# Patient Record
Sex: MALE | Race: WHITE | NOT HISPANIC OR LATINO | URBAN - METROPOLITAN AREA
[De-identification: names, ages, dates, MRNs, and addresses within clinical notes are randomized per-mention and may not be internally consistent; named-entity substitution may affect disease eponyms.]

---

## 2018-08-16 ENCOUNTER — INPATIENT (INPATIENT)
Facility: HOSPITAL | Age: 53
LOS: 3 days | Discharge: HOME | End: 2018-08-20
Attending: HOSPITALIST | Admitting: HOSPITALIST
Payer: MEDICAID

## 2018-08-16 VITALS
WEIGHT: 190.04 LBS | RESPIRATION RATE: 18 BRPM | DIASTOLIC BLOOD PRESSURE: 97 MMHG | TEMPERATURE: 98 F | SYSTOLIC BLOOD PRESSURE: 122 MMHG | OXYGEN SATURATION: 98 % | HEART RATE: 73 BPM | HEIGHT: 68 IN

## 2018-08-16 LAB
ALBUMIN SERPL ELPH-MCNC: 4.2 G/DL — SIGNIFICANT CHANGE UP (ref 3.3–5)
ALP SERPL-CCNC: 55 U/L — SIGNIFICANT CHANGE UP (ref 40–120)
ALT FLD-CCNC: 39 U/L — SIGNIFICANT CHANGE UP (ref 12–78)
ANION GAP SERPL CALC-SCNC: 11 MMOL/L — SIGNIFICANT CHANGE UP (ref 5–17)
APTT BLD: 27 SEC — LOW (ref 27.5–37.4)
AST SERPL-CCNC: 22 U/L — SIGNIFICANT CHANGE UP (ref 15–37)
BASOPHILS # BLD AUTO: 0.08 K/UL — SIGNIFICANT CHANGE UP (ref 0–0.2)
BASOPHILS NFR BLD AUTO: 0.7 % — SIGNIFICANT CHANGE UP (ref 0–2)
BILIRUB SERPL-MCNC: 0.3 MG/DL — SIGNIFICANT CHANGE UP (ref 0.2–1.2)
BUN SERPL-MCNC: 22 MG/DL — SIGNIFICANT CHANGE UP (ref 7–23)
CALCIUM SERPL-MCNC: 9.1 MG/DL — SIGNIFICANT CHANGE UP (ref 8.5–10.1)
CHLORIDE SERPL-SCNC: 103 MMOL/L — SIGNIFICANT CHANGE UP (ref 96–108)
CO2 SERPL-SCNC: 25 MMOL/L — SIGNIFICANT CHANGE UP (ref 22–31)
CREAT SERPL-MCNC: 1.16 MG/DL — SIGNIFICANT CHANGE UP (ref 0.5–1.3)
EOSINOPHIL # BLD AUTO: 0.07 K/UL — SIGNIFICANT CHANGE UP (ref 0–0.5)
EOSINOPHIL NFR BLD AUTO: 0.6 % — SIGNIFICANT CHANGE UP (ref 0–6)
GLUCOSE SERPL-MCNC: 100 MG/DL — HIGH (ref 70–99)
HCT VFR BLD CALC: 38.7 % — LOW (ref 39–50)
HCT VFR BLD CALC: 45.8 % — SIGNIFICANT CHANGE UP (ref 39–50)
HGB BLD-MCNC: 13.2 G/DL — SIGNIFICANT CHANGE UP (ref 13–17)
HGB BLD-MCNC: 15.7 G/DL — SIGNIFICANT CHANGE UP (ref 13–17)
IMM GRANULOCYTES NFR BLD AUTO: 0.6 % — SIGNIFICANT CHANGE UP (ref 0–1.5)
INR BLD: 1.13 RATIO — SIGNIFICANT CHANGE UP (ref 0.88–1.16)
LYMPHOCYTES # BLD AUTO: 1.99 K/UL — SIGNIFICANT CHANGE UP (ref 1–3.3)
LYMPHOCYTES # BLD AUTO: 18.4 % — SIGNIFICANT CHANGE UP (ref 13–44)
MCHC RBC-ENTMCNC: 31.5 PG — SIGNIFICANT CHANGE UP (ref 27–34)
MCHC RBC-ENTMCNC: 31.9 PG — SIGNIFICANT CHANGE UP (ref 27–34)
MCHC RBC-ENTMCNC: 34.1 GM/DL — SIGNIFICANT CHANGE UP (ref 32–36)
MCHC RBC-ENTMCNC: 34.3 GM/DL — SIGNIFICANT CHANGE UP (ref 32–36)
MCV RBC AUTO: 92 FL — SIGNIFICANT CHANGE UP (ref 80–100)
MCV RBC AUTO: 93.5 FL — SIGNIFICANT CHANGE UP (ref 80–100)
MONOCYTES # BLD AUTO: 0.78 K/UL — SIGNIFICANT CHANGE UP (ref 0–0.9)
MONOCYTES NFR BLD AUTO: 7.2 % — SIGNIFICANT CHANGE UP (ref 2–14)
NEUTROPHILS # BLD AUTO: 7.81 K/UL — HIGH (ref 1.8–7.4)
NEUTROPHILS NFR BLD AUTO: 72.5 % — SIGNIFICANT CHANGE UP (ref 43–77)
NRBC # BLD: 0 /100 WBCS — SIGNIFICANT CHANGE UP (ref 0–0)
NRBC # BLD: 0 /100 WBCS — SIGNIFICANT CHANGE UP (ref 0–0)
PLATELET # BLD AUTO: 253 K/UL — SIGNIFICANT CHANGE UP (ref 150–400)
PLATELET # BLD AUTO: 267 K/UL — SIGNIFICANT CHANGE UP (ref 150–400)
POTASSIUM SERPL-MCNC: 4.2 MMOL/L — SIGNIFICANT CHANGE UP (ref 3.5–5.3)
POTASSIUM SERPL-SCNC: 4.2 MMOL/L — SIGNIFICANT CHANGE UP (ref 3.5–5.3)
PROT SERPL-MCNC: 8.4 GM/DL — HIGH (ref 6–8.3)
PROTHROM AB SERPL-ACNC: 12.2 SEC — SIGNIFICANT CHANGE UP (ref 9.8–12.7)
RBC # BLD: 4.14 M/UL — LOW (ref 4.2–5.8)
RBC # BLD: 4.98 M/UL — SIGNIFICANT CHANGE UP (ref 4.2–5.8)
RBC # FLD: 13 % — SIGNIFICANT CHANGE UP (ref 10.3–14.5)
RBC # FLD: 13 % — SIGNIFICANT CHANGE UP (ref 10.3–14.5)
SODIUM SERPL-SCNC: 139 MMOL/L — SIGNIFICANT CHANGE UP (ref 135–145)
TROPONIN I SERPL-MCNC: 0.05 NG/ML — HIGH (ref 0.01–0.04)
TROPONIN I SERPL-MCNC: 0.35 NG/ML — HIGH (ref 0.01–0.04)
TROPONIN I SERPL-MCNC: 1.4 NG/ML — HIGH (ref 0.01–0.04)
WBC # BLD: 10.79 K/UL — HIGH (ref 3.8–10.5)
WBC # BLD: 9.65 K/UL — SIGNIFICANT CHANGE UP (ref 3.8–10.5)
WBC # FLD AUTO: 10.79 K/UL — HIGH (ref 3.8–10.5)
WBC # FLD AUTO: 9.65 K/UL — SIGNIFICANT CHANGE UP (ref 3.8–10.5)

## 2018-08-16 PROCEDURE — 93010 ELECTROCARDIOGRAM REPORT: CPT

## 2018-08-16 PROCEDURE — 74174 CTA ABD&PLVS W/CONTRAST: CPT | Mod: 26

## 2018-08-16 PROCEDURE — 71046 X-RAY EXAM CHEST 2 VIEWS: CPT | Mod: 26

## 2018-08-16 PROCEDURE — 93306 TTE W/DOPPLER COMPLETE: CPT | Mod: 26

## 2018-08-16 PROCEDURE — 99285 EMERGENCY DEPT VISIT HI MDM: CPT

## 2018-08-16 PROCEDURE — 71275 CT ANGIOGRAPHY CHEST: CPT | Mod: 26

## 2018-08-16 RX ORDER — METOPROLOL TARTRATE 50 MG
25 TABLET ORAL DAILY
Qty: 0 | Refills: 0 | Status: DISCONTINUED | OUTPATIENT
Start: 2018-08-16 | End: 2018-08-17

## 2018-08-16 RX ORDER — HYDROMORPHONE HYDROCHLORIDE 2 MG/ML
1 INJECTION INTRAMUSCULAR; INTRAVENOUS; SUBCUTANEOUS ONCE
Qty: 0 | Refills: 0 | Status: DISCONTINUED | OUTPATIENT
Start: 2018-08-16 | End: 2018-08-16

## 2018-08-16 RX ORDER — CLOPIDOGREL BISULFATE 75 MG/1
300 TABLET, FILM COATED ORAL ONCE
Qty: 0 | Refills: 0 | Status: COMPLETED | OUTPATIENT
Start: 2018-08-16 | End: 2018-08-16

## 2018-08-16 RX ORDER — PHENYLEPHRINE HYDROCHLORIDE 10 MG/ML
0.2 INJECTION INTRAVENOUS ONCE
Qty: 0 | Refills: 0 | Status: COMPLETED | OUTPATIENT
Start: 2018-08-16 | End: 2018-08-16

## 2018-08-16 RX ORDER — SODIUM CHLORIDE 9 MG/ML
4000 INJECTION INTRAMUSCULAR; INTRAVENOUS; SUBCUTANEOUS ONCE
Qty: 0 | Refills: 0 | Status: COMPLETED | OUTPATIENT
Start: 2018-08-16 | End: 2018-08-16

## 2018-08-16 RX ORDER — ONDANSETRON 8 MG/1
4 TABLET, FILM COATED ORAL EVERY 6 HOURS
Qty: 0 | Refills: 0 | Status: DISCONTINUED | OUTPATIENT
Start: 2018-08-16 | End: 2018-08-20

## 2018-08-16 RX ORDER — PHENYLEPHRINE HYDROCHLORIDE 10 MG/ML
0.2 INJECTION INTRAVENOUS
Qty: 40 | Refills: 0 | Status: DISCONTINUED | OUTPATIENT
Start: 2018-08-16 | End: 2018-08-18

## 2018-08-16 RX ORDER — CHOLECALCIFEROL (VITAMIN D3) 125 MCG
1000 CAPSULE ORAL DAILY
Qty: 0 | Refills: 0 | Status: DISCONTINUED | OUTPATIENT
Start: 2018-08-16 | End: 2018-08-20

## 2018-08-16 RX ORDER — ASPIRIN/CALCIUM CARB/MAGNESIUM 324 MG
162 TABLET ORAL DAILY
Qty: 0 | Refills: 0 | Status: DISCONTINUED | OUTPATIENT
Start: 2018-08-16 | End: 2018-08-16

## 2018-08-16 RX ORDER — HEPARIN SODIUM 5000 [USP'U]/ML
5000 INJECTION INTRAVENOUS; SUBCUTANEOUS ONCE
Qty: 0 | Refills: 0 | Status: COMPLETED | OUTPATIENT
Start: 2018-08-16 | End: 2018-08-16

## 2018-08-16 RX ORDER — SODIUM CHLORIDE 9 MG/ML
3 INJECTION INTRAMUSCULAR; INTRAVENOUS; SUBCUTANEOUS ONCE
Qty: 0 | Refills: 0 | Status: COMPLETED | OUTPATIENT
Start: 2018-08-16 | End: 2018-08-16

## 2018-08-16 RX ORDER — LEVOTHYROXINE SODIUM 125 MCG
88 TABLET ORAL DAILY
Qty: 0 | Refills: 0 | Status: DISCONTINUED | OUTPATIENT
Start: 2018-08-16 | End: 2018-08-20

## 2018-08-16 RX ORDER — CLOPIDOGREL BISULFATE 75 MG/1
75 TABLET, FILM COATED ORAL DAILY
Qty: 0 | Refills: 0 | Status: DISCONTINUED | OUTPATIENT
Start: 2018-08-17 | End: 2018-08-20

## 2018-08-16 RX ORDER — HEPARIN SODIUM 5000 [USP'U]/ML
5300 INJECTION INTRAVENOUS; SUBCUTANEOUS EVERY 6 HOURS
Qty: 0 | Refills: 0 | Status: DISCONTINUED | OUTPATIENT
Start: 2018-08-16 | End: 2018-08-17

## 2018-08-16 RX ORDER — ACETAMINOPHEN 500 MG
650 TABLET ORAL EVERY 6 HOURS
Qty: 0 | Refills: 0 | Status: DISCONTINUED | OUTPATIENT
Start: 2018-08-16 | End: 2018-08-20

## 2018-08-16 RX ORDER — MORPHINE SULFATE 50 MG/1
4 CAPSULE, EXTENDED RELEASE ORAL ONCE
Qty: 0 | Refills: 0 | Status: DISCONTINUED | OUTPATIENT
Start: 2018-08-16 | End: 2018-08-16

## 2018-08-16 RX ORDER — ESCITALOPRAM OXALATE 10 MG/1
10 TABLET, FILM COATED ORAL DAILY
Qty: 0 | Refills: 0 | Status: DISCONTINUED | OUTPATIENT
Start: 2018-08-16 | End: 2018-08-20

## 2018-08-16 RX ORDER — ASPIRIN/CALCIUM CARB/MAGNESIUM 324 MG
325 TABLET ORAL DAILY
Qty: 0 | Refills: 0 | Status: DISCONTINUED | OUTPATIENT
Start: 2018-08-16 | End: 2018-08-20

## 2018-08-16 RX ORDER — ATORVASTATIN CALCIUM 80 MG/1
80 TABLET, FILM COATED ORAL AT BEDTIME
Qty: 0 | Refills: 0 | Status: DISCONTINUED | OUTPATIENT
Start: 2018-08-16 | End: 2018-08-20

## 2018-08-16 RX ORDER — HEPARIN SODIUM 5000 [USP'U]/ML
INJECTION INTRAVENOUS; SUBCUTANEOUS
Qty: 25000 | Refills: 0 | Status: DISCONTINUED | OUTPATIENT
Start: 2018-08-16 | End: 2018-08-17

## 2018-08-16 RX ADMIN — SODIUM CHLORIDE 3 MILLILITER(S): 9 INJECTION INTRAMUSCULAR; INTRAVENOUS; SUBCUTANEOUS at 16:20

## 2018-08-16 RX ADMIN — HEPARIN SODIUM 1000 UNIT(S)/HR: 5000 INJECTION INTRAVENOUS; SUBCUTANEOUS at 21:17

## 2018-08-16 RX ADMIN — PHENYLEPHRINE HYDROCHLORIDE 0.2 MILLIGRAM(S): 10 INJECTION INTRAVENOUS at 22:27

## 2018-08-16 RX ADMIN — PHENYLEPHRINE HYDROCHLORIDE 6.74 MICROGRAM(S)/KG/MIN: 10 INJECTION INTRAVENOUS at 23:23

## 2018-08-16 RX ADMIN — CLOPIDOGREL BISULFATE 300 MILLIGRAM(S): 75 TABLET, FILM COATED ORAL at 21:17

## 2018-08-16 RX ADMIN — MORPHINE SULFATE 4 MILLIGRAM(S): 50 CAPSULE, EXTENDED RELEASE ORAL at 17:01

## 2018-08-16 RX ADMIN — SODIUM CHLORIDE 4000 MILLILITER(S): 9 INJECTION INTRAMUSCULAR; INTRAVENOUS; SUBCUTANEOUS at 22:00

## 2018-08-16 RX ADMIN — HEPARIN SODIUM 5000 UNIT(S): 5000 INJECTION INTRAVENOUS; SUBCUTANEOUS at 21:17

## 2018-08-16 RX ADMIN — MORPHINE SULFATE 4 MILLIGRAM(S): 50 CAPSULE, EXTENDED RELEASE ORAL at 17:18

## 2018-08-16 NOTE — ED PROVIDER NOTE - PROGRESS NOTE DETAILS
JW contacted Dr. Moseley reconsultation for Cath.  Per interventionalist no indication for cath consult at this time. JW On admission PT chest pain free NAD.  Several hours after admission PT grew hypotensive c/o CP. On admission mild trop elevation, EKG consistent with inferior NSTEMI. Pt heparinized and given ASA.  At this time given new findings contacted Dr. Moseley reconsultation for Cath.  Per interventionalist no indication for cath consult at this time.  Bedside echo reveals global hypokinesis, normal RV no signs of R heart strain, no findings consistent with thoracic aortic dissection, or AAA.  Lungs A line predominant.  No pericardial effusion.  Given presentation CTA ordered, intensivist Dr. Andersen at bedside.  Medicine will start pressers.  Repeat EKG in progress.  Medicine will reconsult interventionalist. JW My read of emergent CT no dissection, no massive pulmonary embolism.  No pericardial effusion.  This is an inferior wall MI.  Reconsultation of cath lab.  Pt receiving IVF and pressers under care of internal medicine.  Trop now 1.4.  Pt in route to cath lab. JW My read of emergent CT no dissection, no right heart strain, no massive pulmonary embolism.  No pericardial effusion.  BS US reveals diffuse global hypokinesis and decreased systolic function.  This is an inferior wall MI.  Reconsultation of cath lab.  Pt receiving IVF and pressers under care of internal medicine.  Trop now 1.4.  Pt in route to cath lab.

## 2018-08-16 NOTE — PROGRESS NOTE ADULT - ASSESSMENT
62yo M suffering from acute systolic CHF due to acute cardiac ischemia and NSTEMI  Cardiogenic shock requiring vasopressors  high risk for deterioration.    Plan at this time for urgent coronary angiogram and intervention as appropriate  IVF  pressors  ASA  Plavix  supportive care    will f/u post-procedure.

## 2018-08-16 NOTE — CHART NOTE - NSCHARTNOTEFT_GEN_A_CORE
Pt was found to have sudden hypotensive sbp ranging 50s-90s. Substernal chest pain radiation to left jaw, diaphoresis, agitation, pale.   Stat ekg of right side showed ST depression of II, III, aVF  S/p 2L NS bolus SBP remain 80-90s  Case discussed with Dr. Moseley  Will repeat stat EKG, CBC, trop   Start Alexanrd at 250mcg bolus then drip   Pt is going for cath tonight. Pt was found to have sudden hypotensive sbp ranging 50s-90s. Substernal chest pain radiation to left jaw, diaphoresis, agitation, pale.   Stat ekg of right side showed ST depression of II, III, aVF  S/p 2L NS bolus SBP remain 80-90s  Case discussed with Dr. Moseley  Will repeat stat EKG, CBC, trop   Start Alexandr at 250mcg bolus then drip   Stat CTA chest abd/pelvis to r/o dissection   Continue IV bolus   Pt is going for cath tonight.

## 2018-08-16 NOTE — ED PROVIDER NOTE - OBJECTIVE STATEMENT
52 y/o M with a PMHx of Hypothyroidism, on Synthroid presenting to the ED via EMS c/o CP. Pt reports that he has been exercising more recently and over the last 3-4 weeks, he has experienced intermittent exertional CP radiating to his jaw when he goes for a walk. Today, he felt a non-radiating, non-exertional, sub-sternal CP that presented 2 hours ago while sitting down. EMS gave 1 NTG and four 81mg ASA. Pain was 7/10 in severity before medicine, now 2/10. No fevers, chills, abd pain, N/V, weakness, or numbness. NKDA.

## 2018-08-16 NOTE — ED PROVIDER NOTE - NS_ ATTENDINGSCRIBEDETAILS _ED_A_ED_FT
The scribe's documentation has been prepared under my direction and personally reviewed by me in its entirety.  I confirm that the note above accurately reflects all my work, treatment, procedures, and decision making except where otherwise noted or amended by me.  Laurent Paniagua M.D.

## 2018-08-16 NOTE — CONSULT NOTE ADULT - ATTENDING COMMENTS
Case discussed with the team and all records reviewed. Greater than 50% of the visit was for coordination of care.; Total face to face time:  75   minutes. Time is exclusive of billed procedures for the same day of service

## 2018-08-16 NOTE — ED PROVIDER NOTE - ST/T WAVE
T-wave inversion and ST depression in interior leads. T-wave inversion and ST depression in inferior leads.

## 2018-08-16 NOTE — H&P ADULT - HISTORY OF PRESENT ILLNESS
Pt is a 52yo M w PMH of HTN, HLD, hypothyroidsm, depression present to ED with resting chest pain. Pt states he has been having resting chest pain on and off for past 3-4 weeks. It is pressure like, mid-sternal 3/10, relieved by rest occures when walking and occasional radiation to left jaw. Today, pt was siting and noticed 4/10 mid chest pain pressure like associated with diaphoresis EMS give 4 Aspirine 81mg, 1 NTG, pain was slowly resolving. Pt denies of any similar symptoms. Denies of any SOB, palpitation, dizziness, fever, chills. Pt is concerned that he gained 15 lb for past 7 months. Denies of any suicidal ideation. No recent travel or sick contact. Pt was never a smoker. Father die of MI at age 60. Mother has stent placement at age 70.     At ED  Trop 0.054  s/p morphine relieved pain   EKG showed ST depression at Lead II, III, aVF    Case discussed with Dr. Gonzalez, will start Heparin drip, loading with Plavix

## 2018-08-16 NOTE — ED ADULT NURSE REASSESSMENT NOTE - NS ED NURSE REASSESS COMMENT FT1
Dr. POSEY made aware of BP 91/66, pt asymptomatic. Per Dr. Posey pt to be transferred to tele unit for admission.

## 2018-08-16 NOTE — ED ADULT NURSE NOTE - NSIMPLEMENTINTERV_GEN_ALL_ED
Implemented All Universal Safety Interventions:  [place] to call system. Call bell, personal items and telephone within reach. Instruct patient to call for assistance. Room bathroom lighting operational. Non-slip footwear when patient is off stretcher. Physically safe environment: no spills, clutter or unnecessary equipment. Stretcher in lowest position, wheels locked, appropriate side rails in place.

## 2018-08-16 NOTE — H&P ADULT - ASSESSMENT
Pt is a 54yo M w PMH of HTN, HLD, hypothyroidsm, depression present to ED with resting chest pain. Pt states he has been having resting chest pain on and off for past 3-4 weeks. It is pressure like, mid-sternal 3/10, relieved by rest occures when walking and occasional radiation to left jaw. Today, pt was siting and noticed 4/10 mid chest pain pressure like associated with diaphoresis EMS give 4 Aspirin 81mg, 1 NTG, pain was slowly resolving. Pt denies of any similar symptoms. Denies of any SOB, palpitation, dizziness, fever, chills. Pt is concerned that he gained 15 lb for past 7 months. Denies of any suicidal ideation. No recent travel or sick contact. Pt was never a smoker. Father die of MI at age 60. Mother has stent placement at age 70.     *NSTEMI  Admit to tele  EKG showed ST depression inferior lead II, III, aVF  S/p 4 tablets of ASA 81mg via EMS  Start heparin drip   Plavix bolus  Increase lipitor to 80mg   Cardiology consult     * HTN  Controlled  Continue current meds     *HLD  Increase to high dose statin     hypothyroidism  F/u TSH  Continue current synthroid     *Depression  Continue Lexapro Pt is a 52yo M w PMH of HTN, HLD, hypothyroidsm, depression present to ED with resting chest pain. Pt states he has been having resting chest pain on and off for past 3-4 weeks. It is pressure like, mid-sternal 3/10, relieved by rest occures when walking and occasional radiation to left jaw. Today, pt was siting and noticed 4/10 mid chest pain pressure like associated with diaphoresis EMS give 4 Aspirin 81mg, 1 NTG, pain was slowly resolving. Pt denies of any similar symptoms. Denies of any SOB, palpitation, dizziness, fever, chills. Pt is concerned that he gained 15 lb for past 7 months. Denies of any suicidal ideation. No recent travel or sick contact. Pt was never a smoker. Father die of MI at age 60. Mother has stent placement at age 70.     *NSTEMI  Admit to tele  EKG showed ST depression inferior lead II, III, aVF  S/p 4 tablets of ASA 81mg via EMS  Start heparin drip   Plavix bolus  Continue ASA, plavix   Increase Lipitor to 80mg   Cardiology consult     * HTN  Controlled  Continue current meds     *HLD  Increase to high dose statin     hypothyroidism  F/u TSH  Continue current synthroid     *Depression  Continue Lexapro     *DVT ppx   IMPROVE VTE Score ___1______  pt is currently on heparin drip for NSTEMI

## 2018-08-16 NOTE — ED PROVIDER NOTE - CARE PLAN
Principal Discharge DX:	Syncope Principal Discharge DX:	NSTEMI (non-ST elevated myocardial infarction)

## 2018-08-16 NOTE — CONSULT NOTE ADULT - ASSESSMENT
NSTEMI  Hypotension. NSTEMI  Hypotension.  Mod to severe MR  Moderate TR. Moderate PAH  HLD  family history of early CAD    Suggest:    Cardiac monitor  O2 supplement  Follow up Cardiac enzymes  Treat with aspirin, Plavix  IV Heparin  Hold beta blockers till BP stabilizes.  Statins  Hold nitrates  Echocardiogram shows basal inferolateral hypokinesis. Ischemia evaluation - Based on pt's symptoms, history, risk factors, exam, lab and EKG data I have advised pt have a cardiac catheterization and possible percutaneous coronary intervention. Procedure, its risks, alternatives, benefits and potential complications were discussed in detail. Risks include but not limited to bleeding, infection, allergy, renal failure requiring dialysis, stroke, vascular injury, pericardial tamponade, arrhythmias, MI and even death. Pt is agreeable and has consented for the procedure and the procedure is being scheduled.  Admit to CCU. Further treatment orders after cardiac cath, meanwhile continue current treatment with aspirin and plavix. Keep pain free with Morphine as needed and tolerated. Keep optimal BP and HR.

## 2018-08-16 NOTE — PROGRESS NOTE ADULT - SUBJECTIVE AND OBJECTIVE BOX
PT seen in ED with Dr TOMASZ Posey (hospitalist) and Dr Tesfaye (ED) and Dr Moseley (cardio).  I became involved as pt hypotensive requiring administration of IV vasopressors.  Seen and examined.   D/w Pt in detail and and all questions answered -   Pt suggests that his brother Aaron Mg is [facility] - [phone] - I spoke with pts brother pre-procedure and discussed issues and plans at pts request.    Per records and discussion with pt, he is a 52yo M - PMH of HTN, HLD, hypothyroidism depression c/o intermittent CP at rest for 3-4 weeks  pressure like, mid-sternal 3/10, relieved by rest   also occurs with walking   occasional radiation to left jaw.   Day of admission, pt with 4/10 mid chest pressure at rest, (+) associated diaphoresis   EMS gave 4 x ASA 81mg, 1 NTG, pain was slowly resolving.     In ED pt with elevated troponin (0.054) and EKG demonstrating ST depression in II, III, aVF  Initially case d/w Dr Gonzalez by Dr TOMASZ Posey and plan was for heparin drip, and loading with Plavix    Unfortunately pt continued to deteriorate - heparin infusion d/c'd due to concern for hemorrhage.  STAT CTA chest with NO evidence of PE or dissection.  STAT ECHO - basal hypokinesis. Moderate to severe MR, moderate TR, moderate PAH.     Given above and pt requirement for pressors to maintain MAP 65mmHg, pt taken to cath lab urgently.      PAST MEDICAL & SURGICAL HISTORY:  HLD (hyperlipidemia)  HTN (hypertension)  Depression  No significant past surgical history        Allergies    No Known Allergies    Height (cm): 172.72 (08-16 @ 16:04)  Weight (kg): 89.9 (08-16 @ 18:32)  BMI (kg/m2): 30.1 (08-16 @ 18:32)    ICU Vital Signs Last 24 Hrs  T(C): 36.6 (16 Aug 2018 21:15), Max: 36.6 (16 Aug 2018 16:04)  T(F): 97.9 (16 Aug 2018 21:15), Max: 97.9 (16 Aug 2018 16:04)  HR: 67 (16 Aug 2018 22:35) (52 - 73)  BP: 108/67 (16 Aug 2018 22:35) (61/46 - 131/93)  BP(mean): --  ABP: --  ABP(mean): --  RR: 16 (16 Aug 2018 21:15) (15 - 23)  SpO2: 96% (16 Aug 2018 21:30) (92% - 100%)          I&O's Summary                            13.2   9.65  )-----------( 253      ( 16 Aug 2018 22:01 )             38.7       08-16    139  |  103  |  22  ----------------------------<  100<H>  4.2   |  25  |  1.16    Ca    9.1      16 Aug 2018 16:15    TPro  8.4<H>  /  Alb  4.2  /  TBili  0.3  /  DBili  x   /  AST  22  /  ALT  39  /  AlkPhos  55  08-16      CAPILLARY BLOOD GLUCOSE          LIVER FUNCTIONS - ( 16 Aug 2018 16:15 )  Alb: 4.2 g/dL / Pro: 8.4 gm/dL / ALK PHOS: 55 U/L / ALT: 39 U/L / AST: 22 U/L / GGT: x             CARDIAC MARKERS ( 16 Aug 2018 22:01 )  1.400 ng/mL / x     / x     / x     / x      CARDIAC MARKERS ( 16 Aug 2018 19:54 )  0.355 ng/mL / x     / x     / x     / x      CARDIAC MARKERS ( 16 Aug 2018 16:15 )  0.054 ng/mL / x     / x     / x     / x            PT/INR - ( 16 Aug 2018 16:15 )   PT: 12.2 sec;   INR: 1.13 ratio         PTT - ( 16 Aug 2018 16:15 )  PTT:27.0 sec            MEDICATIONS  (STANDING):  aspirin 325 milliGRAM(s) Oral daily  atorvastatin 80 milliGRAM(s) Oral at bedtime  cholecalciferol 1000 Unit(s) Oral daily  clopidogrel Tablet 75 milliGRAM(s) Oral daily  enalapril 10 milliGRAM(s) Oral daily  escitalopram 10 milliGRAM(s) Oral daily  levothyroxine 88 MICROGram(s) Oral daily  metoprolol tartrate 25 milliGRAM(s) Oral daily  phenylephrine    Infusion 0.2 MICROgram(s)/kG/Min (6.742 mL/Hr) IV Continuous <Continuous>    MEDICATIONS  (PRN):  acetaminophen   Tablet 650 milliGRAM(s) Oral every 6 hours PRN For Temp greater than 38 C (100.4 F)  ondansetron Injectable 4 milliGRAM(s) IV Push every 6 hours PRN Nausea          Advanced Directives: FULL  Discussed with: patient    Visit Information: Critical care time 45 min.    ** Time is exclusive of billed procedures and/or teaching and/or routine family updates.

## 2018-08-16 NOTE — CONSULT NOTE ADULT - SUBJECTIVE AND OBJECTIVE BOX
Chief complaint of Chest pain (16 Aug 2018 19:51)      HPI:  52 yo male admitted for ACS. Developed dizziness, hypotension, diaphoresis. EKG showing ST depressions inferior leads. No ST elevations noted. No Suggestion of RV infarct on right sided EKG leads. Echo shows basal hypokinesis. Moderate to severe MR, moderate TR, moderate PAH. BP better with IV neosynepherine. PMH of HTN, HLD, hypothyroidsm, depression. Family history positive for early onset CAD     PAST MEDICAL & SURGICAL HISTORY:  HLD (hyperlipidemia)  HTN (hypertension)  Depression  No significant past surgical history   Cardiac cath 6 years ago negative per pt.      ALLERGIES:    No Known Allergies       MEDICATIONS  (STANDING):  aspirin 325 milliGRAM(s) Oral daily  atorvastatin 80 milliGRAM(s) Oral at bedtime  cholecalciferol 1000 Unit(s) Oral daily  enalapril 10 milliGRAM(s) Oral daily  escitalopram 10 milliGRAM(s) Oral daily  heparin  Infusion.  Unit(s)/Hr (10 mL/Hr) IV Continuous <Continuous>  levothyroxine 88 MICROGram(s) Oral daily  metoprolol tartrate 25 milliGRAM(s) Oral daily  phenylephrine    Infusion 0.2 MICROgram(s)/kG/Min (6.742 mL/Hr) IV Continuous <Continuous>  phenylephrine   1 mG/10 mL NaCl 0.9% Injectable 0.2 milliGRAM(s) IV Push once    MEDICATIONS  (PRN):  acetaminophen   Tablet 650 milliGRAM(s) Oral every 6 hours PRN For Temp greater than 38 C (100.4 F)  heparin  Injectable 5300 Unit(s) IV Push every 6 hours PRN For aPTT less than 40  ondansetron Injectable 4 milliGRAM(s) IV Push every 6 hours PRN Nausea      FAMILY HISTORY:  Family history of heart attack (Father)        SOCIAL HISTORY:  Nonsmoker. No ETOH abuse. No illicit drugs.     ROS:     Detailed ten system ROS was performed and was negative except for history as eluded to above.    no fever  no chills  no nausea  no vomiting  no diarrhea  no constipation  no melena  no hematochezia  + chest pain  no palpitations  no sob at rest  no dyspnea on exertion  no cough  no wheezing  no anorexia  no headache  + dizziness  no syncope  no weakness  no myalgia  no dysuria  no polyuria  no hematuria     Vital Signs Last 24 Hrs  T(C): 36.6 (16 Aug 2018 21:15), Max: 36.6 (16 Aug 2018 16:04)  T(F): 97.9 (16 Aug 2018 21:15), Max: 97.9 (16 Aug 2018 16:04)  HR: 67 (16 Aug 2018 22:35) (52 - 73)  BP: 108/67 (16 Aug 2018 22:35) (61/46 - 131/93)  BP(mean): --  RR: 16 (16 Aug 2018 21:15) (15 - 23)  SpO2: 96% (16 Aug 2018 21:30) (92% - 100%)    I&O's Summary      PHYSICAL EXAM:    General:                Comfortable, AAO X 3, in no distress. Diaphoretic  HEENT:                  Atraumatic, PERRLA, EOMI, conjunctiva clear.   Neck:                     Supple, no adenopathy, no thyromegaly, no JVD, no bruit.  Back:                     Symmetric, non tender.  Chest:                    Clear, B/L symmetric air entry, no tachypnea  Heart:                     S1, S2 normal, no gallop, + murmur.  Abdomen:              Soft, non-tender, bowel sounds active. No palpable masses.  Extremities:           no cyanosis, no edema. Peripheral pulses normal.  Skin:                      Skin color, texture normal. No rashes.  Neurologic:            Grossly nonfocal.       TELEMETRY:  Normal sinus rhythm with no tachy or rory events     ECG:  Sinus, no ST elevations. ST depression and T wave inversion noted in the inferior leads    LABS:                          13.2   9.65  )-----------( 253      ( 16 Aug 2018 22:01 )             38.7     08-16    139  |  103  |  22  ----------------------------<  100<H>  4.2   |  25  |  1.16    Ca    9.1      16 Aug 2018 16:15    TPro  8.4<H>  /  Alb  4.2  /  TBili  0.3  /  DBili  x   /  AST  22  /  ALT  39  /  AlkPhos  55  08-16 08-16 @ 22:01  Trop-I  1.400    08-16 @ 19:54  Trop-I  0.355    08-16 @ 16:15  Trop-I  0.054      PT/INR - ( 16 Aug 2018 16:15 )   PT: 12.2 sec;   INR: 1.13 ratio    PTT - ( 16 Aug 2018 16:15 )  PTT:27.0 sec    RADIOLOGY & ADDITIONAL STUDIES:    CT chest: No Dissection, no PE Chief complaint of Chest pain (16 Aug 2018 19:51)      HPI:  54 yo male admitted for ACS. ER triage at 1604. Seen by hospitalist and admitted to cardiac bed. At 2130 PM developed dizziness, hypotension, diaphoresis. EKG showing ST depressions in the inferior leads. No ST elevations noted. No suggestion of RV infarct on right sided EKG leads. Echo shows basal hypokinesis. Moderate to severe MR, moderate TR, moderate PAH. BP better with IV neosynepherine. PMH of HTN, HLD, hypothyroidsm, depression. Family history positive for early onset CAD     PAST MEDICAL & SURGICAL HISTORY:  HLD (hyperlipidemia)  HTN (hypertension)  Hypothyroidism  Depression  Sleep Apnea  No significant past surgical history   Cardiac cath 6 years ago negative per pt.      ALLERGIES:    No Known Allergies       MEDICATIONS  (STANDING):  aspirin 325 milliGRAM(s) Oral daily  atorvastatin 80 milliGRAM(s) Oral at bedtime  cholecalciferol 1000 Unit(s) Oral daily  enalapril 10 milliGRAM(s) Oral daily  escitalopram 10 milliGRAM(s) Oral daily  heparin  Infusion.  Unit(s)/Hr (10 mL/Hr) IV Continuous <Continuous>  levothyroxine 88 MICROGram(s) Oral daily  metoprolol tartrate 25 milliGRAM(s) Oral daily  phenylephrine    Infusion 0.2 MICROgram(s)/kG/Min (6.742 mL/Hr) IV Continuous <Continuous>  phenylephrine   1 mG/10 mL NaCl 0.9% Injectable 0.2 milliGRAM(s) IV Push once    MEDICATIONS  (PRN):  acetaminophen   Tablet 650 milliGRAM(s) Oral every 6 hours PRN For Temp greater than 38 C (100.4 F)  heparin  Injectable 5300 Unit(s) IV Push every 6 hours PRN For aPTT less than 40  ondansetron Injectable 4 milliGRAM(s) IV Push every 6 hours PRN Nausea      FAMILY HISTORY:  Family history of heart attack (Father)        SOCIAL HISTORY:  Nonsmoker. No ETOH abuse. No illicit drugs.     ROS:     Detailed ten system ROS was performed and was negative except for history as eluded to above.    no fever  no chills  no nausea  no vomiting  no diarrhea  no constipation  no melena  no hematochezia  + chest pain  no palpitations  no sob at rest  no dyspnea on exertion  no cough  no wheezing  no anorexia  no headache  + dizziness  no syncope  no weakness  no myalgia  no dysuria  no polyuria  no hematuria     Vital Signs Last 24 Hrs  T(C): 36.6 (16 Aug 2018 21:15), Max: 36.6 (16 Aug 2018 16:04)  T(F): 97.9 (16 Aug 2018 21:15), Max: 97.9 (16 Aug 2018 16:04)  HR: 67 (16 Aug 2018 22:35) (52 - 73)  BP: 108/67 (16 Aug 2018 22:35) (61/46 - 131/93)  BP(mean): --  RR: 16 (16 Aug 2018 21:15) (15 - 23)  SpO2: 96% (16 Aug 2018 21:30) (92% - 100%)    I&O's Summary      PHYSICAL EXAM:    General:                Comfortable, AAO X 3, in no distress. Diaphoretic  HEENT:                  Atraumatic, PERRLA, EOMI, conjunctiva clear.   Neck:                     Supple, no adenopathy, no thyromegaly, no JVD, no bruit.  Back:                     Symmetric, non tender.  Chest:                    Clear, B/L symmetric air entry, no tachypnea  Heart:                     S1, S2 normal, no gallop, + murmur.  Abdomen:              Soft, non-tender, bowel sounds active. No palpable masses.  Extremities:           no cyanosis, no edema. Peripheral pulses normal.  Skin:                      Skin color, texture normal. No rashes.  Neurologic:            Grossly nonfocal.       TELEMETRY:  Normal sinus rhythm with no tachy or rory events     ECG:  Sinus, no ST elevations. ST depression and T wave inversion noted in the inferior leads    LABS:                          13.2   9.65  )-----------( 253      ( 16 Aug 2018 22:01 )             38.7     08-16    139  |  103  |  22  ----------------------------<  100<H>  4.2   |  25  |  1.16    Ca    9.1      16 Aug 2018 16:15    TPro  8.4<H>  /  Alb  4.2  /  TBili  0.3  /  DBili  x   /  AST  22  /  ALT  39  /  AlkPhos  55  08-16        08-16 @ 22:01  Trop-I  1.400    08-16 @ 19:54  Trop-I  0.355    08-16 @ 16:15  Trop-I  0.054      PT/INR - ( 16 Aug 2018 16:15 )   PT: 12.2 sec;   INR: 1.13 ratio    PTT - ( 16 Aug 2018 16:15 )  PTT:27.0 sec    RADIOLOGY & ADDITIONAL STUDIES:    CT chest: No Dissection, no PE

## 2018-08-16 NOTE — ED PROVIDER NOTE - CARDIAC, MLM
Normal rate, regular rhythm.  Heart sounds S1, S2.  No murmurs, rubs or gallops. Equal pulses bilaterally.

## 2018-08-16 NOTE — ED PROVIDER NOTE - MEDICAL DECISION MAKING DETAILS
54 y/o M with a PMHx of Hypothyroidism, on Synthroid presenting to the ED via EMS c/o CP x2 hours. Plan: EKG, cardiac work-up, blood-work, CXR. Likely admit. 54 y/o M with a PMHx of Hypothyroidism, on Synthroid presenting to the ED via EMS c/o CP x2 hours.  VSS WNL.  DDX: ACS, angina, PTX, PNA, dissection, pericarditis, myocarditis, PE.  Obtain intravenous access, initiate continuous cardiac monitoring for arrhythmia/ischemia, continuous pulse oximetry monitoring, provide supplementary O2 if required maintaining Hg saturation above 96%. CMP to screen for electrolyte abnormality, assess renal function, liver function, acid base status.  PT/PTT to obtain baseline coagulation profile in preparation for urgent anticoagulation if requied and to assess potential bleeding risk.  CBC to screen for anemia, platelet count, signs of infection, and signs of malignancy.  EKG with prior comparison to screen for UA/NSTEMI, STEMI, PE, conduction abnormality, and arrhythmia.  Serial EKGs with worsening chest pain, positive and repeat cardiac enzymes to assess for dynamic EKG changes and ischemia.  Serial Cardiac Enzymes and risk stratification according to the HEART Risk Stratification System.  Chest radiograph, carotid pulses, pulses in both arms, to screen for dissection, pneumothorax, pneumonia, effusion, Boerhave Syndrome, perforation, and atypical causes of chest pain.  Assess for PE according to [place]/PERC criteria.  Treat pain with nitroglycerine, morphine, NSAIDS as required.  PT took ASA prior to admission.  Fluid resuscitation as required.  Reassess.

## 2018-08-16 NOTE — H&P ADULT - NSHPPHYSICALEXAM_GEN_ALL_CORE
Vital Signs Last 24 Hrs  T(C): 36.6 (16 Aug 2018 16:04), Max: 36.6 (16 Aug 2018 16:04)  T(F): 97.9 (16 Aug 2018 16:04), Max: 97.9 (16 Aug 2018 16:04)  HR: 73 (16 Aug 2018 16:04) (73 - 73)  BP: 122/97 (16 Aug 2018 16:04) (122/97 - 122/97)  BP(mean): --  RR: 18 (16 Aug 2018 16:04) (18 - 18)  SpO2: 98% (16 Aug 2018 16:04) (98% - 98%)

## 2018-08-16 NOTE — ED ADULT NURSE REASSESSMENT NOTE - NS ED NURSE REASSESS COMMENT FT1
pt denies relief from morphine, Dr. Landry made aware. Pt offered dilaudid for pain, pt declines at this time.

## 2018-08-16 NOTE — ED ADULT NURSE NOTE - OBJECTIVE STATEMENT
pt was sitting reading when he got sudden onset of CP, pt received 1 nitro sublingual by EMS and symptoms relieved PTA, 20G LFA by EMS. HX HTN, HDL, anxiety

## 2018-08-17 LAB
ALBUMIN SERPL ELPH-MCNC: 3.6 G/DL — SIGNIFICANT CHANGE UP (ref 3.3–5)
ALP SERPL-CCNC: 54 U/L — SIGNIFICANT CHANGE UP (ref 40–120)
ALT FLD-CCNC: 80 U/L — HIGH (ref 12–78)
ANION GAP SERPL CALC-SCNC: 7 MMOL/L — SIGNIFICANT CHANGE UP (ref 5–17)
APTT BLD: 42.7 SEC — HIGH (ref 27.5–37.4)
AST SERPL-CCNC: 483 U/L — HIGH (ref 15–37)
BASOPHILS # BLD AUTO: 0.04 K/UL — SIGNIFICANT CHANGE UP (ref 0–0.2)
BASOPHILS NFR BLD AUTO: 0.3 % — SIGNIFICANT CHANGE UP (ref 0–2)
BILIRUB SERPL-MCNC: 0.5 MG/DL — SIGNIFICANT CHANGE UP (ref 0.2–1.2)
BUN SERPL-MCNC: 18 MG/DL — SIGNIFICANT CHANGE UP (ref 7–23)
CALCIUM SERPL-MCNC: 8.1 MG/DL — LOW (ref 8.5–10.1)
CHLORIDE SERPL-SCNC: 107 MMOL/L — SIGNIFICANT CHANGE UP (ref 96–108)
CHOLEST SERPL-MCNC: 140 MG/DL — SIGNIFICANT CHANGE UP (ref 10–199)
CK SERPL-CCNC: 4605 U/L — HIGH (ref 26–308)
CO2 SERPL-SCNC: 24 MMOL/L — SIGNIFICANT CHANGE UP (ref 22–31)
CREAT SERPL-MCNC: 1.12 MG/DL — SIGNIFICANT CHANGE UP (ref 0.5–1.3)
EOSINOPHIL # BLD AUTO: 0 K/UL — SIGNIFICANT CHANGE UP (ref 0–0.5)
EOSINOPHIL NFR BLD AUTO: 0 % — SIGNIFICANT CHANGE UP (ref 0–6)
GLUCOSE SERPL-MCNC: 124 MG/DL — HIGH (ref 70–99)
HBA1C BLD-MCNC: 5.5 % — SIGNIFICANT CHANGE UP (ref 4–5.6)
HCT VFR BLD CALC: 44.9 % — SIGNIFICANT CHANGE UP (ref 39–50)
HDLC SERPL-MCNC: 28 MG/DL — LOW
HGB BLD-MCNC: 15.8 G/DL — SIGNIFICANT CHANGE UP (ref 13–17)
IMM GRANULOCYTES NFR BLD AUTO: 0.6 % — SIGNIFICANT CHANGE UP (ref 0–1.5)
LIPID PNL WITH DIRECT LDL SERPL: 89 MG/DL — SIGNIFICANT CHANGE UP
LYMPHOCYTES # BLD AUTO: 1.36 K/UL — SIGNIFICANT CHANGE UP (ref 1–3.3)
LYMPHOCYTES # BLD AUTO: 10.6 % — LOW (ref 13–44)
MAGNESIUM SERPL-MCNC: 2.1 MG/DL — SIGNIFICANT CHANGE UP (ref 1.6–2.6)
MCHC RBC-ENTMCNC: 32 PG — SIGNIFICANT CHANGE UP (ref 27–34)
MCHC RBC-ENTMCNC: 35.2 GM/DL — SIGNIFICANT CHANGE UP (ref 32–36)
MCV RBC AUTO: 90.9 FL — SIGNIFICANT CHANGE UP (ref 80–100)
MONOCYTES # BLD AUTO: 1.04 K/UL — HIGH (ref 0–0.9)
MONOCYTES NFR BLD AUTO: 8.1 % — SIGNIFICANT CHANGE UP (ref 2–14)
NEUTROPHILS # BLD AUTO: 10.26 K/UL — HIGH (ref 1.8–7.4)
NEUTROPHILS NFR BLD AUTO: 80.4 % — HIGH (ref 43–77)
PHOSPHATE SERPL-MCNC: 3.6 MG/DL — SIGNIFICANT CHANGE UP (ref 2.5–4.5)
PLATELET # BLD AUTO: 260 K/UL — SIGNIFICANT CHANGE UP (ref 150–400)
POTASSIUM SERPL-MCNC: 4.4 MMOL/L — SIGNIFICANT CHANGE UP (ref 3.5–5.3)
POTASSIUM SERPL-SCNC: 4.4 MMOL/L — SIGNIFICANT CHANGE UP (ref 3.5–5.3)
PROT SERPL-MCNC: 7.3 GM/DL — SIGNIFICANT CHANGE UP (ref 6–8.3)
RBC # BLD: 4.94 M/UL — SIGNIFICANT CHANGE UP (ref 4.2–5.8)
RBC # FLD: 13.2 % — SIGNIFICANT CHANGE UP (ref 10.3–14.5)
SODIUM SERPL-SCNC: 138 MMOL/L — SIGNIFICANT CHANGE UP (ref 135–145)
TOTAL CHOLESTEROL/HDL RATIO MEASUREMENT: 5 RATIO — SIGNIFICANT CHANGE UP (ref 3.4–9.6)
TRIGL SERPL-MCNC: 115 MG/DL — SIGNIFICANT CHANGE UP (ref 10–149)
TROPONIN I SERPL-MCNC: 129 NG/ML — HIGH (ref 0.01–0.04)
TROPONIN I SERPL-MCNC: 97 NG/ML — HIGH (ref 0.01–0.04)
TSH SERPL-MCNC: 3.98 UU/ML — SIGNIFICANT CHANGE UP (ref 0.34–4.82)
WBC # BLD: 12.78 K/UL — HIGH (ref 3.8–10.5)
WBC # FLD AUTO: 12.78 K/UL — HIGH (ref 3.8–10.5)

## 2018-08-17 RX ORDER — ESCITALOPRAM OXALATE 10 MG/1
1 TABLET, FILM COATED ORAL
Qty: 0 | Refills: 0 | COMMUNITY

## 2018-08-17 RX ORDER — METOPROLOL TARTRATE 50 MG
1 TABLET ORAL
Qty: 0 | Refills: 0 | COMMUNITY

## 2018-08-17 RX ORDER — HEPARIN SODIUM 5000 [USP'U]/ML
5000 INJECTION INTRAVENOUS; SUBCUTANEOUS EVERY 8 HOURS
Qty: 0 | Refills: 0 | Status: DISCONTINUED | OUTPATIENT
Start: 2018-08-17 | End: 2018-08-20

## 2018-08-17 RX ORDER — METOPROLOL TARTRATE 50 MG
12.5 TABLET ORAL
Qty: 0 | Refills: 0 | Status: DISCONTINUED | OUTPATIENT
Start: 2018-08-17 | End: 2018-08-18

## 2018-08-17 RX ORDER — CHOLECALCIFEROL (VITAMIN D3) 125 MCG
1 CAPSULE ORAL
Qty: 0 | Refills: 0 | COMMUNITY

## 2018-08-17 RX ORDER — LEVOTHYROXINE SODIUM 125 MCG
1 TABLET ORAL
Qty: 0 | Refills: 0 | COMMUNITY

## 2018-08-17 RX ADMIN — Medication 88 MICROGRAM(S): at 06:07

## 2018-08-17 RX ADMIN — Medication 1000 UNIT(S): at 13:04

## 2018-08-17 RX ADMIN — Medication 325 MILLIGRAM(S): at 13:04

## 2018-08-17 RX ADMIN — ATORVASTATIN CALCIUM 80 MILLIGRAM(S): 80 TABLET, FILM COATED ORAL at 22:04

## 2018-08-17 RX ADMIN — Medication 25 MILLIGRAM(S): at 06:07

## 2018-08-17 RX ADMIN — ATORVASTATIN CALCIUM 80 MILLIGRAM(S): 80 TABLET, FILM COATED ORAL at 01:53

## 2018-08-17 RX ADMIN — HEPARIN SODIUM 5000 UNIT(S): 5000 INJECTION INTRAVENOUS; SUBCUTANEOUS at 22:04

## 2018-08-17 RX ADMIN — ESCITALOPRAM OXALATE 10 MILLIGRAM(S): 10 TABLET, FILM COATED ORAL at 18:07

## 2018-08-17 RX ADMIN — HEPARIN SODIUM 5000 UNIT(S): 5000 INJECTION INTRAVENOUS; SUBCUTANEOUS at 06:06

## 2018-08-17 RX ADMIN — HEPARIN SODIUM 5000 UNIT(S): 5000 INJECTION INTRAVENOUS; SUBCUTANEOUS at 13:03

## 2018-08-17 RX ADMIN — CLOPIDOGREL BISULFATE 75 MILLIGRAM(S): 75 TABLET, FILM COATED ORAL at 13:04

## 2018-08-17 NOTE — PROGRESS NOTE ADULT - SUBJECTIVE AND OBJECTIVE BOX
HPI:    53 year old male with hx. HTN, HLD, hypothyroidism, presented with chest pain, with st depressions, positive troponis, echo showed basal akinesis, MR,     had cath and received LIONEL to RCA, and circumflex, had diffuse LAD disease not requiring intubation. Post cath patient had IABP placed.  was on 100% nrb, on low dose phenylephrine titrated off this am       PMH:        AST MEDICAL & SURGICAL HISTORY:  HLD (hyperlipidemia)  HTN (hypertension)  Depression      MEDICATIONS  (STANDING):  aspirin 325 milliGRAM(s) Oral daily  atorvastatin 80 milliGRAM(s) Oral at bedtime  cholecalciferol 1000 Unit(s) Oral daily  clopidogrel Tablet 75 milliGRAM(s) Oral daily  enalapril 10 milliGRAM(s) Oral daily  escitalopram 10 milliGRAM(s) Oral daily  heparin  Injectable 5000 Unit(s) SubCutaneous every 8 hours  levothyroxine 88 MICROGram(s) Oral daily  metoprolol tartrate 25 milliGRAM(s) Oral daily  phenylephrine    Infusion 0.2 MICROgram(s)/kG/Min (6.742 mL/Hr) IV Continuous <Continuous>    MEDICATIONS  (PRN):  acetaminophen   Tablet 650 milliGRAM(s) Oral every 6 hours PRN For Temp greater than 38 C (100.4 F)  ondansetron Injectable 4 milliGRAM(s) IV Push every 6 hours PRN Nausea        Height (cm): 172.72 (08-16 @ 16:04)  Weight (kg): 89.9 (08-16 @ 18:32)  BMI (kg/m2): 30.1 (08-16 @ 18:32)    ICU Vital Signs Last 24 Hrs  T(C): 36 (17 Aug 2018 06:09), Max: 36.6 (16 Aug 2018 16:04)  T(F): 96.8 (17 Aug 2018 06:09), Max: 97.9 (16 Aug 2018 16:04)  HR: 63 (17 Aug 2018 09:00) (52 - 73)  BP: 110/47 (17 Aug 2018 09:00) (61/46 - 131/93)  BP(mean): 62 (17 Aug 2018 09:00) (62 - 89)  ABP: --  ABP(mean): --  RR: 20 (17 Aug 2018 09:00) (15 - 27)  SpO2: 92% (17 Aug 2018 09:00) (92% - 100%)    I&O's Summary    16 Aug 2018 07:01  -  17 Aug 2018 07:00  --------------------------------------------------------  IN: 0 mL / OUT: 1200 mL / NET: -1200 mL                          15.8   12.78 )-----------( 260      ( 17 Aug 2018 03:45 )             44.9       08-17    138  |  107  |  18  ----------------------------<  124<H>  4.4   |  24  |  1.12    Ca    8.1<L>      17 Aug 2018 03:45  Phos  3.6     08-17  Mg     2.1     08-17    TPro  7.3  /  Alb  3.6  /  TBili  0.5  /  DBili  x   /  AST  483<H>  /  ALT  80<H>  /  AlkPhos  54  08-17      CARDIAC MARKERS ( 16 Aug 2018 22:01 )  1.400 ng/mL / x     / x     / x     / x      CARDIAC MARKERS ( 16 Aug 2018 19:54 )  0.355 ng/mL / x     / x     / x     / x      CARDIAC MARKERS ( 16 Aug 2018 16:15 )  0.054 ng/mL / x     / x     / x     / x          DVT Prophylaxis:    Heparin sub q                                                             Advanced Directives: Full Code

## 2018-08-17 NOTE — CONSULT NOTE ADULT - ASSESSMENT
53 year old man with CAD s/p PCI- Significnat NSTEMI  complicated by hypotension ( was on pressor support) and with IABP      CAD on asa , plavix and high dose statin  likely removal of IABP tomorrow   Continue with metoprolol and enalapril ( will switch to BID)     repeat limited echo tomorrow for re evaluation of LV function and mitral valve_ if MR still significant will consider  CHARLIE on monday    continue to trend CE   continue monitoring in CCU

## 2018-08-17 NOTE — PROGRESS NOTE ADULT - ASSESSMENT
NSTEMI  CAD. s/p LIONEL of the prox RCA, prox LCx. Medical management of prox LAD 60-70% and D2 90%.  Pulmonary edema  Mod to severe MR, possibly sec to ischemia/ infarct.  HLD  Pulmonary edema.    Suggest:    DAPT  IABP in today. Will D/C tomorrow if hemodynamically and clinically stable.   Diuresis as tolerated by BP.  Wean off phenylepherine   beta blockers if BP permits  Statins  F/u Echo for EF and MR.

## 2018-08-17 NOTE — PROGRESS NOTE ADULT - SUBJECTIVE AND OBJECTIVE BOX
Successful LIONEL of the proximal RCA and the proximal LCx.   Elevated LVEDP. Intra Aortic Balloon Pump placed via the left groin  Diuresis  DAPT  Beta blockers  Statins    D/W family over the phone.

## 2018-08-17 NOTE — PROGRESS NOTE ADULT - ASSESSMENT
1. Patient s/p AMI, s/p stent to circ and RCA, with MR on echo     hypotension improving, titrating off pressors     continue balloon pump today, aspirin, plavix     diuresis as bp allows     on statin     bblocker when bp allows

## 2018-08-17 NOTE — PROGRESS NOTE ADULT - SUBJECTIVE AND OBJECTIVE BOX
pt rec'd by CCU from cath lab  s/p stent to circumflex and RCA  IABP in place  remains on phenylephrine infusion  requiring 100% NRB    feeling better.    d/w Dr Moseley  pt still requires additional intervention.

## 2018-08-17 NOTE — CONSULT NOTE ADULT - SUBJECTIVE AND OBJECTIVE BOX
CHIEF COMPLAINT: Patient is a 53y old  Male who presents with a chief complaint of Chest pain (16 Aug 2018 19:51)      HPI:  Pt is a 52yo M w PMH of HTN, HLD, hypothyroidsm, depression present to ED with resting chest pain. Pt states he has been having resting chest pain on and off for past 3-4 weeks. It is pressure like, mid-sternal 3/10, relieved by rest occures when walking and occasional radiation to left jaw. Today, pt was siting and noticed 4/10 mid chest pain pressure like associated with diaphoresis EMS give 4 Aspirin 81mg, 1 NTG, pain was slowly resolving. Pt denies of any similar symptoms. Denies of any SOB, palpitation, dizziness, fever, chills. Pt is concerned that he gained 15 lb for past 7 months. Denies of any suicidal ideation. No recent travel or sick contact. Pt was never a smoker. Father die of MI at age 60. Mother has stent placement at age 70. \    was seen by Dr Moseley- and underwent  PCI of RCA and LCX ( medical mgmt of LAD and diag)         PMHx: PAST MEDICAL & SURGICAL HISTORY:  HLD (hyperlipidemia)  HTN (hypertension)  Depression  No significant past surgical history    Allergies: Allergies    No Known Allergies    Intolerances          REVIEW OF SYSTEMS:    CONSTITUTIONAL: No weakness, fevers or chills  EYES/ENT: No visual changes;  No vertigo or throat pain   NECK: No pain or stiffness  RESPIRATORY: No cough, wheezing, hemoptysis; No shortness of breath  CARDIOVASCULAR: No chest pain or palpitations  GASTROINTESTINAL: No abdominal or epigastric pain. No nausea, vomiting, or hematemesis; No diarrhea or constipation. No melena or hematochezia.  GENITOURINARY: No dysuria, frequency or hematuria  NEUROLOGICAL: No numbness or weakness  SKIN: No itching, burning, rashes, or lesions   All other review of systems is negative unless indicated above    Vital Signs Last 24 Hrs  T(C): 36.6 (17 Aug 2018 16:00), Max: 36.6 (16 Aug 2018 21:15)  T(F): 97.9 (17 Aug 2018 16:00), Max: 97.9 (16 Aug 2018 21:15)  HR: 67 (17 Aug 2018 17:00) (52 - 69)  BP: 95/64 (17 Aug 2018 17:00) (61/46 - 127/97)  BP(mean): 71 (17 Aug 2018 17:00) (53 - 89)  RR: 25 (17 Aug 2018 17:00) (15 - 28)  SpO2: 96% (17 Aug 2018 17:00) (91% - 97%)    I&O's Summary    16 Aug 2018 07:01  -  17 Aug 2018 07:00  --------------------------------------------------------  IN: 0 mL / OUT: 1200 mL / NET: -1200 mL    17 Aug 2018 07:01  -  17 Aug 2018 17:38  --------------------------------------------------------  IN: 100 mL / OUT: 300 mL / NET: -200 mL            PHYSICAL EXAM:   Constitutional: NAD, awake and alert, well-developed  HEENT: PERR, EOMI, Normal Hearing, MMM  Neck: Soft and supple, No LAD, No JVD  Respiratory: Breath sounds are clear bilaterally, No wheezing, rales or rhonchi  Cardiovascular: S1 and S2, regular rate and rhythm, no Murmurs, gallops or rubs  Gastrointestinal: Bowel Sounds present, soft, nontender, nondistended, no guarding, no rebound  Extremities: No peripheral edema  Vascular: 2+ peripheral pulses  Neurological: A/O x 3, no focal deficits  Musculoskeletal: 5/5 strength b/l upper and lower extremities  Skin: No rashes    MEDICATIONS:  MEDICATIONS  (STANDING):  aspirin 325 milliGRAM(s) Oral daily  atorvastatin 80 milliGRAM(s) Oral at bedtime  cholecalciferol 1000 Unit(s) Oral daily  clopidogrel Tablet 75 milliGRAM(s) Oral daily  enalapril 10 milliGRAM(s) Oral daily  escitalopram 10 milliGRAM(s) Oral daily  heparin  Injectable 5000 Unit(s) SubCutaneous every 8 hours  levothyroxine 88 MICROGram(s) Oral daily  metoprolol tartrate 25 milliGRAM(s) Oral daily  phenylephrine    Infusion 0.2 MICROgram(s)/kG/Min (6.742 mL/Hr) IV Continuous <Continuous>      LABS: All Labs Reviewed:                        15.8   12.78 )-----------( 260      ( 17 Aug 2018 03:45 )             44.9     08-17    138  |  107  |  18  ----------------------------<  124<H>  4.4   |  24  |  1.12    Ca    8.1<L>      17 Aug 2018 03:45  Phos  3.6     08-17  Mg     2.1     08-17    TPro  7.3  /  Alb  3.6  /  TBili  0.5  /  DBili  x   /  AST  483<H>  /  ALT  80<H>  /  AlkPhos  54  08-17    PT/INR - ( 16 Aug 2018 16:15 )   PT: 12.2 sec;   INR: 1.13 ratio         PTT - ( 17 Aug 2018 03:45 )  PTT:42.7 sec  CARDIAC MARKERS ( 17 Aug 2018 09:48 )  129.000 ng/mL / x     / 4605 U/L / x     / x      CARDIAC MARKERS ( 16 Aug 2018 22:01 )  1.400 ng/mL / x     / x     / x     / x      CARDIAC MARKERS ( 16 Aug 2018 19:54 )  0.355 ng/mL / x     / x     / x     / x      CARDIAC MARKERS ( 16 Aug 2018 16:15 )  0.054 ng/mL / x     / x     / x     / x            Blood Culture:   BNP   lipid profile                          08-17 @ 03:45  cholesterol           140 mg/dL  Direct LDL            89 mg/dL  HDL cholesterol       28 mg/dL  HDL/Total cholesterol --  Triglycerides, serum  115 mg/dL      RADIOLOGY:    EKG: SB with anterior ST depression and lateral T valve changes    Telemetry: SR    ECHO:  < from: Transthoracic Echocardiogram (08.16.18 @ 23:22) >  ummary     The left ventricle is normal in size, wall thickness. Basal   inferoalateral   hypokinesis. Estimated left ventricular ejection fraction is 60-65 %.   The left atrium is mildly dilated.   Normal RV function.   Moderate to severe (3+) mitral regurgitation is present. Mild mitral   annular calcification is present.   Moderate (2+) tricuspid valve regurgitation is present.   Moderate pulmonary hypertension.     Signature     ----------------------------------------------------------------   Electronically signed by Zachary Moseley MD(Interpreting   physician) on 08/17/2018 01:17 AM   ----------------------------------------------------------------    < end of copied text >      Cath:   < from: Cardiac Cath Lab - Adult (08.16.18 @ 23:41) >     Impression     Diagnostic Conclusions   Three Vessel coronary artery disease.   Normal LV systolic function. Estimated LV ejection fraction is 60 %.   No aortic valve stenosis.     Elevated left ventricular end-diastolic pressure.     Interventional Conclusions     Successful Coronary Intervention LIONEL of proximal RCA.     Successful Coronary Intervention LIONEL of proximal LCx.     Recommendations     Percutaneous coronary intervention of RCA, LCx today - infarct related   artery.     Aggressive medical management of coronary artery disease and its   underlying risk factors.     Aspirin 325 mg PO daily .   Add clopidogrel (Plavix) 75 mg PO daily.     PCI of LAD, D2 if he has ischemia or angina.    Estimated Blood Loss:5ml.    Complications:  No complication.     Signatures     ----------------------------------------------------------------   Electronically signed by Zachary Moseley MD(Performing   Physician) on 08/17/2018 01:11   ----------------------------------------------------------------    < end of copied text >

## 2018-08-17 NOTE — PROGRESS NOTE ADULT - SUBJECTIVE AND OBJECTIVE BOX
54 yo male admitted for ACS. ER triage at 1604. Seen by hospitalist and admitted to cardiac bed. At 2130 PM developed dizziness, hypotension, diaphoresis. EKG showing ST depressions in the inferior leads. No ST elevations noted. No suggestion of RV infarct on right sided EKG leads. Echo shows basal hypokinesis. Moderate to severe MR, moderate TR, moderate PAH. BP better with IV neosynepherine. PMH of HTN, HLD, hypothyroidsm, depression. Family history positive for early onset CAD. S/P LIONEL of the proximal RCA and the proximal LCx.    Today,      PAST MEDICAL & SURGICAL HISTORY:  HLD (hyperlipidemia)  HTN (hypertension)  Hypothyroidism  Depression  Sleep Apnea  No significant past surgical history   Cardiac cath 6 years ago negative per pt.    CURRENT CARDIAC WORKUP:       Echo:  Nml EF, basal hypokinesis. 3+ MR, 2+ TR, Mod PAH    Cardiac Cath:  3V CAD. s/p LIONEL PCI of the RCA, prox LCx. Medical management of the LAD and D2 for now.    Allergies:   No Known Allergies      MEDICATIONS  (STANDING):  aspirin 325 milliGRAM(s) Oral daily  atorvastatin 80 milliGRAM(s) Oral at bedtime  cholecalciferol 1000 Unit(s) Oral daily  clopidogrel Tablet 75 milliGRAM(s) Oral daily  enalapril 10 milliGRAM(s) Oral daily  escitalopram 10 milliGRAM(s) Oral daily  heparin  Injectable 5000 Unit(s) SubCutaneous every 8 hours  levothyroxine 88 MICROGram(s) Oral daily  metoprolol tartrate 25 milliGRAM(s) Oral daily  phenylephrine    Infusion 0.2 MICROgram(s)/kG/Min (6.742 mL/Hr) IV Continuous    MEDICATIONS  (PRN):  acetaminophen   Tablet 650 milliGRAM(s) Oral every 6 hours PRN For Temp greater than 38 C (100.4 F)  ondansetron Injectable 4 milliGRAM(s) IV Push every 6 hours PRN Nausea      ROS:     Detailed ten system ROS was performed and was negative except for history as eluded to above.    no fever  no chills  no nausea  no vomiting  no diarrhea  no constipation  no melena  no hematochezia  no chest pain  no palpitations  no sob at rest  no dyspnea on exertion  no cough  no wheezing  no anorexia  no headache  no dizziness  no syncope  no weakness  no myalgia  no dysuria  no polyuria  no hematuria       Vital Signs Last 24 Hrs  T(C): 36 (17 Aug 2018 06:09), Max: 36.6 (16 Aug 2018 16:04)  T(F): 96.8 (17 Aug 2018 06:09), Max: 97.9 (16 Aug 2018 16:04)  HR: 63 (17 Aug 2018 08:00) (52 - 73)  BP: 109/69 (17 Aug 2018 08:00) (61/46 - 131/93)  BP(mean): 77 (17 Aug 2018 08:00) (72 - 89)  RR: 23 (17 Aug 2018 08:00) (15 - 27)  SpO2: 97% (17 Aug 2018 08:00) (92% - 100%)    I&O's Summary    16 Aug 2018 07:01  -  17 Aug 2018 07:00  --------------------------------------------------------  IN: 0 mL / OUT: 1200 mL / NET: -1200 mL        PHYSICAL EXAM:    General:                Comfortable, AAO X 3, in no distress.   HEENT:                  Atraumatic, PERRLA, EOMI, conjunctiva clear.   Neck:                     Supple, no adenopathy, no thyromegaly, no JVD, no bruit.  Back:                     Symmetric, non tender.  Chest:                    Clear, B/L symmetric air entry, no tachypnea  Heart:                     S1, S2 normal, no gallop, + murmur.  Abdomen:              Soft, non-tender, bowel sounds active. No palpable masses.  Extremities:           no cyanosis, no edema. Peripheral pulses normal. Right groin no hematoma. Left groin with IABP. No hematoma  Skin:                      Skin color, texture normal. No rashes.  Neurologic:            Grossly nonfocal.       TELEMETRY:  Normal sinus rhythm with no tachy or rory events     ECG:       LABS:                        15.8   12.78 )-----------( 260      ( 17 Aug 2018 03:45 )             44.9     08-17    138  |  107  |  18  ----------------------------<  124<H>  4.4   |  24  |  1.12    Ca    8.1<L>      17 Aug 2018 03:45  Phos  3.6     08-17  Mg     2.1     08-17    TPro  7.3  /  Alb  3.6  /  TBili  0.5  /  DBili  x   /  AST  483<H>  /  ALT  80<H>  /  AlkPhos  54  08-17 08-16 @ 22:01  Trop-I 1.400    08-16 @ 19:54  Trop-I 0.355    08-16 @ 16:15  Trop-I 0.054      PT/INR - ( 16 Aug 2018 16:15 )   PT: 12.2 sec;   INR: 1.13 ratio    PTT - ( 17 Aug 2018 03:45 )  PTT:42.7 sec      RADIOLOGY & ADDITIONAL STUDIES:    CT Angio Abdomen and Pelvis w/ IV Cont (08.16.18 @ 23:11) >    IMPRESSION:      No aortic aneurysm or dissection.    Colonic diverticulosis without evidence of diverticulitis. No bowel obstruction or gross bowel wall thickening.    Patchy groundglass opacities in the posterior bilateral upper lobes and dependent portion of lower lungs with suggestion of interlobular septal thickening may represent dependent lung changes versus pulmonary edema versus less likely infectious or inflammatory etiology. No segmental consolidations 52 yo male admitted for ACS. ER triage at 1604. Seen by hospitalist and admitted to cardiac bed. At 2130 PM developed dizziness, hypotension, diaphoresis. EKG showing ST depressions in the inferior leads. No ST elevations noted. No suggestion of RV infarct on right sided EKG leads. Echo shows basal hypokinesis. Moderate to severe MR, moderate TR, moderate PAH. BP better with IV neosynepherine. PMH of HTN, HLD, hypothyroidsm, depression. Family history positive for early onset CAD. S/P LIONEL of the proximal RCA and the proximal LCx.    Today,  no chest pain. Feels better. IABP in. No hematoma either groin.     PAST MEDICAL & SURGICAL HISTORY:  HLD (hyperlipidemia)  HTN (hypertension)  Hypothyroidism  Depression  Sleep Apnea  No significant past surgical history   Cardiac cath 6 years ago negative per pt.    CURRENT CARDIAC WORKUP:       Echo:  Nml EF, basal hypokinesis. 3+ MR, 2+ TR, Mod PAH    Cardiac Cath:  3V CAD. s/p LIONEL PCI of the RCA, prox LCx. Medical management of the LAD and D2 for now.    Allergies:   No Known Allergies      MEDICATIONS  (STANDING):  aspirin 325 milliGRAM(s) Oral daily  atorvastatin 80 milliGRAM(s) Oral at bedtime  cholecalciferol 1000 Unit(s) Oral daily  clopidogrel Tablet 75 milliGRAM(s) Oral daily  enalapril 10 milliGRAM(s) Oral daily  escitalopram 10 milliGRAM(s) Oral daily  heparin  Injectable 5000 Unit(s) SubCutaneous every 8 hours  levothyroxine 88 MICROGram(s) Oral daily  metoprolol tartrate 25 milliGRAM(s) Oral daily  phenylephrine    Infusion 0.2 MICROgram(s)/kG/Min (6.742 mL/Hr) IV Continuous    MEDICATIONS  (PRN):  acetaminophen   Tablet 650 milliGRAM(s) Oral every 6 hours PRN For Temp greater than 38 C (100.4 F)  ondansetron Injectable 4 milliGRAM(s) IV Push every 6 hours PRN Nausea      ROS:     Detailed ten system ROS was performed and was negative except for history as eluded to above.    no fever  no chills  no nausea  no vomiting  no diarrhea  no constipation  no melena  no hematochezia  no chest pain  no palpitations  no sob at rest  no dyspnea on exertion  no cough  no wheezing  no anorexia  no headache  no dizziness  no syncope  no weakness  no myalgia  no dysuria  no polyuria  no hematuria       Vital Signs Last 24 Hrs  T(C): 36 (17 Aug 2018 06:09), Max: 36.6 (16 Aug 2018 16:04)  T(F): 96.8 (17 Aug 2018 06:09), Max: 97.9 (16 Aug 2018 16:04)  HR: 63 (17 Aug 2018 08:00) (52 - 73)  BP: 109/69 (17 Aug 2018 08:00) (61/46 - 131/93)  BP(mean): 77 (17 Aug 2018 08:00) (72 - 89)  RR: 23 (17 Aug 2018 08:00) (15 - 27)  SpO2: 97% (17 Aug 2018 08:00) (92% - 100%)    I&O's Summary    16 Aug 2018 07:01  -  17 Aug 2018 07:00  --------------------------------------------------------  IN: 0 mL / OUT: 1200 mL / NET: -1200 mL        PHYSICAL EXAM:    General:                Comfortable, AAO X 3, in no distress.   HEENT:                  Atraumatic, PERRLA, EOMI, conjunctiva clear.   Neck:                     Supple, no adenopathy, no thyromegaly, no JVD, no bruit.  Back:                     Symmetric, non tender.  Chest:                    Clear, B/L symmetric air entry, no tachypnea  Heart:                     S1, S2 normal, no gallop, + murmur.  Abdomen:              Soft, non-tender, bowel sounds active. No palpable masses.  Extremities:           no cyanosis, no edema. Peripheral pulses normal. Right groin no hematoma. Left groin with IABP. No hematoma  Skin:                      Skin color, texture normal. No rashes.  Neurologic:            Grossly nonfocal.       TELEMETRY:  Normal sinus rhythm with no tachy or rory events     ECG:       LABS:                        15.8   12.78 )-----------( 260      ( 17 Aug 2018 03:45 )             44.9     08-17    138  |  107  |  18  ----------------------------<  124<H>  4.4   |  24  |  1.12    Ca    8.1<L>      17 Aug 2018 03:45  Phos  3.6     08-17  Mg     2.1     08-17    TPro  7.3  /  Alb  3.6  /  TBili  0.5  /  DBili  x   /  AST  483<H>  /  ALT  80<H>  /  AlkPhos  54  08-17      08-16 @ 22:01  Trop-I 1.400    08-16 @ 19:54  Trop-I 0.355    08-16 @ 16:15  Trop-I 0.054      PT/INR - ( 16 Aug 2018 16:15 )   PT: 12.2 sec;   INR: 1.13 ratio    PTT - ( 17 Aug 2018 03:45 )  PTT:42.7 sec      RADIOLOGY & ADDITIONAL STUDIES:    CT Angio Abdomen and Pelvis w/ IV Cont (08.16.18 @ 23:11) >    IMPRESSION:      No aortic aneurysm or dissection.    Colonic diverticulosis without evidence of diverticulitis. No bowel obstruction or gross bowel wall thickening.    Patchy groundglass opacities in the posterior bilateral upper lobes and dependent portion of lower lungs with suggestion of interlobular septal thickening may represent dependent lung changes versus pulmonary edema versus less likely infectious or inflammatory etiology. No segmental consolidations

## 2018-08-18 LAB
ANION GAP SERPL CALC-SCNC: 11 MMOL/L — SIGNIFICANT CHANGE UP (ref 5–17)
BUN SERPL-MCNC: 20 MG/DL — SIGNIFICANT CHANGE UP (ref 7–23)
CALCIUM SERPL-MCNC: 8.7 MG/DL — SIGNIFICANT CHANGE UP (ref 8.5–10.1)
CHLORIDE SERPL-SCNC: 104 MMOL/L — SIGNIFICANT CHANGE UP (ref 96–108)
CO2 SERPL-SCNC: 24 MMOL/L — SIGNIFICANT CHANGE UP (ref 22–31)
CREAT SERPL-MCNC: 1.18 MG/DL — SIGNIFICANT CHANGE UP (ref 0.5–1.3)
GLUCOSE SERPL-MCNC: 95 MG/DL — SIGNIFICANT CHANGE UP (ref 70–99)
HCT VFR BLD CALC: 41.4 % — SIGNIFICANT CHANGE UP (ref 39–50)
HGB BLD-MCNC: 14.3 G/DL — SIGNIFICANT CHANGE UP (ref 13–17)
MCHC RBC-ENTMCNC: 31.8 PG — SIGNIFICANT CHANGE UP (ref 27–34)
MCHC RBC-ENTMCNC: 34.5 GM/DL — SIGNIFICANT CHANGE UP (ref 32–36)
MCV RBC AUTO: 92 FL — SIGNIFICANT CHANGE UP (ref 80–100)
NRBC # BLD: 0 /100 WBCS — SIGNIFICANT CHANGE UP (ref 0–0)
PLATELET # BLD AUTO: 195 K/UL — SIGNIFICANT CHANGE UP (ref 150–400)
POTASSIUM SERPL-MCNC: 3.7 MMOL/L — SIGNIFICANT CHANGE UP (ref 3.5–5.3)
POTASSIUM SERPL-SCNC: 3.7 MMOL/L — SIGNIFICANT CHANGE UP (ref 3.5–5.3)
RBC # BLD: 4.5 M/UL — SIGNIFICANT CHANGE UP (ref 4.2–5.8)
RBC # FLD: 13.3 % — SIGNIFICANT CHANGE UP (ref 10.3–14.5)
SODIUM SERPL-SCNC: 139 MMOL/L — SIGNIFICANT CHANGE UP (ref 135–145)
WBC # BLD: 10.78 K/UL — HIGH (ref 3.8–10.5)
WBC # FLD AUTO: 10.78 K/UL — HIGH (ref 3.8–10.5)

## 2018-08-18 PROCEDURE — 71045 X-RAY EXAM CHEST 1 VIEW: CPT | Mod: 26

## 2018-08-18 PROCEDURE — 93308 TTE F-UP OR LMTD: CPT | Mod: 26

## 2018-08-18 PROCEDURE — 93010 ELECTROCARDIOGRAM REPORT: CPT

## 2018-08-18 RX ORDER — FUROSEMIDE 40 MG
20 TABLET ORAL ONCE
Qty: 0 | Refills: 0 | Status: COMPLETED | OUTPATIENT
Start: 2018-08-18 | End: 2018-08-18

## 2018-08-18 RX ADMIN — Medication 5 MILLIGRAM(S): at 17:17

## 2018-08-18 RX ADMIN — HEPARIN SODIUM 5000 UNIT(S): 5000 INJECTION INTRAVENOUS; SUBCUTANEOUS at 21:29

## 2018-08-18 RX ADMIN — HEPARIN SODIUM 5000 UNIT(S): 5000 INJECTION INTRAVENOUS; SUBCUTANEOUS at 15:32

## 2018-08-18 RX ADMIN — HEPARIN SODIUM 5000 UNIT(S): 5000 INJECTION INTRAVENOUS; SUBCUTANEOUS at 06:35

## 2018-08-18 RX ADMIN — Medication 20 MILLIGRAM(S): at 10:18

## 2018-08-18 RX ADMIN — Medication 1000 UNIT(S): at 11:41

## 2018-08-18 RX ADMIN — ATORVASTATIN CALCIUM 80 MILLIGRAM(S): 80 TABLET, FILM COATED ORAL at 21:28

## 2018-08-18 RX ADMIN — Medication 12.5 MILLIGRAM(S): at 06:35

## 2018-08-18 RX ADMIN — ESCITALOPRAM OXALATE 10 MILLIGRAM(S): 10 TABLET, FILM COATED ORAL at 11:41

## 2018-08-18 RX ADMIN — CLOPIDOGREL BISULFATE 75 MILLIGRAM(S): 75 TABLET, FILM COATED ORAL at 11:41

## 2018-08-18 RX ADMIN — Medication 88 MICROGRAM(S): at 06:35

## 2018-08-18 RX ADMIN — Medication 325 MILLIGRAM(S): at 11:41

## 2018-08-18 RX ADMIN — Medication 5 MILLIGRAM(S): at 06:35

## 2018-08-18 NOTE — PROGRESS NOTE ADULT - SUBJECTIVE AND OBJECTIVE BOX
CHIEF COMPLAINT: Patient is a 53y old  Male who presents with a chief complaint of Chest pain (16 Aug 2018 19:51)      HPI:  Pt is a 52yo M w PMH of HTN, HLD, hypothyroidsm, depression present to ED with resting chest pain. Pt states he has been having resting chest pain on and off for past 3-4 weeks. It is pressure like, mid-sternal 3/10, relieved by rest occures when walking and occasional radiation to left jaw. Today, pt was siting and noticed 4/10 mid chest pain pressure like associated with diaphoresis EMS give 4 Aspirine 81mg, 1 NTG, pain was slowly resolving. Pt denies of any similar symptoms. Denies of any SOB, palpitation, dizziness, fever, chills. Pt is concerned that he gained 15 lb for past 7 months. Denies of any suicidal ideation. No recent travel or sick contact. Pt was never a smoker. Father die of MI at age 60. Mother has stent placement at age 70.     At ED  Trop 0.054  s/p morphine relieved pain   EKG showed ST depression at Lead II, III, aVF    Case discussed with Dr. Gonzalez, will start Heparin drip, loading with Plavix (16 Aug 2018 19:51)    8/18-patient seen by Dr. Gonzalez and Dr Moseley was called in for AMI-stented Circ and RCA.  IABP placed due to hypotension and  severe MR noted.  Thought to be ischemic.  IABP removed today by Dr. Moseley and repeat ECHO with at least moderate MR.  Daig lesion still needs to be stented at future date.  Currently feeling better.      PMHx: PAST MEDICAL & SURGICAL HISTORY:  HLD (hyperlipidemia)  HTN (hypertension)  Depression  No significant past surgical history      Allergies: Allergies    No Known Allergies    Intolerances          REVIEW OF SYSTEMS:    CONSTITUTIONAL: No weakness, fevers or chills  EYES/ENT: No visual changes;  No vertigo or throat pain   NECK: No pain or stiffness  RESPIRATORY:  shortness of breath  CARDIOVASCULAR: No chest pain or palpitations  GASTROINTESTINAL: No abdominal or epigastric pain. No nausea, vomiting, or hematemesis; No diarrhea or constipation. No melena or hematochezia.  GENITOURINARY: No dysuria, frequency or hematuria  NEUROLOGICAL: No numbness or weakness  SKIN: No itching, burning, rashes, or lesions   All other review of systems is negative unless indicated above    Vital Signs Last 24 Hrs  T(C): 36.8 (18 Aug 2018 08:04), Max: 37.8 (18 Aug 2018 06:00)  T(F): 98.3 (18 Aug 2018 08:04), Max: 100.1 (18 Aug 2018 06:00)  HR: 70 (18 Aug 2018 09:00) (61 - 81)  BP: 107/68 (18 Aug 2018 09:00) (90/65 - 112/51)  BP(mean): 77 (18 Aug 2018 09:00) (53 - 85)  RR: 24 (18 Aug 2018 09:00) (17 - 30)  SpO2: 97% (18 Aug 2018 09:00) (90% - 99%)    I&O's Summary    17 Aug 2018 07:01  -  18 Aug 2018 07:00  --------------------------------------------------------  IN: 400 mL / OUT: 1450 mL / NET: -1050 mL            PHYSICAL EXAM:   Constitutional: NAD, awake and alert, well-developed  HEENT: PERR, EOMI, Normal Hearing, MMM  Neck:JVD  Respiratory: Breath sounds  rales   Cardiovascular: S1 and S2, regular rate and rhythm, Murmurs rubs  Gastrointestinal: Bowel Sounds present, soft, nontender, nondistended, no guarding, no rebound  Extremities: No peripheral edema  Vascular: 2+ peripheral pulses  Neurological: A/O x 3, no focal deficits  Musculoskeletal: 5/5 strength b/l upper and lower extremities  Skin: No rashes    MEDICATIONS:  MEDICATIONS  (STANDING):  aspirin 325 milliGRAM(s) Oral daily  atorvastatin 80 milliGRAM(s) Oral at bedtime  cholecalciferol 1000 Unit(s) Oral daily  clopidogrel Tablet 75 milliGRAM(s) Oral daily  enalapril 5 milliGRAM(s) Oral every 12 hours  escitalopram 10 milliGRAM(s) Oral daily  furosemide   Injectable 20 milliGRAM(s) IV Push once  heparin  Injectable 5000 Unit(s) SubCutaneous every 8 hours  levothyroxine 88 MICROGram(s) Oral daily  metoprolol tartrate 12.5 milliGRAM(s) Oral two times a day      LABS: All Labs Reviewed:                        14.3   10.78 )-----------( 195      ( 18 Aug 2018 06:35 )             41.4     08-18    139  |  104  |  20  ----------------------------<  95  3.7   |  24  |  1.18    Ca    8.7      18 Aug 2018 06:35  Phos  3.6     08-17  Mg     2.1     08-17    TPro  7.3  /  Alb  3.6  /  TBili  0.5  /  DBili  x   /  AST  483<H>  /  ALT  80<H>  /  AlkPhos  54  08-17    PT/INR - ( 16 Aug 2018 16:15 )   PT: 12.2 sec;   INR: 1.13 ratio         PTT - ( 17 Aug 2018 03:45 )  PTT:42.7 sec  CARDIAC MARKERS ( 17 Aug 2018 17:47 )  97.000 ng/mL / x     / x     / x     / x      CARDIAC MARKERS ( 17 Aug 2018 09:48 )  129.000 ng/mL / x     / 4605 U/L / x     / x      CARDIAC MARKERS ( 16 Aug 2018 22:01 )  1.400 ng/mL / x     / x     / x     / x      CARDIAC MARKERS ( 16 Aug 2018 19:54 )  0.355 ng/mL / x     / x     / x     / x      CARDIAC MARKERS ( 16 Aug 2018 16:15 )  0.054 ng/mL / x     / x     / x     / x          Blood Culture:     lipid profile          08-17 @ 03:45  cholesterol 140 mg/dL  direct LDL 89 mg/dL  HDL 28 mg/dL  HDL/total cholesterol --  Triglyceride, serum 115 mg/dL    BNP     RADIOLOGY:    EKG:      Telemetry:    ECHO:  < from: Transthoracic Echocardiogram (08.16.18 @ 23:22) >   The left ventricle is normal in size, wall thickness. Basal   inferoalateral   hypokinesis. Estimated left ventricular ejection fraction is 60-65 %.   The left atrium is mildly dilated.   Normal RV function.   Moderate to severe (3+) mitral regurgitation is present. Mild mitral   annular calcification is present.   Moderate (2+) tricuspid valve regurgitation is present.   Moderate pulmonary hypertension.    < end of copied text >    Repeat ECHO pending

## 2018-08-18 NOTE — PROGRESS NOTE ADULT - SUBJECTIVE AND OBJECTIVE BOX
Events Overnight: Blood pressure is ok, balloon pump in, No complaints of Chest Pain or shortness of breathe.    HPI:     53 year old male with hx. HTN, HLD, hypothyroidism, presented with chest pain, with st depressions, positive troponins, echo showed basal akinesis, mitral regurgitations,     had cath and received LIONEL to RCA, and circumflex, had diffuse LAD disease not requiring intubation. Post cath patient had IABP placed.    breathng improved, no chest pain, bp ok      PMH:      HLD (hyperlipidemia)  HTN (hypertension)  Depression    ROS -  Patient with minimal dyspnea, no chest pain, no abdominal pain             no dysuria, no abdominal pain             no extremety pain    MEDICATIONS  (STANDING):  aspirin 325 milliGRAM(s) Oral daily  atorvastatin 80 milliGRAM(s) Oral at bedtime  cholecalciferol 1000 Unit(s) Oral daily  clopidogrel Tablet 75 milliGRAM(s) Oral daily  enalapril 5 milliGRAM(s) Oral every 12 hours  escitalopram 10 milliGRAM(s) Oral daily  heparin  Injectable 5000 Unit(s) SubCutaneous every 8 hours  levothyroxine 88 MICROGram(s) Oral daily  metoprolol tartrate 12.5 milliGRAM(s) Oral two times a day  phenylephrine    Infusion 0.2 MICROgram(s)/kG/Min (6.742 mL/Hr) IV Continuous <Continuous>    MEDICATIONS  (PRN):  acetaminophen   Tablet 650 milliGRAM(s) Oral every 6 hours PRN For Temp greater than 38 C (100.4 F)  ondansetron Injectable 4 milliGRAM(s) IV Push every 6 hours PRN Nausea      ICU Vital Signs Last 24 Hrs  T(C): 36.8 (18 Aug 2018 08:04), Max: 37.8 (18 Aug 2018 06:00)  T(F): 98.3 (18 Aug 2018 08:04), Max: 100.1 (18 Aug 2018 06:00)  HR: 75 (18 Aug 2018 08:00) (61 - 81)  BP: 99/58 (18 Aug 2018 08:00) (90/65 - 112/51)  BP(mean): 68 (18 Aug 2018 08:00) (53 - 85)  ABP: --  ABP(mean): --  RR: 29 (18 Aug 2018 08:00) (17 - 30)  SpO2: 93% (18 Aug 2018 08:00) (90% - 99%)        I&O's Summary    17 Aug 2018 07:01  -  18 Aug 2018 07:00  --------------------------------------------------------  IN: 400 mL / OUT: 1450 mL / NET: -1050 mL                          14.3   10.78 )-----------( 195      ( 18 Aug 2018 06:35 )             41.4     08-18    139  |  104  |  20  ----------------------------<  95  3.7   |  24  |  1.18    Ca    8.7      18 Aug 2018 06:35  Phos  3.6     08-17  Mg     2.1     08-17    TPro  7.3  /  Alb  3.6  /  TBili  0.5  /  DBili  x   /  AST  483<H>  /  ALT  80<H>  /  AlkPhos  54  08-17      CARDIAC MARKERS ( 17 Aug 2018 17:47 )  97.000 ng/mL / x     / x     / x     / x      CARDIAC MARKERS ( 17 Aug 2018 09:48 )  129.000 ng/mL / x     / 4605 U/L / x     / x      CARDIAC MARKERS ( 16 Aug 2018 22:01 )  1.400 ng/mL / x     / x     / x     / x      CARDIAC MARKERS ( 16 Aug 2018 19:54 )  0.355 ng/mL / x     / x     / x     / x      CARDIAC MARKERS ( 16 Aug 2018 16:15 )  0.054 ng/mL / x     / x     / x     / x        DVT Prophylaxis:  Heparin subq                                                                Advanced Directives: Full Code

## 2018-08-18 NOTE — PROGRESS NOTE ADULT - ASSESSMENT
NSTEMI  CAD. s/p LIONEL of the prox RCA, prox LCx. Medical management of prox LAD 60-70% and D2 90%.  Pulmonary edema  Mod to severe MR, possibly sec to ischemia/ infarct.  HLD  Pulmonary edema.  Fever , possibly post MI    Suggest:    DAPT  IABP to be discontinued today.  Diuresis as tolerated by BP.  ACEi and beta blockers as BP permits  Statins  F/u Echo for EF and MR.   Tylenol for fever. Most likely post MI. Will follow. Blood cultures can be sent.

## 2018-08-18 NOTE — PROGRESS NOTE ADULT - ASSESSMENT
1. Patient s/p Acute Stemi,      s/p stent to rca, lcx,      on aspirin, plavix      iabp      mitral regurgitation      balloon pump to be d/c 'd later today  repeat echo to evaluate mitral regurgitation  bblocker, ace inhibitor  diuresis as able

## 2018-08-18 NOTE — PROGRESS NOTE ADULT - SUBJECTIVE AND OBJECTIVE BOX
52 yo male admitted for ACS. ER triage at 1604. Seen by hospitalist and admitted to cardiac bed. At 2130 PM developed dizziness, hypotension, diaphoresis. EKG showing ST depressions in the inferior leads. No ST elevations noted. No suggestion of RV infarct on right sided EKG leads. Echo shows basal hypokinesis. Moderate to severe MR, moderate TR, moderate PAH. BP better with IV neosynepherine. PMH of HTN, HLD, hypothyroidsm, depression. Family history positive for early onset CAD. S/P LIONEL of the proximal RCA and the proximal LCx.    Today,      PAST MEDICAL & SURGICAL HISTORY:  HLD (hyperlipidemia)  HTN (hypertension)  Hypothyroidism  Depression  Sleep Apnea  No significant past surgical history   Cardiac cath 6 years ago negative per pt.    CURRENT CARDIAC WORKUP:       Echo:  Nml EF, basal hypokinesis. 3+ MR, 2+ TR, Mod PAH    Cardiac Cath:  3V CAD. s/p LIONEL PCI of the RCA, prox LCx. Medical management of the LAD and D2 for now.      Allergies:   No Known Allergies      MEDICATIONS  (STANDING):  aspirin 325 milliGRAM(s) Oral daily  atorvastatin 80 milliGRAM(s) Oral at bedtime  cholecalciferol 1000 Unit(s) Oral daily  clopidogrel Tablet 75 milliGRAM(s) Oral daily  enalapril 5 milliGRAM(s) Oral every 12 hours  escitalopram 10 milliGRAM(s) Oral daily  heparin  Injectable 5000 Unit(s) SubCutaneous every 8 hours  levothyroxine 88 MICROGram(s) Oral daily  metoprolol tartrate 12.5 milliGRAM(s) Oral two times a day  phenylephrine    Infusion 0.2 MICROgram(s)/kG/Min (6.742 mL/Hr) IV Continuous <Continuous>    MEDICATIONS  (PRN):  acetaminophen   Tablet 650 milliGRAM(s) Oral every 6 hours PRN For Temp greater than 38 C (100.4 F)  ondansetron Injectable 4 milliGRAM(s) IV Push every 6 hours PRN Nausea      ROS:     Detailed ten system ROS was performed and was negative except for history as eluded to above.    no fever  no chills  no nausea  no vomiting  no diarrhea  no constipation  no melena  no hematochezia  no chest pain  no palpitations  no sob at rest  no dyspnea on exertion  no cough  no wheezing  no anorexia  no headache  no dizziness  no syncope  no weakness  no myalgia  no dysuria  no polyuria  no hematuria       Vital Signs Last 24 Hrs  T(C): 37.8 (18 Aug 2018 06:00), Max: 37.8 (18 Aug 2018 06:00)  T(F): 100.1 (18 Aug 2018 06:00), Max: 100.1 (18 Aug 2018 06:00)  HR: 78 (18 Aug 2018 07:00) (61 - 81)  BP: 100/53 (18 Aug 2018 07:00) (90/65 - 112/51)  BP(mean): 65 (18 Aug 2018 07:00) (53 - 85)  RR: 29 (18 Aug 2018 07:00) (17 - 30)  SpO2: 99% (18 Aug 2018 07:00) (90% - 99%)    I&O's Summary    17 Aug 2018 07:01  -  18 Aug 2018 07:00  --------------------------------------------------------  IN: 400 mL / OUT: 1450 mL / NET: -1050 mL        PHYSICAL EXAM:    General:                Comfortable, AAO X 3, in no distress.   HEENT:                  Atraumatic, PERRLA, EOMI, conjunctiva clear.   Neck:                     Supple, no adenopathy, no thyromegaly, no JVD, no bruit.  Back:                     Symmetric, non tender.  Chest:                    Clear, B/L symmetric air entry, no tachypnea  Heart:                     S1, S2 normal, no gallop, + murmur.  Abdomen:              Soft, non-tender, bowel sounds active. No palpable masses.  Extremities:           no cyanosis, no edema. Peripheral pulses normal. Right groin no hematoma. Left groin with IABP. No hematoma  Skin:                      Skin color, texture normal. No rashes.  Neurologic:            Grossly nonfocal.       TELEMETRY:  Normal sinus rhythm with no tachy or rory events     ECG:      LABS:                        14.3   10.78 )-----------( 195      ( 18 Aug 2018 06:35 )             41.4     08-18    139  |  104  |  20  ----------------------------<  95  3.7   |  24  |  1.18    Ca    8.7      18 Aug 2018 06:35  Phos  3.6     08-17  Mg     2.1     08-17    TPro  7.3  /  Alb  3.6  /  TBili  0.5  /  DBili  x   /  AST  483<H>  /  ALT  80<H>  /  AlkPhos  54  08-17      Lipid Panel  Chl 140  HDL 28  LDL 89  Trg 115        08-17 @ 17:47  Trop-I 97.000    08-17 @ 09:48  Trop-I 129.000  CK     4605    08-16 @ 22:01  Trop-I 1.400    08-16 @ 19:54  Trop-I 0.355    08-16 @ 16:15  Trop-I 0.054      PT/INR - ( 16 Aug 2018 16:15 )   PT: 12.2 sec;   INR: 1.13 ratio    PTT - ( 17 Aug 2018 03:45 )  PTT:42.7 sec 54 yo male admitted for ACS. ER triage at 1604. Seen by hospitalist and admitted to cardiac bed. At 2130 PM developed dizziness, hypotension, diaphoresis. EKG showing ST depressions in the inferior leads. No ST elevations noted. No suggestion of RV infarct on right sided EKG leads. Echo shows basal hypokinesis. Moderate to severe MR, moderate TR, moderate PAH. BP better with IV neosynepherine. PMH of HTN, HLD, hypothyroidsm, depression. Family history positive for early onset CAD. S/P LIONEL of the proximal RCA and the proximal LCx.    Today,  no new complaints. Feels fine. BP stable on IABP  standby.    PAST MEDICAL & SURGICAL HISTORY:  HLD (hyperlipidemia)  HTN (hypertension)  Hypothyroidism  Depression  Sleep Apnea  No significant past surgical history   Cardiac cath 6 years ago negative per pt.    CURRENT CARDIAC WORKUP:       Echo:  Nml EF, basal hypokinesis. 3+ MR, 2+ TR, Mod PAH    Cardiac Cath:  3V CAD. s/p LIONEL PCI of the RCA, prox LCx. Medical management of the LAD and D2 for now.      Allergies:   No Known Allergies      MEDICATIONS  (STANDING):  aspirin 325 milliGRAM(s) Oral daily  atorvastatin 80 milliGRAM(s) Oral at bedtime  cholecalciferol 1000 Unit(s) Oral daily  clopidogrel Tablet 75 milliGRAM(s) Oral daily  enalapril 5 milliGRAM(s) Oral every 12 hours  escitalopram 10 milliGRAM(s) Oral daily  heparin  Injectable 5000 Unit(s) SubCutaneous every 8 hours  levothyroxine 88 MICROGram(s) Oral daily  metoprolol tartrate 12.5 milliGRAM(s) Oral two times a day  phenylephrine    Infusion 0.2 MICROgram(s)/kG/Min (6.742 mL/Hr) IV Continuous <Continuous>    MEDICATIONS  (PRN):  acetaminophen   Tablet 650 milliGRAM(s) Oral every 6 hours PRN For Temp greater than 38 C (100.4 F)  ondansetron Injectable 4 milliGRAM(s) IV Push every 6 hours PRN Nausea      ROS:     Detailed ten system ROS was performed and was negative except for history as eluded to above.    no fever  no chills  no nausea  no vomiting  no diarrhea  no constipation  no melena  no hematochezia  no chest pain  no palpitations  no sob at rest  no dyspnea on exertion  no cough  no wheezing  no anorexia  no headache  no dizziness  no syncope  no weakness  no myalgia  no dysuria  no polyuria  no hematuria       Vital Signs Last 24 Hrs  T(C): 37.8 (18 Aug 2018 06:00), Max: 37.8 (18 Aug 2018 06:00)  T(F): 100.1 (18 Aug 2018 06:00), Max: 100.1 (18 Aug 2018 06:00)  HR: 78 (18 Aug 2018 07:00) (61 - 81)  BP: 100/53 (18 Aug 2018 07:00) (90/65 - 112/51)  BP(mean): 65 (18 Aug 2018 07:00) (53 - 85)  RR: 29 (18 Aug 2018 07:00) (17 - 30)  SpO2: 99% (18 Aug 2018 07:00) (90% - 99%)    I&O's Summary    17 Aug 2018 07:01  -  18 Aug 2018 07:00  --------------------------------------------------------  IN: 400 mL / OUT: 1450 mL / NET: -1050 mL        PHYSICAL EXAM:    General:                Comfortable, AAO X 3, in no distress.   HEENT:                  Atraumatic, PERRLA, EOMI, conjunctiva clear.   Neck:                     Supple, no adenopathy, no thyromegaly, no JVD, no bruit.  Back:                     Symmetric, non tender.  Chest:                    Clear, B/L symmetric air entry, no tachypnea  Heart:                     S1, S2 normal, no gallop, + murmur.  Abdomen:              Soft, non-tender, bowel sounds active. No palpable masses.  Extremities:           no cyanosis, no edema. Peripheral pulses normal. Right groin no hematoma. Left groin with IABP. No hematoma  Skin:                      Skin color, texture normal. No rashes.  Neurologic:            Grossly nonfocal.       TELEMETRY:  Normal sinus rhythm with no tachy or rory events     ECG:      LABS:                        14.3   10.78 )-----------( 195      ( 18 Aug 2018 06:35 )             41.4     08-18    139  |  104  |  20  ----------------------------<  95  3.7   |  24  |  1.18    Ca    8.7      18 Aug 2018 06:35  Phos  3.6     08-17  Mg     2.1     08-17    TPro  7.3  /  Alb  3.6  /  TBili  0.5  /  DBili  x   /  AST  483<H>  /  ALT  80<H>  /  AlkPhos  54  08-17      Lipid Panel  Chl 140  HDL 28  LDL 89  Trg 115        08-17 @ 17:47  Trop-I 97.000    08-17 @ 09:48  Trop-I 129.000  CK     4605    08-16 @ 22:01  Trop-I 1.400    08-16 @ 19:54  Trop-I 0.355    08-16 @ 16:15  Trop-I 0.054      PT/INR - ( 16 Aug 2018 16:15 )   PT: 12.2 sec;   INR: 1.13 ratio    PTT - ( 17 Aug 2018 03:45 )  PTT:42.7 sec

## 2018-08-18 NOTE — PROGRESS NOTE ADULT - ASSESSMENT
patient with AMI-large with troponin's in the 120's  1-Valve disease/CHF-repeat ECHO today.  Will look at LV function as well as MR.  May need CHARLIE.  If EF is 40%, would need entresto over ACE-I, continue lasix and enalapril for now  2-CAD-continue ischemic treatement  3-continue tele for arrhythmia monitorining  just had IABP removed-continue observation in CCU  Would use Lasix 40 IV daily but care with hypotension

## 2018-08-19 LAB
ANION GAP SERPL CALC-SCNC: 8 MMOL/L — SIGNIFICANT CHANGE UP (ref 5–17)
BUN SERPL-MCNC: 18 MG/DL — SIGNIFICANT CHANGE UP (ref 7–23)
CALCIUM SERPL-MCNC: 8.8 MG/DL — SIGNIFICANT CHANGE UP (ref 8.5–10.1)
CHLORIDE SERPL-SCNC: 103 MMOL/L — SIGNIFICANT CHANGE UP (ref 96–108)
CO2 SERPL-SCNC: 27 MMOL/L — SIGNIFICANT CHANGE UP (ref 22–31)
CREAT SERPL-MCNC: 0.99 MG/DL — SIGNIFICANT CHANGE UP (ref 0.5–1.3)
GLUCOSE SERPL-MCNC: 97 MG/DL — SIGNIFICANT CHANGE UP (ref 70–99)
HCT VFR BLD CALC: 40.4 % — SIGNIFICANT CHANGE UP (ref 39–50)
HGB BLD-MCNC: 13.8 G/DL — SIGNIFICANT CHANGE UP (ref 13–17)
MCHC RBC-ENTMCNC: 31.1 PG — SIGNIFICANT CHANGE UP (ref 27–34)
MCHC RBC-ENTMCNC: 34.2 GM/DL — SIGNIFICANT CHANGE UP (ref 32–36)
MCV RBC AUTO: 91 FL — SIGNIFICANT CHANGE UP (ref 80–100)
NRBC # BLD: 0 /100 WBCS — SIGNIFICANT CHANGE UP (ref 0–0)
PLATELET # BLD AUTO: 173 K/UL — SIGNIFICANT CHANGE UP (ref 150–400)
POTASSIUM SERPL-MCNC: 3.6 MMOL/L — SIGNIFICANT CHANGE UP (ref 3.5–5.3)
POTASSIUM SERPL-SCNC: 3.6 MMOL/L — SIGNIFICANT CHANGE UP (ref 3.5–5.3)
RBC # BLD: 4.44 M/UL — SIGNIFICANT CHANGE UP (ref 4.2–5.8)
RBC # FLD: 13.2 % — SIGNIFICANT CHANGE UP (ref 10.3–14.5)
SODIUM SERPL-SCNC: 138 MMOL/L — SIGNIFICANT CHANGE UP (ref 135–145)
TROPONIN I SERPL-MCNC: 23.9 NG/ML — HIGH (ref 0.01–0.04)
WBC # BLD: 9.91 K/UL — SIGNIFICANT CHANGE UP (ref 3.8–10.5)
WBC # FLD AUTO: 9.91 K/UL — SIGNIFICANT CHANGE UP (ref 3.8–10.5)

## 2018-08-19 PROCEDURE — 93010 ELECTROCARDIOGRAM REPORT: CPT

## 2018-08-19 RX ORDER — METOPROLOL TARTRATE 50 MG
12.5 TABLET ORAL DAILY
Qty: 0 | Refills: 0 | Status: DISCONTINUED | OUTPATIENT
Start: 2018-08-19 | End: 2018-08-20

## 2018-08-19 RX ORDER — LISINOPRIL 2.5 MG/1
2.5 TABLET ORAL DAILY
Qty: 0 | Refills: 0 | Status: DISCONTINUED | OUTPATIENT
Start: 2018-08-20 | End: 2018-08-20

## 2018-08-19 RX ADMIN — Medication 12.5 MILLIGRAM(S): at 12:17

## 2018-08-19 RX ADMIN — Medication 88 MICROGRAM(S): at 05:21

## 2018-08-19 RX ADMIN — ATORVASTATIN CALCIUM 80 MILLIGRAM(S): 80 TABLET, FILM COATED ORAL at 21:17

## 2018-08-19 RX ADMIN — CLOPIDOGREL BISULFATE 75 MILLIGRAM(S): 75 TABLET, FILM COATED ORAL at 12:17

## 2018-08-19 RX ADMIN — Medication 325 MILLIGRAM(S): at 12:17

## 2018-08-19 RX ADMIN — HEPARIN SODIUM 5000 UNIT(S): 5000 INJECTION INTRAVENOUS; SUBCUTANEOUS at 05:20

## 2018-08-19 RX ADMIN — Medication 5 MILLIGRAM(S): at 05:20

## 2018-08-19 RX ADMIN — Medication 1000 UNIT(S): at 12:17

## 2018-08-19 NOTE — PROGRESS NOTE ADULT - SUBJECTIVE AND OBJECTIVE BOX
CHIEF COMPLAINT: Patient is a 53y old  Male who presents with a chief complaint of Chest pain (16 Aug 2018 19:51)      HPI:  Pt is a 52yo M w PMH of HTN, HLD, hypothyroidsm, depression present to ED with resting chest pain. Pt states he has been having resting chest pain on and off for past 3-4 weeks. It is pressure like, mid-sternal 3/10, relieved by rest occures when walking and occasional radiation to left jaw. Today, pt was siting and noticed 4/10 mid chest pain pressure like associated with diaphoresis EMS give 4 Aspirine 81mg, 1 NTG, pain was slowly resolving. Pt denies of any similar symptoms. Denies of any SOB, palpitation, dizziness, fever, chills. Pt is concerned that he gained 15 lb for past 7 months. Denies of any suicidal ideation. No recent travel or sick contact. Pt was never a smoker. Father die of MI at age 60. Mother has stent placement at age 70.     At ED  Trop 0.054  s/p morphine relieved pain   EKG showed ST depression at Lead II, III, aVF    Case discussed with Dr. Gonzalez, will start Heparin drip, loading with Plavix (16 Aug 2018 19:51)    8/18-patient seen by Dr. Gonzalez and Dr Moseley was called in for AMI-stented Circ and RCA.  IABP placed due to hypotension and  severe MR noted.  Thought to be ischemic.  IABP removed today by Dr. Moseley and repeat ECHO with at least moderate MR.  Daig lesion still needs to be stented at future date.  Currently feeling better.      8/19-patient feeling well.  BP low post MI.  ECHO-shows at least moderate MR with inferior posterior akinesis.  Troponin peak 120 and CPK 4000's.  EF of 60% is a way overestimation of LV function.  As a result CHF treatment prioritized over beta blocker therapy. Now ambulating slowly with no symptoms    PMHx: PAST MEDICAL & SURGICAL HISTORY:  HLD (hyperlipidemia)  HTN (hypertension)  Depression  No significant past surgical history      Allergies: Allergies    No Known Allergies    Intolerances          REVIEW OF SYSTEMS:    CONSTITUTIONAL: No weakness, fevers or chills  EYES/ENT: No visual changes;  No vertigo or throat pain   NECK: No pain or stiffness  RESPIRATORY: No cough, wheezing, hemoptysis; No shortness of breath  CARDIOVASCULAR: No chest pain or palpitations  GASTROINTESTINAL: No abdominal or epigastric pain. No nausea, vomiting, or hematemesis; No diarrhea or constipation. No melena or hematochezia.  GENITOURINARY: No dysuria, frequency or hematuria  NEUROLOGICAL: No numbness or weakness  SKIN: No itching, burning, rashes, or lesions   All other review of systems is negative unless indicated above    Vital Signs Last 24 Hrs  T(C): 37.2 (19 Aug 2018 06:45), Max: 37.7 (19 Aug 2018 00:45)  T(F): 98.9 (19 Aug 2018 06:45), Max: 99.9 (19 Aug 2018 00:45)  HR: 73 (19 Aug 2018 06:00) (70 - 95)  BP: 98/67 (19 Aug 2018 06:00) (87/62 - 126/74)  BP(mean): 74 (19 Aug 2018 06:00) (65 - 84)  RR: 25 (19 Aug 2018 06:00) (20 - 34)  SpO2: 97% (19 Aug 2018 06:00) (88% - 99%)    I&O's Summary    18 Aug 2018 07:01  -  19 Aug 2018 07:00  --------------------------------------------------------  IN: 480 mL / OUT: 2125 mL / NET: -1645 mL            PHYSICAL EXAM:   Constitutional: NAD, awake and alert, well-developed  HEENT: PERR, EOMI, Normal Hearing, MMM  Neck: Soft and supple, No LAD, No JVD  Respiratory: Breath sounds are clear bilaterally, No wheezing, rales or rhonchi  Cardiovascular: S1 and S2, regular rate and rhythm, Murmurs  Gastrointestinal: Bowel Sounds present, soft, nontender, nondistended, no guarding, no rebound  Extremities: No peripheral edema  Vascular: 2+ peripheral pulses  Neurological: A/O x 3, no focal deficits  Musculoskeletal: 5/5 strength b/l upper and lower extremities  Skin: No rashes    MEDICATIONS:  MEDICATIONS  (STANDING):  aspirin 325 milliGRAM(s) Oral daily  atorvastatin 80 milliGRAM(s) Oral at bedtime  cholecalciferol 1000 Unit(s) Oral daily  clopidogrel Tablet 75 milliGRAM(s) Oral daily  enalapril 5 milliGRAM(s) Oral every 12 hours  escitalopram 10 milliGRAM(s) Oral daily  heparin  Injectable 5000 Unit(s) SubCutaneous every 8 hours  levothyroxine 88 MICROGram(s) Oral daily      LABS: All Labs Reviewed:                        13.8   9.91  )-----------( 173      ( 19 Aug 2018 05:29 )             40.4     08-19    138  |  103  |  18  ----------------------------<  97  3.6   |  27  |  0.99    Ca    8.8      19 Aug 2018 05:29        CARDIAC MARKERS ( 19 Aug 2018 05:29 )  23.900 ng/mL / x     / x     / x     / x      CARDIAC MARKERS ( 17 Aug 2018 17:47 )  97.000 ng/mL / x     / x     / x     / x      CARDIAC MARKERS ( 17 Aug 2018 09:48 )  129.000 ng/mL / x     / 4605 U/L / x     / x          Blood Culture:     lipid profile          08-17 @ 03:45  cholesterol 140 mg/dL  direct LDL 89 mg/dL  HDL 28 mg/dL  HDL/total cholesterol --  Triglyceride, serum 115 mg/dL    BNP     RADIOLOGY:    EKG:      Telemetry:    ECHO:

## 2018-08-19 NOTE — PROGRESS NOTE ADULT - ASSESSMENT
patient with AMI and CHF with severe MR.  Patient underwent cath/stent with IABP.  Repeat ECHO with improved MR but significant inferior/posterior akinesis with EF about 45%  1-CAD-ASA/plavix and lipitor.  Further intervention will be in future as outpatient  2-CHF-EF 45% on ACE-I and lasix.  BP running in the 90's  IABP removed yesterday.  Would benefit from spironolactone once BP is stabilized  3-MR-mildly better-no flail seen and suspected ischemic MR.  Would hold off on CHARLIE at this time.  IF patient has any complications or poor progressive improvement, would reconsider CHARLIE

## 2018-08-19 NOTE — PROGRESS NOTE ADULT - SUBJECTIVE AND OBJECTIVE BOX
52 yo male admitted for ACS. ER triage at 1604. Seen by hospitalist and admitted to cardiac bed. At 2130 PM developed dizziness, hypotension, diaphoresis. EKG showing ST depressions in the inferior leads. No ST elevations noted. No suggestion of RV infarct on right sided EKG leads. Echo shows basal hypokinesis. Moderate to severe MR, moderate TR, moderate PAH. BP better with IV neosynepherine. PMH of HTN, HLD, hypothyroidsm, depression. Family history positive for early onset CAD. S/P LIONEL of the proximal RCA and the proximal LCx. IABP removed, pt remains hemodynamically stable.     Today,  no new complaints. Feels fine. BP stable.    PAST MEDICAL & SURGICAL HISTORY:  HLD (hyperlipidemia)  HTN (hypertension)  Hypothyroidism  Depression  Sleep Apnea  No significant past surgical history   Cardiac cath 6 years ago negative per pt.    CURRENT CARDIAC WORKUP:       Echo:  Repear. Basal inferolateral hypokinesis. 2+ MR.    Cardiac Cath:  3V CAD. s/p LIONEL PCI of the RCA, prox LCx. Medical management of the LAD and D2 for now.    Allergies:   No Known Allergies      MEDICATIONS  (STANDING):  aspirin 325 milliGRAM(s) Oral daily  atorvastatin 80 milliGRAM(s) Oral at bedtime  cholecalciferol 1000 Unit(s) Oral daily  clopidogrel Tablet 75 milliGRAM(s) Oral daily  enalapril 5 milliGRAM(s) Oral every 12 hours  escitalopram 10 milliGRAM(s) Oral daily  heparin  Injectable 5000 Unit(s) SubCutaneous every 8 hours  levothyroxine 88 MICROGram(s) Oral daily    MEDICATIONS  (PRN):  acetaminophen   Tablet 650 milliGRAM(s) Oral every 6 hours PRN For Temp greater than 38 C (100.4 F)  ondansetron Injectable 4 milliGRAM(s) IV Push every 6 hours PRN Nausea      ROS:     Detailed ten system ROS was performed and was negative except for history as eluded to above.    no fever  no chills  no nausea  no vomiting  no diarrhea  no constipation  no melena  no hematochezia  no chest pain  no palpitations  no sob at rest  no dyspnea on exertion  no cough  no wheezing  no anorexia  no headache  no dizziness  no syncope  no weakness  no myalgia  no dysuria  no polyuria  no hematuria       Vital Signs Last 24 Hrs  T(C): 37.2 (19 Aug 2018 06:45), Max: 37.7 (19 Aug 2018 00:45)  T(F): 98.9 (19 Aug 2018 06:45), Max: 99.9 (19 Aug 2018 00:45)  HR: 73 (19 Aug 2018 06:00) (70 - 95)  BP: 98/67 (19 Aug 2018 06:00) (87/62 - 126/74)  BP(mean): 74 (19 Aug 2018 06:00) (65 - 84)  RR: 25 (19 Aug 2018 06:00) (20 - 34)  SpO2: 97% (19 Aug 2018 06:00) (88% - 99%)    I&O's Summary    18 Aug 2018 07:01  -  19 Aug 2018 07:00  --------------------------------------------------------  IN: 480 mL / OUT: 2125 mL / NET: -1645 mL        PHYSICAL EXAM:    General:                Comfortable, AAO X 3, in no distress.   HEENT:                  Atraumatic, PERRLA, EOMI, conjunctiva clear.   Neck:                     Supple, no adenopathy, no thyromegaly, no JVD, no bruit.  Back:                     Symmetric, non tender.  Chest:                    Clear, B/L symmetric air entry, no tachypnea  Heart:                     S1, S2 normal, no gallop, + murmur.  Abdomen:              Soft, non-tender, bowel sounds active. No palpable masses.  Extremities:           no cyanosis, no edema. Peripheral pulses normal. No hematoma  Skin:                      Skin color, texture normal. No rashes.  Neurologic:            Grossly nonfocal.       TELEMETRY:  Normal sinus rhythm with no tachy or rory events     ECG:  NSR, PAC, Non specific ST T changes    LABS:                        13.8   9.91  )-----------( 173      ( 19 Aug 2018 05:29 )             40.4     08-19    138  |  103  |  18  ----------------------------<  97  3.6   |  27  |  0.99    Ca    8.8      19 Aug 2018 05:29        Lipid Panel  Chl 140  HDL 28  LDL 89  Trg 115        08-19 @ 05:29  Trop-I 23.900    08-17 @ 17:47  Trop-I 97.000    08-17 @ 09:48  Trop-I 129.000  CK     4605    08-16 @ 22:01  Trop-I 1.400    08-16 @ 19:54  Trop-I 0.355    08-16 @ 16:15  Trop-I 0.054        RADIOLOGY & ADDITIONAL STUDIES:    Xray Chest 1 View- PORTABLE-Routine (08.18.18 @ 10:23) >  FINDINGS:  AP radiograph of the chest demonstrates no evidence of infiltrate, pleural effusion or vascular congestion. No atelectasis is seen. The cardiac silhouette is normal in size. Osseous structures are intact. 54 yo male admitted for ACS. ER triage at 1604. Seen by hospitalist and admitted to cardiac bed. At 2130 PM developed dizziness, hypotension, diaphoresis. EKG showing ST depressions in the inferior leads. No ST elevations noted. No suggestion of RV infarct on right sided EKG leads. Echo shows basal hypokinesis. Moderate to severe MR, moderate TR, moderate PAH. BP better with IV neosynepherine. PMH of HTN, HLD, hypothyroidsm, depression. Family history positive for early onset CAD. S/P LIONEL of the proximal RCA and the proximal LCx. IABP removed, pt remains hemodynamically stable.     Today,  no new complaints. Feels fine. BP stable. Is out of bed. BP remains low.    PAST MEDICAL & SURGICAL HISTORY:  HLD (hyperlipidemia)  HTN (hypertension)  Hypothyroidism  Depression  Sleep Apnea  No significant past surgical history   Cardiac cath 6 years ago negative per pt.    CURRENT CARDIAC WORKUP:       Echo:  Repear. Basal inferolateral hypokinesis. 2+ MR.    Cardiac Cath:  3V CAD. s/p LIONEL PCI of the RCA, prox LCx. Medical management of the LAD and D2 for now.    Allergies:   No Known Allergies      MEDICATIONS  (STANDING):  aspirin 325 milliGRAM(s) Oral daily  atorvastatin 80 milliGRAM(s) Oral at bedtime  cholecalciferol 1000 Unit(s) Oral daily  clopidogrel Tablet 75 milliGRAM(s) Oral daily  enalapril 5 milliGRAM(s) Oral every 12 hours  escitalopram 10 milliGRAM(s) Oral daily  heparin  Injectable 5000 Unit(s) SubCutaneous every 8 hours  levothyroxine 88 MICROGram(s) Oral daily    MEDICATIONS  (PRN):  acetaminophen   Tablet 650 milliGRAM(s) Oral every 6 hours PRN For Temp greater than 38 C (100.4 F)  ondansetron Injectable 4 milliGRAM(s) IV Push every 6 hours PRN Nausea      ROS:     Detailed ten system ROS was performed and was negative except for history as eluded to above.    no fever  no chills  no nausea  no vomiting  no diarrhea  no constipation  no melena  no hematochezia  no chest pain  no palpitations  no sob at rest  no dyspnea on exertion  no cough  no wheezing  no anorexia  no headache  no dizziness  no syncope  no weakness  no myalgia  no dysuria  no polyuria  no hematuria       Vital Signs Last 24 Hrs  T(C): 37.2 (19 Aug 2018 06:45), Max: 37.7 (19 Aug 2018 00:45)  T(F): 98.9 (19 Aug 2018 06:45), Max: 99.9 (19 Aug 2018 00:45)  HR: 73 (19 Aug 2018 06:00) (70 - 95)  BP: 98/67 (19 Aug 2018 06:00) (87/62 - 126/74)  BP(mean): 74 (19 Aug 2018 06:00) (65 - 84)  RR: 25 (19 Aug 2018 06:00) (20 - 34)  SpO2: 97% (19 Aug 2018 06:00) (88% - 99%)    I&O's Summary    18 Aug 2018 07:01  -  19 Aug 2018 07:00  --------------------------------------------------------  IN: 480 mL / OUT: 2125 mL / NET: -1645 mL        PHYSICAL EXAM:    General:                Comfortable, AAO X 3, in no distress.   HEENT:                  Atraumatic, PERRLA, EOMI, conjunctiva clear.   Neck:                     Supple, no adenopathy, no thyromegaly, no JVD, no bruit.  Back:                     Symmetric, non tender.  Chest:                    Clear, B/L symmetric air entry, no tachypnea  Heart:                     S1, S2 normal, no gallop, + murmur.  Abdomen:              Soft, non-tender, bowel sounds active. No palpable masses.  Extremities:           no cyanosis, no edema. Peripheral pulses normal. No hematoma  Skin:                      Skin color, texture normal. No rashes.  Neurologic:            Grossly nonfocal.       TELEMETRY:  Normal sinus rhythm with no tachy or rory events     ECG:  NSR, PAC, Non specific ST T changes    LABS:                        13.8   9.91  )-----------( 173      ( 19 Aug 2018 05:29 )             40.4     08-19    138  |  103  |  18  ----------------------------<  97  3.6   |  27  |  0.99    Ca    8.8      19 Aug 2018 05:29        Lipid Panel  Chl 140  HDL 28  LDL 89  Trg 115        08-19 @ 05:29  Trop-I 23.900    08-17 @ 17:47  Trop-I 97.000    08-17 @ 09:48  Trop-I 129.000  CK     4605    08-16 @ 22:01  Trop-I 1.400    08-16 @ 19:54  Trop-I 0.355    08-16 @ 16:15  Trop-I 0.054      RADIOLOGY & ADDITIONAL STUDIES:    Xray Chest 1 View- PORTABLE-Routine (08.18.18 @ 10:23) >  FINDINGS:  AP radiograph of the chest demonstrates no evidence of infiltrate, pleural effusion or vascular congestion. No atelectasis is seen. The cardiac silhouette is normal in size. Osseous structures are intact.

## 2018-08-19 NOTE — PROGRESS NOTE ADULT - ASSESSMENT
A/P    #NSTEMI s/p cath and stent placement   -need another cath and intervention as an outpt   -meanwhile ct same rx     #Developed hypotension and requires IABP  and phenyephrine s/p removal   -now stable vital and tolerating bp and cardiac meds     #monitor for today and if stable d/c tomorrow     #Above discussed with pt and all questions have been answered

## 2018-08-19 NOTE — PROGRESS NOTE ADULT - SUBJECTIVE AND OBJECTIVE BOX
HPI:  Pt is a 54yo M w PMH of HTN, HLD, hypothyroidsm, depression present to ED with resting chest pain. Pt states he has been having resting chest pain on and off for past 3-4 weeks. It is pressure like, mid-sternal 3/10, relieved by rest occures when walking and occasional radiation to left jaw. On the day of admission  pt was siting and noticed 4/10 mid chest pain pressure like associated with diaphoresis EMS give 4 Aspirine 81mg, 1 NTG, pain was slowly resolving. Pt denies of any similar symptoms. Denies of any SOB, palpitation, dizziness, fever, chills. At         #Review of system- rest of review of systems are negative except as mentioned in HPI    PHYSICAL EXAM:    Vital Signs Last 24 Hrs  T(C): 35.9 (19 Aug 2018 11:00), Max: 37.7 (19 Aug 2018 00:45)  T(F): 96.7 (19 Aug 2018 11:00), Max: 99.9 (19 Aug 2018 00:45)  HR: 91 (19 Aug 2018 12:00) (73 - 95)  BP: 102/67 (19 Aug 2018 12:00) (93/64 - 126/74)  BP(mean): 79 (19 Aug 2018 11:00) (65 - 84)  RR: 20 (19 Aug 2018 12:00) (18 - 34)  SpO2: 96% (19 Aug 2018 12:00) (88% - 99%)    GENERAL: comfortable   HEAD:  Atraumatic, Normocephalic  EYES: EOMI, PERRLA, conjunctiva and sclera clear  HEENT: Moist mucous membranes  NECK: Supple, No JVD  NERVOUS SYSTEM:  Alert & Oriented X3, Motor Strength 5/5 B/L upper and lower extremities; DTRs 2+ intact and symmetric  CHEST/LUNG: Clear to auscultation bilaterally; No rales, rhonchi, wheezing, or rubs  HEART:S1S2 normal, no murmer  ABDOMEN: Soft, Nontender, Nondistended; Bowel sounds present  GENITOURINARY- Voiding, no palpable bladder  EXTREMITIES:  2+ Peripheral Pulses, No clubbing, cyanosis, or edema  MUSCULOSKELTAL- No muscle tenderness, Muscle tone normal, No joint tenderness, no Joint swelling, Joint range of motion-normal  SKIN-no rash, no lesion  PSYCH- Mood stable  LYMPH Node- No palpable lymph node    LABS:                        13.8   9.91  )-----------( 173      ( 19 Aug 2018 05:29 )             40.4     08-19    138  |  103  |  18  ----------------------------<  97  3.6   |  27  |  0.99    Ca    8.8      19 Aug 2018 05:29            CAPILLARY BLOOD GLUCOSE          CARDIAC MARKERS ( 19 Aug 2018 05:29 )  23.900 ng/mL / x     / x     / x     / x      CARDIAC MARKERS ( 17 Aug 2018 17:47 )  97.000 ng/mL / x     / x     / x     / x            Standing medicine  acetaminophen   Tablet 650 milliGRAM(s) Oral every 6 hours PRN  aspirin 325 milliGRAM(s) Oral daily  atorvastatin 80 milliGRAM(s) Oral at bedtime  cholecalciferol 1000 Unit(s) Oral daily  clopidogrel Tablet 75 milliGRAM(s) Oral daily  escitalopram 10 milliGRAM(s) Oral daily  heparin  Injectable 5000 Unit(s) SubCutaneous every 8 hours  levothyroxine 88 MICROGram(s) Oral daily  metoprolol succinate ER 12.5 milliGRAM(s) Oral daily  ondansetron Injectable 4 milliGRAM(s) IV Push every 6 hours PRN

## 2018-08-19 NOTE — PROGRESS NOTE ADULT - ASSESSMENT
NSTEMI  CAD. s/p LIONEL of the prox RCA, prox LCx. Medical management of prox LAD 60-70% and D2 90%.  Pulmonary edema, improved  Moderate MR, possibly sec to ischemia/ infarct. improved  HLD  Fever , possibly post MI    Suggest:    DAPT  Diuresis as tolerated by BP.  ACEi and beta blockers as BP permits  Statins  Ambulate  Tylenol for fever. Most likely post MI. Will follow. Blood cultures can be sent.   LAD, D2 PCI as out pt unless ischemic. NSTEMI  CAD. s/p LIONEL of the prox RCA, prox LCx. Medical management of prox LAD 60-70% and D2 90%.  Pulmonary edema, improved  Moderate MR, possibly sec to ischemia/ infarct. improved  HLD  Fever , possibly post MI    Suggest:    DAPT  lower ACEi dose.   Resume low dose beta blockers and observe BP  Statins  Ambulate  Tylenol for fever. Most likely post MI. Will follow. Blood cultures can be sent.   LAD, D2 PCI as out pt unless ischemic.  D/W friend.

## 2018-08-20 ENCOUNTER — TRANSCRIPTION ENCOUNTER (OUTPATIENT)
Age: 53
End: 2018-08-20

## 2018-08-20 VITALS — OXYGEN SATURATION: 95 % | HEART RATE: 75 BPM | SYSTOLIC BLOOD PRESSURE: 100 MMHG | DIASTOLIC BLOOD PRESSURE: 68 MMHG

## 2018-08-20 LAB
HCT VFR BLD CALC: 39.4 % — SIGNIFICANT CHANGE UP (ref 39–50)
HGB BLD-MCNC: 13.8 G/DL — SIGNIFICANT CHANGE UP (ref 13–17)
MCHC RBC-ENTMCNC: 31.7 PG — SIGNIFICANT CHANGE UP (ref 27–34)
MCHC RBC-ENTMCNC: 35 GM/DL — SIGNIFICANT CHANGE UP (ref 32–36)
MCV RBC AUTO: 90.4 FL — SIGNIFICANT CHANGE UP (ref 80–100)
NRBC # BLD: 0 /100 WBCS — SIGNIFICANT CHANGE UP (ref 0–0)
PLATELET # BLD AUTO: 195 K/UL — SIGNIFICANT CHANGE UP (ref 150–400)
RBC # BLD: 4.36 M/UL — SIGNIFICANT CHANGE UP (ref 4.2–5.8)
RBC # FLD: 12.8 % — SIGNIFICANT CHANGE UP (ref 10.3–14.5)
WBC # BLD: 7.93 K/UL — SIGNIFICANT CHANGE UP (ref 3.8–10.5)
WBC # FLD AUTO: 7.93 K/UL — SIGNIFICANT CHANGE UP (ref 3.8–10.5)

## 2018-08-20 RX ORDER — CLOPIDOGREL BISULFATE 75 MG/1
1 TABLET, FILM COATED ORAL
Qty: 30 | Refills: 0 | OUTPATIENT
Start: 2018-08-20 | End: 2018-09-18

## 2018-08-20 RX ORDER — ROSUVASTATIN CALCIUM 5 MG/1
1 TABLET ORAL
Qty: 0 | Refills: 0 | COMMUNITY

## 2018-08-20 RX ORDER — ATORVASTATIN CALCIUM 80 MG/1
1 TABLET, FILM COATED ORAL
Qty: 30 | Refills: 0 | OUTPATIENT
Start: 2018-08-20 | End: 2018-09-18

## 2018-08-20 RX ORDER — ASPIRIN/CALCIUM CARB/MAGNESIUM 324 MG
1 TABLET ORAL
Qty: 30 | Refills: 0 | OUTPATIENT
Start: 2018-08-20 | End: 2018-09-18

## 2018-08-20 RX ORDER — LISINOPRIL 2.5 MG/1
1 TABLET ORAL
Qty: 30 | Refills: 0 | OUTPATIENT
Start: 2018-08-20 | End: 2018-09-18

## 2018-08-20 RX ORDER — ASPIRIN/CALCIUM CARB/MAGNESIUM 324 MG
1 TABLET ORAL
Qty: 0 | Refills: 0 | COMMUNITY

## 2018-08-20 RX ADMIN — CLOPIDOGREL BISULFATE 75 MILLIGRAM(S): 75 TABLET, FILM COATED ORAL at 09:08

## 2018-08-20 RX ADMIN — LISINOPRIL 2.5 MILLIGRAM(S): 2.5 TABLET ORAL at 06:58

## 2018-08-20 RX ADMIN — Medication 325 MILLIGRAM(S): at 09:08

## 2018-08-20 RX ADMIN — Medication 88 MICROGRAM(S): at 06:58

## 2018-08-20 RX ADMIN — Medication 12.5 MILLIGRAM(S): at 06:58

## 2018-08-20 RX ADMIN — Medication 1000 UNIT(S): at 09:08

## 2018-08-20 NOTE — PROGRESS NOTE ADULT - ASSESSMENT
patient with AMI and CHF with severe MR.  Patient underwent cath/stent with IABP.  Repeat ECHO with improved MR but significant inferior/posterior akinesis with EF about 45%  1-CAD-ASA/plavix and lipitor.  Further intervention will be in future as outpatient- will likely n eed stress as out patient   2-CHF-EF 45% on ACE-I and lasix.  BP running in the 90's  IABP removed yesterday.  Would benefit from spironolactone once BP is stabilized  3-MR-mildly better-no flail seen and suspected ischemic MR.    no cardaic contraindication to DC= follow up later this week

## 2018-08-20 NOTE — DISCHARGE NOTE ADULT - MEDICATION SUMMARY - MEDICATIONS TO TAKE
I will START or STAY ON the medications listed below when I get home from the hospital:    aspirin 325 mg oral tablet  -- 1 tab(s) by mouth once a day  -- Indication: For MI    lisinopril 2.5 mg oral tablet  -- 1 tab(s) by mouth once a day  -- Indication: For MI    Lexapro 10 mg oral tablet  -- 1 tab(s) by mouth once a day  -- Indication: For Home meds    atorvastatin 80 mg oral tablet  -- 1 tab(s) by mouth once a day (at bedtime)  -- Indication: For MI    clopidogrel 75 mg oral tablet  -- 1 tab(s) by mouth once a day  -- Indication: For MI    metoprolol tartrate 25 mg oral tablet  -- 1 tab(s) by mouth once a day  -- Indication: For MI/CAD    Synthroid 88 mcg (0.088 mg) oral tablet  -- 1 tab(s) by mouth once a day  -- Indication: For Home meds    cholecalciferol 1000 intl units oral tablet  -- 1 tab(s) by mouth once a day  -- Indication: For Home meds

## 2018-08-20 NOTE — DISCHARGE NOTE ADULT - CARE PROVIDER_API CALL
Zachary Moseley), Cardiology; Interventional Cardiology  [address]  [place] [address]  Phone: [phone]  Fax: [phone]

## 2018-08-20 NOTE — DISCHARGE NOTE ADULT - PATIENT PORTAL LINK FT
You can access the [facility] Patient Portal, offered by [facility], by registering with the following website: [email or web address]

## 2018-08-20 NOTE — PROGRESS NOTE ADULT - PROVIDER SPECIALTY LIST ADULT
Cardiology
Critical Care
Hospitalist
Intervent Cardiology
Critical Care

## 2018-08-20 NOTE — DISCHARGE NOTE ADULT - MEDICATION SUMMARY - MEDICATIONS TO STOP TAKING
I will STOP taking the medications listed below when I get home from the hospital:    Crestor 40 mg oral tablet  -- 1 tab(s) by mouth once a day    enalapril 10 mg oral tablet  -- 1 tab(s) by mouth once a day

## 2018-08-20 NOTE — PROGRESS NOTE ADULT - SUBJECTIVE AND OBJECTIVE BOX
CHIEF COMPLAINT: Patient is a 53y old  Male who presents with a chief complaint of Chest pain (16 Aug 2018 19:51)      HPI:  Pt is a 54yo M w PMH of HTN, HLD, hypothyroidsm, depression present to ED with resting chest pain. Pt states he has been having resting chest pain on and off for past 3-4 weeks. It is pressure like, mid-sternal 3/10, relieved by rest occures when walking and occasional radiation to left jaw. Today, pt was siting and noticed 4/10 mid chest pain pressure like associated with diaphoresis EMS give 4 Aspirine 81mg, 1 NTG, pain was slowly resolving. Pt denies of any similar symptoms. Denies of any SOB, palpitation, dizziness, fever, chills. Pt is concerned that he gained 15 lb for past 7 months. Denies of any suicidal ideation. No recent travel or sick contact. Pt was never a smoker. Father die of MI at age 60. Mother has stent placement at age 70.     8/19-patient feeling well.  BP low post MI.  ECHO-shows at least moderate MR with inferior posterior akinesis.  Troponin peak 120 and CPK 4000's.  EF of 60% is a way overestimation of LV function.  As a result CHF treatment prioritized over beta blocker therapy. Now ambulating slowly with no symptoms    PMHx: PAST MEDICAL & SURGICAL HISTORY:  HLD (hyperlipidemia)  HTN (hypertension)  Depression  No significant past surgical history      Allergies: Allergies    No Known Allergies    Intolerances          REVIEW OF SYSTEMS:    CONSTITUTIONAL: No weakness, fevers or chills  EYES/ENT: No visual changes;  No vertigo or throat pain   NECK: No pain or stiffness  RESPIRATORY: No cough, wheezing, hemoptysis; No shortness of breath  CARDIOVASCULAR: No chest pain or palpitations  GASTROINTESTINAL: No abdominal or epigastric pain. No nausea, vomiting, or hematemesis; No diarrhea or constipation. No melena or hematochezia.  GENITOURINARY: No dysuria, frequency or hematuria  NEUROLOGICAL: No numbness or weakness  SKIN: No itching, burning, rashes, or lesions   All other review of systems is negative unless indicated above    Vital Signs Last 24 Hrs  T(C): 37.2 (19 Aug 2018 06:45), Max: 37.7 (19 Aug 2018 00:45)  T(F): 98.9 (19 Aug 2018 06:45), Max: 99.9 (19 Aug 2018 00:45)  HR: 73 (19 Aug 2018 06:00) (70 - 95)  BP: 98/67 (19 Aug 2018 06:00) (87/62 - 126/74)  BP(mean): 74 (19 Aug 2018 06:00) (65 - 84)  RR: 25 (19 Aug 2018 06:00) (20 - 34)  SpO2: 97% (19 Aug 2018 06:00) (88% - 99%)    I&O's Summary    18 Aug 2018 07:01  -  19 Aug 2018 07:00  --------------------------------------------------------  IN: 480 mL / OUT: 2125 mL / NET: -1645 mL            PHYSICAL EXAM:   Constitutional: NAD, awake and alert, well-developed  HEENT: PERR, EOMI, Normal Hearing, MMM  Neck: Soft and supple, No LAD, No JVD  Respiratory: Breath sounds are clear bilaterally, No wheezing, rales or rhonchi  Cardiovascular: S1 and S2, regular rate and rhythm, Murmurs  Gastrointestinal: Bowel Sounds present, soft, nontender, nondistended, no guarding, no rebound  Extremities: No peripheral edema  Vascular: 2+ peripheral pulses  Neurological: A/O x 3, no focal deficits  Musculoskeletal: 5/5 strength b/l upper and lower extremities  Skin: No rashes    MEDICATIONS  (STANDING):  aspirin 325 milliGRAM(s) Oral daily  atorvastatin 80 milliGRAM(s) Oral at bedtime  cholecalciferol 1000 Unit(s) Oral daily  clopidogrel Tablet 75 milliGRAM(s) Oral daily  escitalopram 10 milliGRAM(s) Oral daily  heparin  Injectable 5000 Unit(s) SubCutaneous every 8 hours  levothyroxine 88 MICROGram(s) Oral daily  lisinopril 2.5 milliGRAM(s) Oral daily  metoprolol succinate ER 12.5 milliGRAM(s) Oral daily      LABS: All Labs Reviewed:                        13.8   7.93  )-----------( 195      ( 20 Aug 2018 04:46 )             39.4   08-19    138  |  103  |  18  ----------------------------<  97  3.6   |  27  |  0.99    Ca    8.8      19 Aug 2018 05:29            CARDIAC MARKERS ( 19 Aug 2018 05:29 )  23.900 ng/mL / x     / x     / x     / x      CARDIAC MARKERS ( 17 Aug 2018 17:47 )  97.000 ng/mL / x     / x     / x     / x      CARDIAC MARKERS ( 17 Aug 2018 09:48 )  129.000 ng/mL / x     / 4605 U/L / x     / x          Blood Culture:     lipid profile          08-17 @ 03:45  cholesterol 140 mg/dL  direct LDL 89 mg/dL  HDL 28 mg/dL  HDL/total cholesterol --  Triglyceride, serum 115 mg/dL        Telemetry: SR. occasional PVC    ECHO:  < from: Transthoracic Echocardiogram Follow Up (08.18.18 @ 07:50) >   Summary     Basal inferolateral hypokinesis. Estimated left ventricular ejection   fraction is 55 %.   Moderate (2+) mitral regurgitation is present. No flail leaflet or   ruptured papillary muscle.   Trace pericardial effusion is present.     Signature     ----------------------------------------------------------------   Electronically signed by Zachary Moseley MD(Interpreting   physician) on 08/19/2018 10:02 AM   ----------------------------------------------------------------    < end of copied text >

## 2018-08-20 NOTE — CHART NOTE - NSCHARTNOTEFT_GEN_A_CORE
pt has been advised to take metoprolol 12.5 mg a day till he sees DR. Moseley as an outpt for further recommendation. I discussed this with pt

## 2018-08-20 NOTE — DISCHARGE NOTE ADULT - CARE PLAN
Principal Discharge DX:	NSTEMI (non-ST elevated myocardial infarction)  Goal:	take your medicine, follow up in cardiology office for ongoing management  Assessment and plan of treatment:	as above

## 2018-08-20 NOTE — PROGRESS NOTE ADULT - SUBJECTIVE AND OBJECTIVE BOX
54 yo male admitted for ACS. ER triage at 1604. Seen by hospitalist and admitted to cardiac bed. At 2130 PM developed dizziness, hypotension, diaphoresis. EKG showing ST depressions in the inferior leads. No ST elevations noted. No suggestion of RV infarct on right sided EKG leads. Echo shows basal hypokinesis. Moderate to severe MR, moderate TR, moderate PAH. BP better with IV neosynepherine. PMH of HTN, HLD, hypothyroidsm, depression. Family history positive for early onset CAD. S/P LIONEL of the proximal RCA and the proximal LCx. IABP removed, pt remains hemodynamically stable.     Today,  no new complaints. Feels fine. BP stable. Is out of bed. BP remains low. Brother and mother at bedside.    PAST MEDICAL & SURGICAL HISTORY:  HLD (hyperlipidemia)  HTN (hypertension)  Hypothyroidism  Depression  Sleep Apnea  No significant past surgical history   Cardiac cath 6 years ago negative per pt.    CURRENT CARDIAC WORKUP:       Echo:  Repear. Basal inferolateral hypokinesis. 2+ MR.    Cardiac Cath:  3V CAD. s/p LIONEL PCI of the RCA, prox LCx. Medical management of the LAD and D2 for now.    Allergies:   No Known Allergies      MEDICATIONS  (STANDING):  aspirin 325 milliGRAM(s) Oral daily  atorvastatin 80 milliGRAM(s) Oral at bedtime  cholecalciferol 1000 Unit(s) Oral daily  clopidogrel Tablet 75 milliGRAM(s) Oral daily  escitalopram 10 milliGRAM(s) Oral daily  heparin  Injectable 5000 Unit(s) SubCutaneous every 8 hours  levothyroxine 88 MICROGram(s) Oral daily  lisinopril 2.5 milliGRAM(s) Oral daily  metoprolol succinate ER 12.5 milliGRAM(s) Oral daily    MEDICATIONS  (PRN):  acetaminophen   Tablet 650 milliGRAM(s) Oral every 6 hours PRN For Temp greater than 38 C (100.4 F)  ondansetron Injectable 4 milliGRAM(s) IV Push every 6 hours PRN Nausea      ROS:     Detailed ten system ROS was performed and was negative except for history as eluded to above.    no fever  no chills  no nausea  no vomiting  no diarrhea  no constipation  no melena  no hematochezia  no chest pain  no palpitations  no sob at rest  no dyspnea on exertion  no cough  no wheezing  no anorexia  no headache  no dizziness  no syncope  no weakness  no myalgia  no dysuria  no polyuria  no hematuria      Vital Signs Last 24 Hrs  T(C): 37 (20 Aug 2018 04:28), Max: 37.3 (20 Aug 2018 00:03)  T(F): 98.6 (20 Aug 2018 04:28), Max: 99.1 (20 Aug 2018 00:03)  HR: 66 (20 Aug 2018 06:00) (66 - 91)  BP: 108/65 (20 Aug 2018 06:00) (90/61 - 111/80)  BP(mean): 74 (20 Aug 2018 06:00) (48 - 87)  RR: 20 (19 Aug 2018 12:00) (18 - 20)  SpO2: 96% (19 Aug 2018 14:00) (95% - 96%)    I&O's Summary    19 Aug 2018 07:01  -  20 Aug 2018 07:00  --------------------------------------------------------  IN: 944 mL / OUT: 1000 mL / NET: -56 mL        PHYSICAL EXAM:    General:                Comfortable, AAO X 3, in no distress.   HEENT:                  Atraumatic, PERRLA, EOMI, conjunctiva clear.   Neck:                     Supple, no adenopathy, no thyromegaly, no JVD, no bruit.  Back:                     Symmetric, non tender.  Chest:                    Clear, B/L symmetric air entry, no tachypnea  Heart:                     S1, S2 normal, no gallop, + murmur.  Abdomen:              Soft, non-tender, bowel sounds active. No palpable masses.  Extremities:           no cyanosis, no edema. Peripheral pulses normal. No hematoma  Skin:                      Skin color, texture normal. No rashes.  Neurologic:            Grossly nonfocal.       TELEMETRY:  Normal sinus rhythm with no tachy or rory events     ECG:  NSR, PAC, Non specific ST T changes        LABS:                        13.8   7.93  )-----------( 195      ( 20 Aug 2018 04:46 )             39.4     08-19    138  |  103  |  18  ----------------------------<  97  3.6   |  27  |  0.99    Ca    8.8      19 Aug 2018 05:29        Lipid Panel  Chl 140  HDL 28  LDL 89  Trg 115      08-19 @ 05:29  Trop-I 23.900    08-17 @ 17:47  Trop-I 97.000    08-17 @ 09:48  Trop-I 129.000  CK     4605    08-16 @ 22:01  Trop-I 1.400    08-16 @ 19:54  Trop-I 0.355    08-16 @ 16:15  Trop-I 0.054        RADIOLOGY & ADDITIONAL STUDIES:    Xray Chest 1 View- PORTABLE-Routine (08.18.18 @ 10:23) >  FINDINGS:  AP radiograph of the chest demonstrates no evidence of infiltrate, pleural effusion or vascular congestion. No atelectasis is seen. The cardiac silhouette is normal in size. Osseous structures are intact.

## 2018-08-20 NOTE — DISCHARGE NOTE ADULT - HOSPITAL COURSE
I:  Pt is a 52yo M w PMH of HTN, HLD, hypothyroidsm, depression present to ED with resting chest pain. Pt states he has been having resting chest pain on and off for past 3-4 weeks. It is pressure like, mid-sternal 3/10, relieved by rest occures when walking and occasional radiation to left jaw. On the day of admission  pt was siting and noticed 4/10 mid chest pain pressure like associated with diaphoresis EMS give 4 Aspirine 81mg, 1 NTG, pain was slowly resolving. Pt denies of any similar symptoms. Denies of any SOB, palpitation, dizziness, fever, chills. At         #Review of system- rest of review of systems are negative except as mentioned in HPI    PHYSICAL EXAM:  Vital Signs Last 24 Hrs  T(C): 36.2 (20 Aug 2018 09:00), Max: 37.3 (20 Aug 2018 00:03)  T(F): 97.1 (20 Aug 2018 09:00), Max: 99.1 (20 Aug 2018 00:03)  HR: 83 (20 Aug 2018 10:00) (66 - 91)  BP: 104/70 (20 Aug 2018 10:00) (90/61 - 119/82)  BP(mean): 78 (20 Aug 2018 09:00) (48 - 92)  RR: 16 (20 Aug 2018 09:00) (16 - 20)  SpO2: 98% (20 Aug 2018 09:00) (95% - 98%)    GENERAL: comfortable   HEAD:  Atraumatic, Normocephalic  EYES: EOMI, PERRLA, conjunctiva and sclera clear  HEENT: Moist mucous membranes  NECK: Supple, No JVD  NERVOUS SYSTEM:  Alert & Oriented X3, Motor Strength 5/5 B/L upper and lower extremities; DTRs 2+ intact and symmetric  CHEST/LUNG: Clear to auscultation bilaterally; No rales, rhonchi, wheezing, or rubs  HEART:S1S2 normal, no murmer  ABDOMEN: Soft, Nontender, Nondistended; Bowel sounds present  GENITOURINARY- Voiding, no palpable bladder  EXTREMITIES:  2+ Peripheral Pulses, No clubbing, cyanosis, or edema  MUSCULOSKELTAL- No muscle tenderness, Muscle tone normal, No joint tenderness, no Joint swelling, Joint range of motion-normal  SKIN-no rash, no lesion  PSYCH- Mood stable  LYMPH Node- No palpable lymph node                          13.8   7.93  )-----------( 195      ( 20 Aug 2018 04:46 )             39.4   08-19    138  |  103  |  18  ----------------------------<  97  3.6   |  27  |  0.99    Ca    8.8      19 Aug 2018 05:29      A/P    #NSTEMI s/p cath and stent placement   -need another cath and intervention as an outpt   -stable on current regimen and is being discharged with further management as an outpt     #Developed hypotension and requires IABP  and phenylephrine s/p removal     #d/c today, time spent 65 minutes

## 2018-08-20 NOTE — PROGRESS NOTE ADULT - ASSESSMENT
NSTEMI  CAD. s/p LIONEL of the prox RCA, prox LCx. Medical management of prox LAD 60-70% and D2 90%.  Pulmonary edema, improved  Moderate MR, possibly sec to ischemia/ infarct. improved  HLD  Fever , possibly post MI    Suggest:    DAPT  Continue low dose  ACEi.   Increase beta blocker dose and observe BP  Statins  LAD, D2 PCI as out pt unless ischemic.  D/W mother and brother at bedside.  Out pt follow up with Dr Olson.

## 2018-08-27 DIAGNOSIS — R07.9 CHEST PAIN, UNSPECIFIED: ICD-10-CM

## 2018-08-27 DIAGNOSIS — I50.21 ACUTE SYSTOLIC (CONGESTIVE) HEART FAILURE: ICD-10-CM

## 2018-08-27 DIAGNOSIS — I25.10 ATHEROSCLEROTIC HEART DISEASE OF NATIVE CORONARY ARTERY WITHOUT ANGINA PECTORIS: ICD-10-CM

## 2018-08-27 DIAGNOSIS — E03.9 HYPOTHYROIDISM, UNSPECIFIED: ICD-10-CM

## 2018-08-27 DIAGNOSIS — I08.1 RHEUMATIC DISORDERS OF BOTH MITRAL AND TRICUSPID VALVES: ICD-10-CM

## 2018-08-27 DIAGNOSIS — I21.4 NON-ST ELEVATION (NSTEMI) MYOCARDIAL INFARCTION: ICD-10-CM

## 2018-08-27 DIAGNOSIS — I95.9 HYPOTENSION, UNSPECIFIED: ICD-10-CM

## 2018-08-27 DIAGNOSIS — E78.5 HYPERLIPIDEMIA, UNSPECIFIED: ICD-10-CM

## 2018-08-27 DIAGNOSIS — I10 ESSENTIAL (PRIMARY) HYPERTENSION: ICD-10-CM

## 2018-08-27 DIAGNOSIS — I27.20 PULMONARY HYPERTENSION, UNSPECIFIED: ICD-10-CM

## 2018-08-27 DIAGNOSIS — G47.30 SLEEP APNEA, UNSPECIFIED: ICD-10-CM

## 2018-08-27 DIAGNOSIS — I34.0 NONRHEUMATIC MITRAL (VALVE) INSUFFICIENCY: ICD-10-CM

## 2018-08-27 DIAGNOSIS — E78.00 PURE HYPERCHOLESTEROLEMIA, UNSPECIFIED: ICD-10-CM

## 2018-08-27 DIAGNOSIS — R57.0 CARDIOGENIC SHOCK: ICD-10-CM

## 2018-08-27 DIAGNOSIS — I11.0 HYPERTENSIVE HEART DISEASE WITH HEART FAILURE: ICD-10-CM

## 2018-08-27 DIAGNOSIS — F32.9 MAJOR DEPRESSIVE DISORDER, SINGLE EPISODE, UNSPECIFIED: ICD-10-CM

## 2020-12-12 ENCOUNTER — NURSE TRIAGE (OUTPATIENT)
Dept: OTHER | Facility: OTHER | Age: 55
End: 2020-12-12

## 2021-01-06 ENCOUNTER — IMMUNIZATIONS (OUTPATIENT)
Dept: FAMILY MEDICINE CLINIC | Facility: HOSPITAL | Age: 56
End: 2021-01-06

## 2021-01-06 DIAGNOSIS — Z23 ENCOUNTER FOR IMMUNIZATION: ICD-10-CM

## 2021-01-06 PROCEDURE — 0011A SARS-COV-2 / COVID-19 MRNA VACCINE (MODERNA) 100 MCG: CPT

## 2021-01-06 PROCEDURE — 91301 SARS-COV-2 / COVID-19 MRNA VACCINE (MODERNA) 100 MCG: CPT

## 2021-02-04 ENCOUNTER — IMMUNIZATIONS (OUTPATIENT)
Dept: FAMILY MEDICINE CLINIC | Facility: HOSPITAL | Age: 56
End: 2021-02-04

## 2021-02-04 DIAGNOSIS — Z23 ENCOUNTER FOR IMMUNIZATION: Primary | ICD-10-CM

## 2021-02-04 PROCEDURE — 0012A SARS-COV-2 / COVID-19 MRNA VACCINE (MODERNA) 100 MCG: CPT

## 2021-02-04 PROCEDURE — 91301 SARS-COV-2 / COVID-19 MRNA VACCINE (MODERNA) 100 MCG: CPT

## 2021-10-15 ENCOUNTER — OFFICE VISIT (OUTPATIENT)
Dept: GASTROENTEROLOGY | Facility: CLINIC | Age: 56
End: 2021-10-15
Payer: COMMERCIAL

## 2021-10-15 VITALS
DIASTOLIC BLOOD PRESSURE: 90 MMHG | HEIGHT: 67 IN | SYSTOLIC BLOOD PRESSURE: 132 MMHG | HEART RATE: 74 BPM | WEIGHT: 184 LBS | BODY MASS INDEX: 28.88 KG/M2

## 2021-10-15 DIAGNOSIS — Z12.11 COLON CANCER SCREENING: Primary | ICD-10-CM

## 2021-10-15 PROCEDURE — 99203 OFFICE O/P NEW LOW 30 MIN: CPT | Performed by: INTERNAL MEDICINE

## 2021-10-15 RX ORDER — LISINOPRIL 10 MG/1
TABLET ORAL
COMMUNITY
Start: 2021-09-29 | End: 2022-06-02 | Stop reason: SDUPTHER

## 2021-10-15 RX ORDER — LEVOTHYROXINE SODIUM 0.1 MG/1
TABLET ORAL
COMMUNITY
Start: 2021-09-06 | End: 2022-06-02 | Stop reason: SDUPTHER

## 2021-10-15 RX ORDER — ATORVASTATIN CALCIUM 80 MG/1
TABLET, FILM COATED ORAL
COMMUNITY
Start: 2021-08-22 | End: 2022-06-02 | Stop reason: SDUPTHER

## 2021-10-15 RX ORDER — METOPROLOL SUCCINATE 25 MG/1
TABLET, EXTENDED RELEASE ORAL
COMMUNITY
Start: 2021-09-29 | End: 2022-06-02 | Stop reason: SDUPTHER

## 2021-10-15 RX ORDER — ICOSAPENT ETHYL 1000 MG/1
CAPSULE ORAL
COMMUNITY
Start: 2021-07-26

## 2021-10-15 RX ORDER — ROSUVASTATIN CALCIUM 40 MG/1
40 TABLET, COATED ORAL DAILY
COMMUNITY
End: 2022-03-01 | Stop reason: ALTCHOICE

## 2021-10-22 ENCOUNTER — TELEPHONE (OUTPATIENT)
Dept: GASTROENTEROLOGY | Facility: CLINIC | Age: 56
End: 2021-10-22

## 2021-10-26 ENCOUNTER — TRANSCRIBE ORDERS (OUTPATIENT)
Dept: GASTROENTEROLOGY | Facility: CLINIC | Age: 56
End: 2021-10-26

## 2021-10-26 DIAGNOSIS — Z12.11 COLON CANCER SCREENING: Primary | ICD-10-CM

## 2021-10-26 DIAGNOSIS — Z11.59 SPECIAL SCREENING EXAMINATION FOR VIRAL DISEASE: ICD-10-CM

## 2021-12-13 ENCOUNTER — TELEPHONE (OUTPATIENT)
Dept: GASTROENTEROLOGY | Facility: HOSPITAL | Age: 56
End: 2021-12-13

## 2021-12-14 ENCOUNTER — HOSPITAL ENCOUNTER (OUTPATIENT)
Dept: GASTROENTEROLOGY | Facility: HOSPITAL | Age: 56
Setting detail: OUTPATIENT SURGERY
Discharge: HOME | End: 2021-12-14
Attending: INTERNAL MEDICINE | Admitting: INTERNAL MEDICINE
Payer: COMMERCIAL

## 2021-12-14 ENCOUNTER — ANESTHESIA (OUTPATIENT)
Dept: GASTROENTEROLOGY | Facility: HOSPITAL | Age: 56
End: 2021-12-14

## 2021-12-14 ENCOUNTER — ANESTHESIA EVENT (OUTPATIENT)
Dept: GASTROENTEROLOGY | Facility: HOSPITAL | Age: 56
End: 2021-12-14

## 2021-12-14 VITALS
HEART RATE: 73 BPM | RESPIRATION RATE: 20 BRPM | HEIGHT: 68 IN | DIASTOLIC BLOOD PRESSURE: 95 MMHG | SYSTOLIC BLOOD PRESSURE: 145 MMHG | OXYGEN SATURATION: 95 % | WEIGHT: 190.48 LBS | TEMPERATURE: 98.5 F | BODY MASS INDEX: 28.87 KG/M2

## 2021-12-14 DIAGNOSIS — Z12.11 COLON CANCER SCREENING: ICD-10-CM

## 2021-12-14 PROCEDURE — G0105 COLORECTAL SCRN; HI RISK IND: HCPCS | Performed by: INTERNAL MEDICINE

## 2021-12-14 RX ORDER — PROPOFOL 10 MG/ML
INJECTION, EMULSION INTRAVENOUS AS NEEDED
Status: DISCONTINUED | OUTPATIENT
Start: 2021-12-14 | End: 2021-12-14

## 2021-12-14 RX ORDER — SODIUM CHLORIDE, SODIUM LACTATE, POTASSIUM CHLORIDE, CALCIUM CHLORIDE 600; 310; 30; 20 MG/100ML; MG/100ML; MG/100ML; MG/100ML
100 INJECTION, SOLUTION INTRAVENOUS CONTINUOUS
Status: DISCONTINUED | OUTPATIENT
Start: 2021-12-14 | End: 2021-12-18 | Stop reason: HOSPADM

## 2021-12-14 RX ORDER — ASPIRIN 81 MG/1
81 TABLET, CHEWABLE ORAL DAILY
COMMUNITY

## 2021-12-14 RX ORDER — SODIUM CHLORIDE, SODIUM LACTATE, POTASSIUM CHLORIDE, CALCIUM CHLORIDE 600; 310; 30; 20 MG/100ML; MG/100ML; MG/100ML; MG/100ML
INJECTION, SOLUTION INTRAVENOUS CONTINUOUS PRN
Status: DISCONTINUED | OUTPATIENT
Start: 2021-12-14 | End: 2021-12-14

## 2021-12-14 RX ORDER — LIDOCAINE HYDROCHLORIDE 10 MG/ML
INJECTION, SOLUTION EPIDURAL; INFILTRATION; INTRACAUDAL; PERINEURAL AS NEEDED
Status: DISCONTINUED | OUTPATIENT
Start: 2021-12-14 | End: 2021-12-14

## 2021-12-14 RX ADMIN — PROPOFOL 20 MG: 10 INJECTION, EMULSION INTRAVENOUS at 08:30

## 2021-12-14 RX ADMIN — SODIUM CHLORIDE, SODIUM LACTATE, POTASSIUM CHLORIDE, AND CALCIUM CHLORIDE 100 ML/HR: .6; .31; .03; .02 INJECTION, SOLUTION INTRAVENOUS at 07:36

## 2021-12-14 RX ADMIN — PROPOFOL 20 MG: 10 INJECTION, EMULSION INTRAVENOUS at 08:33

## 2021-12-14 RX ADMIN — SODIUM CHLORIDE, SODIUM LACTATE, POTASSIUM CHLORIDE, AND CALCIUM CHLORIDE: .6; .31; .03; .02 INJECTION, SOLUTION INTRAVENOUS at 08:25

## 2021-12-14 RX ADMIN — LIDOCAINE HYDROCHLORIDE 50 MG: 10 INJECTION, SOLUTION EPIDURAL; INFILTRATION; INTRACAUDAL; PERINEURAL at 08:26

## 2021-12-14 RX ADMIN — PROPOFOL 150 MG: 10 INJECTION, EMULSION INTRAVENOUS at 08:26

## 2021-12-22 ENCOUNTER — TELEPHONE (OUTPATIENT)
Dept: SLEEP CENTER | Facility: CLINIC | Age: 56
End: 2021-12-22

## 2021-12-22 ENCOUNTER — TELEPHONE (OUTPATIENT)
Dept: OTHER | Facility: OTHER | Age: 56
End: 2021-12-22

## 2022-03-01 ENCOUNTER — OFFICE VISIT (OUTPATIENT)
Dept: INTERNAL MEDICINE CLINIC | Facility: CLINIC | Age: 57
End: 2022-03-01
Payer: COMMERCIAL

## 2022-03-01 VITALS
OXYGEN SATURATION: 96 % | HEART RATE: 64 BPM | BODY MASS INDEX: 28.79 KG/M2 | DIASTOLIC BLOOD PRESSURE: 70 MMHG | HEIGHT: 68 IN | RESPIRATION RATE: 20 BRPM | SYSTOLIC BLOOD PRESSURE: 118 MMHG | WEIGHT: 190 LBS | TEMPERATURE: 97.1 F

## 2022-03-01 DIAGNOSIS — Z11.59 NEED FOR HEPATITIS C SCREENING TEST: ICD-10-CM

## 2022-03-01 DIAGNOSIS — Z12.5 SCREENING FOR PROSTATE CANCER: ICD-10-CM

## 2022-03-01 DIAGNOSIS — R12 HEARTBURN: ICD-10-CM

## 2022-03-01 DIAGNOSIS — E78.49 OTHER HYPERLIPIDEMIA: Chronic | ICD-10-CM

## 2022-03-01 DIAGNOSIS — E03.9 ACQUIRED HYPOTHYROIDISM: Chronic | ICD-10-CM

## 2022-03-01 DIAGNOSIS — Z99.89 OSA ON CPAP: Chronic | ICD-10-CM

## 2022-03-01 DIAGNOSIS — I25.10 CORONARY ARTERY DISEASE INVOLVING NATIVE CORONARY ARTERY OF NATIVE HEART WITHOUT ANGINA PECTORIS: Primary | Chronic | ICD-10-CM

## 2022-03-01 DIAGNOSIS — G47.33 OSA ON CPAP: Chronic | ICD-10-CM

## 2022-03-01 PROBLEM — Z86.010 PERSONAL HISTORY OF COLONIC POLYPS: Status: ACTIVE | Noted: 2022-03-01

## 2022-03-01 PROBLEM — E78.5 HYPERLIPIDEMIA: Chronic | Status: ACTIVE | Noted: 2022-03-01

## 2022-03-01 PROBLEM — E78.5 HYPERLIPIDEMIA: Status: ACTIVE | Noted: 2022-03-01

## 2022-03-01 PROBLEM — Z86.0100 PERSONAL HISTORY OF COLONIC POLYPS: Status: ACTIVE | Noted: 2022-03-01

## 2022-03-01 PROBLEM — G47.30 SLEEP APNEA: Status: ACTIVE | Noted: 2022-03-01

## 2022-03-01 PROCEDURE — 1036F TOBACCO NON-USER: CPT | Performed by: INTERNAL MEDICINE

## 2022-03-01 PROCEDURE — 99204 OFFICE O/P NEW MOD 45 MIN: CPT | Performed by: INTERNAL MEDICINE

## 2022-03-01 PROCEDURE — 3008F BODY MASS INDEX DOCD: CPT | Performed by: INTERNAL MEDICINE

## 2022-03-01 PROCEDURE — 3725F SCREEN DEPRESSION PERFORMED: CPT | Performed by: INTERNAL MEDICINE

## 2022-03-01 RX ORDER — OMEGA-3-ACID ETHYL ESTERS 1 G/1
2 CAPSULE, LIQUID FILLED ORAL 2 TIMES DAILY
COMMUNITY
End: 2022-06-02 | Stop reason: SDUPTHER

## 2022-03-01 RX ORDER — FAMOTIDINE 40 MG/1
40 TABLET, FILM COATED ORAL DAILY
Qty: 90 TABLET | Refills: 1 | Status: SHIPPED | OUTPATIENT
Start: 2022-03-01 | End: 2022-06-02 | Stop reason: SDUPTHER

## 2022-03-01 NOTE — PROGRESS NOTES
INTERNAL MEDICINE INITIAL OFFICE VISIT  [facility] [address] [place] [address]    NAME: Handy Mederos  AGE: 61 y o  SEX: male  : 1965     DATE: 3/1/2022     Assessment and Plan:     1  Coronary artery disease involving native coronary artery of native heart without angina pectoris    Stable w/o angina  On appropriate cardiovascular regimen  Needs to increase cardiovascular exercise  Establish with cardiologist in [place]     - Ambulatory Referral to Cardiology; Future  - CBC; Future    2  Other hyperlipidemia    Check lipids  Continue high intensity statin with CAD history  - Lipid Panel with Direct LDL reflex; Future  - Comprehensive metabolic panel; Future  - CBC; Future    3  Acquired hypothyroidism    Check updated thyroid function  Continue levothyroxine     - TSH, 3rd generation with Free T4 reflex; Future    4  DAVID on CPAP    He is compliant with CPAP use and then his machine got recalled  He recently had an updated sleep study in [address] [place] [address] Avenue  Recommend continued CPAP use  5  Heartburn    Watch diet  Start pepcid  - famotidine (PEPCID) 40 MG tablet; Take 1 tablet (40 mg total) by mouth daily  Dispense: 90 tablet; Refill: 1    6  Need for hepatitis C screening test  - Hepatitis C antibody; Future    7  Screening for prostate cancer  - PSA, Total Screen; Future    BMI Counseling: Body mass index is 28 89 kg/m²  The BMI is above normal  Exercise recommendations include exercising 3-5 times per week and strength training exercises  Rationale for BMI follow-up plan is due to patient being overweight or obese  Depression Screening and Follow-up Plan: Patient was screened for depression during today's encounter  They screened negative with a PHQ-2 score of 0         Return in about 2 months (around 2022) for Annual Physical      Chief Complaint:     Chief Complaint   Patient presents with    Establish Care      History of Present Illness:     Juan Lomax presents to establish care  Was following with PCP in [place]  Has lived in [place] for about 3 years  Has history of CAD, HTN, HLD, ADVID on CPAP, hypothyroidism  Had been seeing cardiologist in [place]  Prior history of PERLITA to proximal RCA and proximal LCX  Also history of CABG  Taking aspirin, high intensity statin, toprol-XL, and lisinopril  No recent cardiac symptoms  DAVID on CPAP  Diagnosed many years ago  Recently had updated sleep study in [address] [place] [address] Avenue  Was going to do consult in [place] but took too long to get an appointment  On levothyroxine for hypothyroidism  Last TSH was normal     Does have some heartburn episodes at times  His gf is a physician and has noted episodes of reflux  Prior history of colon polyps  Had updated colonoscopy in the fall without any significant findings  The following portions of the patient's history were reviewed and updated as appropriate: allergies, current medications, past family history, past medical history, past social history, past surgical history and problem list      Review of Systems:     Review of Systems   Constitutional: Negative for appetite change, chills, fatigue and fever  HENT: Negative for congestion, hearing loss, postnasal drip, rhinorrhea, sore throat, tinnitus and trouble swallowing  Eyes: Negative for pain, discharge, redness and visual disturbance  Respiratory: Negative for cough, chest tightness, shortness of breath and wheezing  Cardiovascular: Negative for chest pain, palpitations and leg swelling  Gastrointestinal: Negative for abdominal distention, abdominal pain, blood in stool, constipation, diarrhea, nausea and vomiting  Heartburn   Endocrine: Negative for cold intolerance, heat intolerance, polydipsia, polyphagia and polyuria  Genitourinary: Negative for difficulty urinating, dysuria, frequency, hematuria and urgency  Musculoskeletal: Negative for arthralgias, back pain, gait problem, joint swelling, myalgias, neck pain and neck stiffness     Skin: Negative for color change and rash  Neurological: Negative for dizziness, tremors, seizures, syncope, speech difficulty, weakness, light-headedness, numbness and headaches  Hematological: Negative for adenopathy  Does not bruise/bleed easily  Psychiatric/Behavioral: Negative for agitation, behavioral problems, confusion, hallucinations, sleep disturbance and suicidal ideas  The patient is not nervous/anxious         Past Medical History:     Past Medical History:   Diagnosis Date    Coronary artery disease     CPAP (continuous positive airway pressure) dependence     History of heart attack     Hyperlipidemia     Hypertension     Sleep apnea     Thyroid disease       Past Surgical History:     Past Surgical History:   Procedure Laterality Date    CORONARY ARTERY BYPASS GRAFT      CORONARY STENT PLACEMENT        Social History:     Social History     Socioeconomic History    Marital status:      Spouse name: None    Number of children: None    Years of education: None    Highest education level: None   Occupational History    None   Tobacco Use    Smoking status: Never Smoker    Smokeless tobacco: Never Used   Vaping Use    Vaping Use: Never used   Substance and Sexual Activity    Alcohol use: Yes     Comment: socially    Drug use: Never    Sexual activity: None   Other Topics Concern    None   Social History Narrative    None     Social Determinants of Health     Financial Resource Strain: Not on file   Food Insecurity: Not on file   Transportation Needs: Not on file   Physical Activity: Not on file   Stress: Not on file   Social Connections: Not on file   Intimate Partner Violence: Not on file   Housing Stability: Not on file       Family History:     Family History   Problem Relation Age of Onset    Ovarian cancer Mother     Hypertension Mother     Blindness Mother     Heart attack Father     Prostate cancer Neg Hx     Colon cancer Neg Hx       Current Medications: Current Outpatient Medications:     aspirin 81 mg chewable tablet, Chew 81 mg daily, Disp: , Rfl:     atorvastatin (LIPITOR) 80 mg tablet, , Disp: , Rfl:     levothyroxine 100 mcg tablet, , Disp: , Rfl:     lisinopril (ZESTRIL) 10 mg tablet, , Disp: , Rfl:     metoprolol succinate (TOPROL-XL) 25 mg 24 hr tablet, , Disp: , Rfl:     omega-3-acid ethyl esters (LOVAZA) 1 g capsule, Take 2 g by mouth 2 (two) times a day, Disp: , Rfl:     famotidine (PEPCID) 40 MG tablet, Take 1 tablet (40 mg total) by mouth daily, Disp: 90 tablet, Rfl: 1    Vascepa 1 g CAPS, , Disp: , Rfl:      Allergies:   No Known Allergies     Physical Exam:     /70 (BP Location: Left arm, Patient Position: Sitting, Cuff Size: Adult)   Pulse 64   Temp (!) 97 1 °F (36 2 °C) (Tympanic)   Resp 20   Ht 5' 8" (1 727 m)   Wt 86 2 kg (190 lb)   SpO2 96%   BMI 28 89 kg/m²     Physical Exam  Vitals reviewed  Constitutional:       General: He is not in acute distress  Appearance: He is well-developed  He is not diaphoretic  Eyes:      General: No scleral icterus  Right eye: No discharge  Left eye: No discharge  Conjunctiva/sclera: Conjunctivae normal    Neck:      Thyroid: No thyromegaly  Vascular: No JVD  Cardiovascular:      Rate and Rhythm: Normal rate and regular rhythm  Heart sounds: Normal heart sounds  No murmur heard  Pulmonary:      Effort: Pulmonary effort is normal  No respiratory distress  Breath sounds: Normal breath sounds  No wheezing or rales  Abdominal:      General: Bowel sounds are normal  There is no distension  Palpations: Abdomen is soft  Tenderness: There is no abdominal tenderness  Musculoskeletal:      Cervical back: Neck supple  Right lower leg: No edema  Left lower leg: No edema  Lymphadenopathy:      Cervical: No cervical adenopathy  Skin:     General: Skin is warm and dry  Neurological:      Mental Status: He is alert  Psychiatric:         Mood and Affect: Mood normal          Behavior: Behavior normal         [facility] [address]

## 2022-03-10 ENCOUNTER — TELEPHONE (OUTPATIENT)
Dept: INTERNAL MEDICINE CLINIC | Facility: CLINIC | Age: 57
End: 2022-03-10

## 2022-03-10 NOTE — TELEPHONE ENCOUNTER
----- Message from Theodore Saldana DO sent at 3/10/2022  7:03 AM EST -----  Call patient and let him know that I reviewed their recent laboratory testing and labs were normal

## 2022-03-10 NOTE — TELEPHONE ENCOUNTER
----- Message from Anthony Gomes DO sent at 3/10/2022  7:03 AM EST -----  Call patient and let him know that I reviewed their recent laboratory testing and labs were normal

## 2022-04-21 ENCOUNTER — CONSULT (OUTPATIENT)
Dept: CARDIOLOGY CLINIC | Facility: CLINIC | Age: 57
End: 2022-04-21
Payer: COMMERCIAL

## 2022-04-21 VITALS
HEIGHT: 68 IN | OXYGEN SATURATION: 97 % | RESPIRATION RATE: 16 BRPM | WEIGHT: 192 LBS | BODY MASS INDEX: 29.1 KG/M2 | HEART RATE: 65 BPM | DIASTOLIC BLOOD PRESSURE: 86 MMHG | SYSTOLIC BLOOD PRESSURE: 124 MMHG

## 2022-04-21 DIAGNOSIS — I10 PRIMARY HYPERTENSION: ICD-10-CM

## 2022-04-21 DIAGNOSIS — E78.2 MIXED HYPERLIPIDEMIA: ICD-10-CM

## 2022-04-21 DIAGNOSIS — I25.10 ATHEROSCLEROSIS OF NATIVE CORONARY ARTERY OF NATIVE HEART WITHOUT ANGINA PECTORIS: Primary | ICD-10-CM

## 2022-04-21 PROCEDURE — 3008F BODY MASS INDEX DOCD: CPT | Performed by: INTERNAL MEDICINE

## 2022-04-21 PROCEDURE — 99204 OFFICE O/P NEW MOD 45 MIN: CPT | Performed by: INTERNAL MEDICINE

## 2022-04-21 PROCEDURE — 93000 ELECTROCARDIOGRAM COMPLETE: CPT | Performed by: INTERNAL MEDICINE

## 2022-04-21 PROCEDURE — 1036F TOBACCO NON-USER: CPT | Performed by: INTERNAL MEDICINE

## 2022-04-21 RX ORDER — BUPROPION HYDROCHLORIDE 150 MG/1
TABLET ORAL
COMMUNITY
Start: 2022-04-21

## 2022-04-21 NOTE — PROGRESS NOTES
Cardiology Consultation     Deloris Dye  [id number]  1965  [address] [place] [address]    1  Atherosclerosis of native coronary artery of native heart without angina pectoris  Ambulatory Referral to Cardiology    POCT ECG    Echo complete w/ contrast if indicated   2  Primary hypertension     3  Mixed hyperlipidemia         Chief Complaint: To establish cardiology care    HPI:    62-year-old male with coronary artery disease status post PCI to proximal circumflex and proximal RCA in 2018 for NSTEMI in [place], hyperlipidemia, hypothyroidism, obstructive sleep apnea on CPAP, acid reflux was referred from primary care physician's office to establish cardiology care    Patient recently moved to [address] [place] [address] from [place] and would like to establish care with Cardiology  He has seen his primary cardiologist before 1 year  Since then patient is doing well  He denies chest pain, shortness of breath, palpitation, dizziness, orthopnea, leg edema or loss of consciousness  No chest pain or shortness of breath on exertion  Patient is trying to do more exercise    He denies any black stool or blood in stool    Social History:  Denies smoking, alcohol intake or illicit drug use  Family History:  Father had MI in his 46s and  of MI in his 62s    Patient had cardiac stress test before 2 years with primary cardiologist and everything was okay  As per patient he had few episodes of vasovagal syncope before and it was secondary to dehydration from diuretics which was discontinued  No vasovagal episode since last cardiology clinic visit    Current medications reviewed  Labs reviewed in Care everywhere done in 2022  Creatinine 1 28, AST ALT within normal limit, hemoglobin normal, TSH within normal limits  LDL 69    Review of Systems:   All review of system negative except as mentioned    Patient Active Problem List   Diagnosis    Coronary artery disease    Hyperlipidemia    Acquired hypothyroidism    DAVID on CPAP    Personal history of colonic polyps     Past Medical History:   Diagnosis Date    Coronary artery disease     CPAP (continuous positive airway pressure) dependence     History of heart attack     Hyperlipidemia     Hypertension     Sleep apnea     Thyroid disease      Social History     Socioeconomic History    Marital status:      Spouse name: Not on file    Number of children: Not on file    Years of education: Not on file    Highest education level: Not on file   Occupational History    Not on file   Tobacco Use    Smoking status: Never Smoker    Smokeless tobacco: Never Used   Vaping Use    Vaping Use: Never used   Substance and Sexual Activity    Alcohol use: Yes     Comment: socially    Drug use: Never    Sexual activity: Not on file   Other Topics Concern    Not on file   Social History Narrative    Not on file     Social Determinants of Health     Financial Resource Strain: Not on file   Food Insecurity: Not on file   Transportation Needs: Not on file   Physical Activity: Not on file   Stress: Not on file   Social Connections: Not on file   Intimate Partner Violence: Not on file   Housing Stability: Not on file      Family History   Problem Relation Age of Onset    Ovarian cancer Mother     Hypertension Mother     Blindness Mother     Heart attack Father     Prostate cancer Neg Hx     Colon cancer Neg Hx      Past Surgical History:   Procedure Laterality Date    CORONARY ARTERY BYPASS GRAFT      CORONARY STENT PLACEMENT         Current Outpatient Medications:     aspirin 81 mg chewable tablet, Chew 81 mg daily, Disp: , Rfl:     atorvastatin (LIPITOR) 80 mg tablet, , Disp: , Rfl:     buPROPion (WELLBUTRIN XL) 150 mg 24 hr tablet, , Disp: , Rfl:     famotidine (PEPCID) 40 MG tablet, Take 1 tablet (40 mg total) by mouth daily, Disp: 90 tablet, Rfl: 1    levothyroxine 100 mcg tablet, , Disp: , Rfl:     lisinopril (ZESTRIL) 10 mg tablet, , Disp: , Rfl:     metoprolol succinate (TOPROL-XL) 25 mg 24 hr tablet, , Disp: , Rfl:     omega-3-acid ethyl esters (LOVAZA) 1 g capsule, Take 2 g by mouth 2 (two) times a day, Disp: , Rfl:     Vascepa 1 g CAPS, , Disp: , Rfl:   No Known Allergies  Vitals:    04/21/22 1342   BP: 124/86   BP Location: Left arm   Patient Position: Sitting   Cuff Size: Standard   Pulse: 65   Resp: 16   SpO2: 97%   Weight: 87 1 kg (192 lb)   Height: 5' 8" (1 727 m)         Labs:  Orders Only on 03/07/2022   Component Date Value    HEP C AB 03/07/2022 NEGATIVE      Imaging: No results found  Physical Exam:  General:  Overweight, awake, alert and oriented x3, not in distress  Neck: supple, no JVD  Eyes: PERRL, conjunctiva normal  Lungs:  Bilateral air entry positive, no wheeze/rhonchi or crackle  Heart:  S1-S2 normal, no murmur  Abdomen:  Soft ,nondistended ,nontender, bowel sounds positive  Extremities:  No leg edema, no deformity, ROM normal  Neuro:  Moving all extremities, speech clear  Skin: warm, no rash    /86 (BP Location: Left arm, Patient Position: Sitting, Cuff Size: Standard)   Pulse 65   Resp 16   Ht 5' 8" (1 727 m)   Wt 87 1 kg (192 lb)   SpO2 97%   BMI 29 19 kg/m²       Cardiographics :  ECG:  Today showed sinus bradycardia heart rate 54, motion artifact, inferior infarct age undetermined      Assessment:    1  Coronary artery disease  Status post PCI of proximal RCA and proximal circumflex in 2018 August for NSTEMI in [place]  At present time patient denies chest pain or shortness of breath    2  Hypertension  3  Hyperlipidemia  4  Obstructive sleep apnea on CPAP  5  Family history of premature coronary artery disease  6  Hypothyroidism  7   GERD    Recommendations:    Patient is seen 1st time by me in Cardiology Clinic  At present time he denies any cardiac symptoms  I will get 2D echocardiogram to evaluate for structural heart disease in baseline LVEF    Patient to continue aspirin 81, Lipitor 80, vessel past, lisinopril 10 mg, Toprol-XL 25 mg(patient was on Plavix for 1 year post PCI and then it was discontinued)    Advised to take low-salt, low-fat/low-cholesterol diet and try to lose weight  Encouraged to do more exercise  He was educated about cardiac symptoms to watch out for  Call 911 or go to nearest ED as needed  Return to clinic in 6 months or early as needed  Above all discussed with patient    Patient understands and agrees

## 2022-05-18 ENCOUNTER — HOSPITAL ENCOUNTER (OUTPATIENT)
Dept: NON INVASIVE DIAGNOSTICS | Facility: CLINIC | Age: 57
Discharge: HOME | End: 2022-05-18
Payer: COMMERCIAL

## 2022-05-18 VITALS
HEART RATE: 65 BPM | BODY MASS INDEX: 29.1 KG/M2 | WEIGHT: 192 LBS | HEIGHT: 68 IN | DIASTOLIC BLOOD PRESSURE: 86 MMHG | SYSTOLIC BLOOD PRESSURE: 124 MMHG

## 2022-05-18 DIAGNOSIS — I25.10 ATHEROSCLEROSIS OF NATIVE CORONARY ARTERY OF NATIVE HEART WITHOUT ANGINA PECTORIS: ICD-10-CM

## 2022-05-18 PROCEDURE — 93306 TTE W/DOPPLER COMPLETE: CPT

## 2022-05-18 PROCEDURE — 93306 TTE W/DOPPLER COMPLETE: CPT | Performed by: INTERNAL MEDICINE

## 2022-06-02 DIAGNOSIS — R12 HEARTBURN: ICD-10-CM

## 2022-06-02 DIAGNOSIS — I25.10 CORONARY ARTERY DISEASE INVOLVING NATIVE CORONARY ARTERY OF NATIVE HEART WITHOUT ANGINA PECTORIS: ICD-10-CM

## 2022-06-02 DIAGNOSIS — E78.49 OTHER HYPERLIPIDEMIA: ICD-10-CM

## 2022-06-02 DIAGNOSIS — E03.9 ACQUIRED HYPOTHYROIDISM: Primary | ICD-10-CM

## 2022-06-02 RX ORDER — LISINOPRIL 10 MG/1
10 TABLET ORAL DAILY
Qty: 90 TABLET | Refills: 1 | Status: SHIPPED | OUTPATIENT
Start: 2022-06-02

## 2022-06-02 RX ORDER — OMEGA-3-ACID ETHYL ESTERS 1 G/1
2 CAPSULE, LIQUID FILLED ORAL 2 TIMES DAILY
Qty: 360 CAPSULE | Refills: 1 | Status: SHIPPED | OUTPATIENT
Start: 2022-06-02 | End: 2022-11-29

## 2022-06-02 RX ORDER — FAMOTIDINE 40 MG/1
40 TABLET, FILM COATED ORAL DAILY
Qty: 90 TABLET | Refills: 1 | Status: SHIPPED | OUTPATIENT
Start: 2022-06-02 | End: 2022-11-29

## 2022-06-02 RX ORDER — ATORVASTATIN CALCIUM 80 MG/1
80 TABLET, FILM COATED ORAL DAILY
Qty: 90 TABLET | Refills: 1 | Status: SHIPPED | OUTPATIENT
Start: 2022-06-02

## 2022-06-02 RX ORDER — LEVOTHYROXINE SODIUM 0.1 MG/1
100 TABLET ORAL DAILY
Qty: 90 TABLET | Refills: 1 | Status: SHIPPED | OUTPATIENT
Start: 2022-06-02

## 2022-06-02 RX ORDER — METOPROLOL SUCCINATE 25 MG/1
25 TABLET, EXTENDED RELEASE ORAL DAILY
Qty: 90 TABLET | Refills: 1 | Status: SHIPPED | OUTPATIENT
Start: 2022-06-02

## 2022-06-02 NOTE — TELEPHONE ENCOUNTER
Patient reports being a new patient of [address]; OV 3/1/22  Called in requesting new prescriptions for all his medications  Reviewed process or refills with patient

## 2022-07-25 ENCOUNTER — OFFICE VISIT (OUTPATIENT)
Dept: INTERNAL MEDICINE CLINIC | Facility: CLINIC | Age: 57
End: 2022-07-25
Payer: COMMERCIAL

## 2022-07-25 VITALS
WEIGHT: 187.8 LBS | HEIGHT: 68 IN | SYSTOLIC BLOOD PRESSURE: 122 MMHG | DIASTOLIC BLOOD PRESSURE: 78 MMHG | OXYGEN SATURATION: 97 % | RESPIRATION RATE: 18 BRPM | BODY MASS INDEX: 28.46 KG/M2 | TEMPERATURE: 97.4 F | HEART RATE: 65 BPM

## 2022-07-25 DIAGNOSIS — K57.32 DIVERTICULITIS OF LARGE INTESTINE WITHOUT BLEEDING, UNSPECIFIED COMPLICATION STATUS: Primary | ICD-10-CM

## 2022-07-25 PROCEDURE — 99213 OFFICE O/P EST LOW 20 MIN: CPT | Performed by: FAMILY MEDICINE

## 2022-07-25 RX ORDER — METRONIDAZOLE 500 MG/1
TABLET ORAL
COMMUNITY
Start: 2022-07-22 | End: 2022-08-30 | Stop reason: CLARIF

## 2022-07-25 RX ORDER — CIPROFLOXACIN 500 MG/1
TABLET, FILM COATED ORAL
COMMUNITY
Start: 2022-07-22 | End: 2022-08-30 | Stop reason: CLARIF

## 2022-07-25 RX ORDER — AZELASTINE 1 MG/ML
SPRAY, METERED NASAL
COMMUNITY
Start: 2022-06-11 | End: 2022-08-30 | Stop reason: SDUPTHER

## 2022-07-25 NOTE — PROGRESS NOTES
FOLLOW-UP OFFICE VISIT  [facility] [address] [place] [address]    NAME: Andrew Paris  AGE: 61 y o  SEX: male  : 1965     DATE: 2022     Assessment and Plan:     Problem List Items Addressed This Visit    None     Visit Diagnoses     Diverticulitis of large intestine without bleeding, unspecified complication status    -  Primary            after review of the chart patient does have moderate diverticulosis of the sigmoid colon based on  colonoscopy  Currently on ciprofloxacin and metronidazole with significant improvement  Etiology is likely his 1st bout of diverticulitis  Patient encouraged to finish antibiotic that was prescribed  Patient encouraged to monitor for any fever or rebound worsening pain  Information about diverticulitis with diverticulosis friendly  diet has been provided  Return if symptoms worsen or fail to improve  Chief Complaint:     Chief Complaint   Patient presents with    Physical Exam     Pt states that he was on vacation and he started to feel lower abdomen pain was prescribe by doctor medication        History of Present Illness:     Pt present c/o left lower abdomen pain since thrusday evening  Felt bloated and constipated  Ate and pain worsened  Friday night was given cipro and metronidazole by another provider  Has been taking them and felt better  Has had a protien shake this morning and there was no signifcant aggravation of the pain  BMs were normal consistency  Most recent BM  was today  No rectal pain or blood  No known hx of diverticulitis/ diverticulosis  Last colonocospy witin the last year        Review of Systems:     Per HPI      Problem List:     Patient Active Problem List   Diagnosis    Coronary artery disease    Hyperlipidemia    Acquired hypothyroidism    DAVID on CPAP    Personal history of colonic polyps        Objective:     /78 (BP Location: Left arm, Patient Position: Sitting, Cuff Size: Standard) Pulse 65   Temp (!) 97 4 °F (36 3 °C) (Temporal)   Resp 18   Ht 5' 8" (1 727 m)   Wt 85 2 kg (187 lb 12 8 oz)   SpO2 97%   BMI 28 55 kg/m²     Physical Exam  Constitutional:       Appearance: He is not ill-appearing  HENT:      Head: Normocephalic  Cardiovascular:      Rate and Rhythm: Normal rate  Pulmonary:      Effort: Pulmonary effort is normal    Abdominal:      General: Bowel sounds are increased  Palpations: Abdomen is soft  Tenderness: There is no abdominal tenderness  Neurological:      Mental Status: He is alert  Pertinent Laboratory/Diagnostic Studies:    Radiology/Other Diagnostic Testing Results: I have personally reviewed pertinent reports          Nirmal Piperred [facility]  7/25/2022 2:34 PM

## 2022-08-30 ENCOUNTER — OFFICE VISIT (OUTPATIENT)
Dept: INTERNAL MEDICINE CLINIC | Facility: CLINIC | Age: 57
End: 2022-08-30
Payer: COMMERCIAL

## 2022-08-30 VITALS
BODY MASS INDEX: 28.07 KG/M2 | TEMPERATURE: 97.5 F | OXYGEN SATURATION: 99 % | SYSTOLIC BLOOD PRESSURE: 118 MMHG | WEIGHT: 185.2 LBS | DIASTOLIC BLOOD PRESSURE: 70 MMHG | HEART RATE: 66 BPM | HEIGHT: 68 IN

## 2022-08-30 DIAGNOSIS — Z00.00 ADULT GENERAL MEDICAL EXAMINATION: Primary | ICD-10-CM

## 2022-08-30 DIAGNOSIS — E78.49 OTHER HYPERLIPIDEMIA: Chronic | ICD-10-CM

## 2022-08-30 DIAGNOSIS — J31.0 CHRONIC RHINITIS: ICD-10-CM

## 2022-08-30 DIAGNOSIS — E03.9 ACQUIRED HYPOTHYROIDISM: Chronic | ICD-10-CM

## 2022-08-30 DIAGNOSIS — F33.9 DEPRESSION, RECURRENT (HCC): ICD-10-CM

## 2022-08-30 PROCEDURE — 99396 PREV VISIT EST AGE 40-64: CPT | Performed by: INTERNAL MEDICINE

## 2022-08-30 PROCEDURE — 3725F SCREEN DEPRESSION PERFORMED: CPT | Performed by: INTERNAL MEDICINE

## 2022-08-30 RX ORDER — BUPROPION HYDROCHLORIDE 150 MG/1
150 TABLET ORAL EVERY MORNING
Qty: 90 TABLET | Refills: 1 | Status: SHIPPED | OUTPATIENT
Start: 2022-08-30 | End: 2022-09-07 | Stop reason: SDUPTHER

## 2022-08-30 RX ORDER — METOPROLOL SUCCINATE 25 MG/1
25 TABLET, EXTENDED RELEASE ORAL DAILY
Qty: 90 TABLET | Refills: 1 | Status: CANCELLED | OUTPATIENT
Start: 2022-08-30

## 2022-08-30 RX ORDER — ATORVASTATIN CALCIUM 80 MG/1
80 TABLET, FILM COATED ORAL DAILY
Qty: 90 TABLET | Refills: 1 | Status: CANCELLED | OUTPATIENT
Start: 2022-08-30

## 2022-08-30 RX ORDER — LEVOTHYROXINE SODIUM 0.1 MG/1
100 TABLET ORAL DAILY
Qty: 90 TABLET | Refills: 1 | Status: CANCELLED | OUTPATIENT
Start: 2022-08-30

## 2022-08-30 RX ORDER — OMEGA-3-ACID ETHYL ESTERS 1 G/1
2 CAPSULE, LIQUID FILLED ORAL 2 TIMES DAILY
Qty: 360 CAPSULE | Refills: 1 | Status: CANCELLED | OUTPATIENT
Start: 2022-08-30 | End: 2023-02-26

## 2022-08-30 RX ORDER — AZELASTINE 1 MG/ML
1-2 SPRAY, METERED NASAL 2 TIMES DAILY
Qty: 90 ML | Refills: 1 | Status: SHIPPED | OUTPATIENT
Start: 2022-08-30 | End: 2022-09-07 | Stop reason: SDUPTHER

## 2022-08-30 RX ORDER — LISINOPRIL 10 MG/1
10 TABLET ORAL DAILY
Qty: 90 TABLET | Refills: 1 | Status: CANCELLED | OUTPATIENT
Start: 2022-08-30

## 2022-08-30 RX ORDER — FAMOTIDINE 40 MG/1
40 TABLET, FILM COATED ORAL DAILY
Qty: 90 TABLET | Refills: 1 | Status: CANCELLED | OUTPATIENT
Start: 2022-08-30 | End: 2023-02-26

## 2022-08-30 NOTE — PATIENT INSTRUCTIONS
Wellness Visit for Adults   AMBULATORY CARE:   A wellness visit  is when you see your healthcare provider to get screened for health problems  Your healthcare provider will also give you advice on how to stay healthy  Write down your questions so you remember to ask them  Ask your healthcare provider how often you should have a wellness visit  What happens at a wellness visit:  Your healthcare provider will ask about your health, and your family history of health problems  This includes high blood pressure, heart disease, and cancer  He or she will ask if you have symptoms that concern you, if you smoke, and about your mood  You may also be asked about your intake of medicines, supplements, food, and alcohol  Any of the following may be done: Your weight  will be checked  Your height may also be checked so your body mass index (BMI) can be calculated  Your BMI shows if you are at a healthy weight  Your blood pressure  and heart rate will be checked  Your temperature may also be checked  Blood and urine tests  may be done  Blood tests may be done to check your cholesterol levels  Abnormal cholesterol levels increase your risk for heart disease and stroke  You may also need a blood or urine test to check for diabetes if you are at increased risk  Urine tests may be done to look for signs of an infection or kidney disease  A physical exam  includes checking your heartbeat and lungs with a stethoscope  Your healthcare provider may also check your skin to look for sun damage  Screening tests  may be recommended  A screening test is done to check for diseases that may not cause symptoms  The screening tests you may need depend on your age, gender, family history, and lifestyle habits  For example, colorectal screening may be recommended if you are 48years old or older  Screening tests you need if you are a woman:   A Pap smear  is used to screen for cervical cancer   Pap smears are usually done every 3 to 5 years depending on your age  You may need them more often if you have had abnormal Pap smear test results in the past  Ask your healthcare provider how often you should have a Pap smear  A mammogram  is an x-ray of your breasts to screen for breast cancer  Experts recommend mammograms every 2 years starting at age 48 years  You may need a mammogram at age 52 years or younger if you have an increased risk for breast cancer  Talk to your healthcare provider about when you should start having mammograms and how often you need them  Vaccines you may need:   Get an influenza vaccine  every year  The influenza vaccine protects you from the flu  Several types of viruses cause the flu  The viruses change over time, so new vaccines are made each year  Get a tetanus-diphtheria (Td) booster vaccine  every 10 years  This vaccine protects you against tetanus and diphtheria  Tetanus is a severe infection that may cause painful muscle spasms and lockjaw  Diphtheria is a severe bacterial infection that causes a thick covering in the back of your mouth and throat  Get a human papillomavirus (HPV) vaccine  if you are female and aged 23 to 32 or male 23 to 24 and never received it  This vaccine protects you from HPV infection  HPV is the most common infection spread by sexual contact  HPV may also cause vaginal, penile, and anal cancers  Get a pneumococcal vaccine  if you are aged 72 years or older  The pneumococcal vaccine is an injection given to protect you from pneumococcal disease  Pneumococcal disease is an infection caused by pneumococcal bacteria  The infection may cause pneumonia, meningitis, or an ear infection  Get a shingles vaccine  if you are 60 or older, even if you have had shingles before  The shingles vaccine is an injection to protect you from the varicella-zoster virus  This is the same virus that causes chickenpox   Shingles is a painful rash that develops in people who had chickenpox or have been exposed to the virus  How to eat healthy:  My Plate is a model for planning healthy meals  It shows the types and amounts of foods that should go on your plate  Fruits and vegetables make up about half of your plate, and grains and protein make up the other half  A serving of dairy is included on the side of your plate  The amount of calories and serving sizes you need depends on your age, gender, weight, and height  Examples of healthy foods are listed below:  Eat a variety of vegetables  such as dark green, red, and orange vegetables  You can also include canned vegetables low in sodium (salt) and frozen vegetables without added butter or sauces  Eat a variety of fresh fruits , canned fruit in 100% juice, frozen fruit, and dried fruit  Include whole grains  At least half of the grains you eat should be whole grains  Examples include whole-wheat bread, wheat pasta, brown rice, and whole-grain cereals such as oatmeal     Eat a variety of protein foods such as seafood (fish and shellfish), lean meat, and poultry without skin (turkey and chicken)  Examples of lean meats include pork leg, shoulder, or tenderloin, and beef round, sirloin, tenderloin, and extra lean ground beef  Other protein foods include eggs and egg substitutes, beans, peas, soy products, nuts, and seeds  Choose low-fat dairy products such as skim or 1% milk or low-fat yogurt, cheese, and cottage cheese  Limit unhealthy fats  such as butter, hard margarine, and shortening  Exercise:  Exercise at least 30 minutes per day on most days of the week  Some examples of exercise include walking, biking, dancing, and swimming  You can also fit in more physical activity by taking the stairs instead of the elevator or parking farther away from stores  Include muscle strengthening activities 2 days each week  Regular exercise provides many health benefits   It helps you manage your weight, and decreases your risk for type 2 diabetes, heart disease, stroke, and high blood pressure  Exercise can also help improve your mood  Ask your healthcare provider about the best exercise plan for you  General health and safety guidelines:   Do not smoke  Nicotine and other chemicals in cigarettes and cigars can cause lung damage  Ask your healthcare provider for information if you currently smoke and need help to quit  E-cigarettes or smokeless tobacco still contain nicotine  Talk to your healthcare provider before you use these products  Limit alcohol  A drink of alcohol is 12 ounces of beer, 5 ounces of wine, or 1½ ounces of liquor  Lose weight, if needed  Being overweight increases your risk of certain health conditions  These include heart disease, high blood pressure, type 2 diabetes, and certain types of cancer  Protect your skin  Do not sunbathe or use tanning beds  Use sunscreen with a SPF 15 or higher  Apply sunscreen at least 15 minutes before you go outside  Reapply sunscreen every 2 hours  Wear protective clothing, hats, and sunglasses when you are outside  Drive safely  Always wear your seatbelt  Make sure everyone in your car wears a seatbelt  A seatbelt can save your life if you are in an accident  Do not use your cell phone when you are driving  This could distract you and cause an accident  Pull over if you need to make a call or send a text message  Practice safe sex  Use latex condoms if are sexually active and have more than one partner  Your healthcare provider may recommend screening tests for sexually transmitted infections (STIs)  Wear helmets, lifejackets, and protective gear  Always wear a helmet when you ride a bike or motorcycle, go skiing, or play sports that could cause a head injury  Wear protective equipment when you play sports  Wear a lifejacket when you are on a boat or doing water sports      © Copyright [employer] 2022 Information is for End User's use only and may not be sold, redistributed or otherwise used for commercial purposes  All illustrations and images included in CareNotes® are the copyrighted property of A D A M , Inc  or Eloy Medina  The above information is an  only  It is not intended as medical advice for individual conditions or treatments  Talk to your doctor, nurse or pharmacist before following any medical regimen to see if it is safe and effective for you

## 2022-08-30 NOTE — PROGRESS NOTES
ADULT ANNUAL PHYSICAL   [address]    NAME: Pelon Childs  AGE: 61 y o  SEX: male  : 1965     DATE: 2022     Assessment and Plan:     Problem List Items Addressed This Visit        Endocrine    Acquired hypothyroidism (Chronic)     Stable on levothyroxine  Repeat TSH in 6 months  Relevant Orders    TSH, 3rd generation       Respiratory    Chronic rhinitis     Stable  Continue Astelin nasal spray  Relevant Medications    azelastine (ASTELIN) 0 1 % nasal spray       Other    Hyperlipidemia (Chronic)     Continue statin and lovaza  Monitor lipids  Has underlying CAD  Relevant Orders    Lipid panel    CBC    Comprehensive metabolic panel    Depression, recurrent ([address] )     Stable and was following with Dr Alec Carbajal  Will take over prescribing Wellbutrin XL  Relevant Medications    buPROPion (WELLBUTRIN XL) 150 mg 24 hr tablet      Other Visit Diagnoses     Adult general medical examination    -  Primary        Immunizations and preventive care screenings were discussed with patient today  Appropriate education was printed on patient's after visit summary  Counseling:  Alcohol/drug use: discussed moderation in alcohol intake, the recommendations for healthy alcohol use, and avoidance of illicit drug use  Dental Health: discussed importance of regular tooth brushing, flossing, and dental visits  Injury prevention: discussed safety/seat belts, safety helmets, smoke detectors, carbon dioxide detectors, and smoking near bedding or upholstery  Sexual health: discussed sexually transmitted diseases, partner selection, use of condoms, avoidance of unintended pregnancy, and contraceptive alternatives  · Exercise: the importance of regular exercise/physical activity was discussed  Recommend exercise 3-5 times per week for at least 30 minutes        Chief Complaint:     Chief Complaint   Patient presents with   Jeannine Prieto Physical Exam      History of Present Illness:     Adult Annual Physical   Patient here for a comprehensive physical exam  The patient reports no current problems  Was treated for diverticulitis back in July  Did well with antibiotics  Depression Screening  PHQ-2/9 Depression Screening    Little interest or pleasure in doing things: 0 - not at all  Feeling down, depressed, or hopeless: 0 - not at all  Trouble falling or staying asleep, or sleeping too much: 0 - not at all  Feeling tired or having little energy: 0 - not at all  Poor appetite or overeatin - not at all  Feeling bad about yourself - or that you are a failure or have let yourself or your family down: 0 - not at all  Trouble concentrating on things, such as reading the newspaper or watching television: 0 - not at all  Moving or speaking so slowly that other people could have noticed  Or the opposite - being so fidgety or restless that you have been moving around a lot more than usual: 0 - not at all  Thoughts that you would be better off dead, or of hurting yourself in some way: 0 - not at all  PHQ-2 Score: 0  PHQ-2 Interpretation: Negative depression screen  PHQ-9 Score: 0   PHQ-9 Interpretation: No or Minimal depression         Review of Systems:     Review of Systems   Constitutional: Negative for appetite change, chills, fatigue and fever  HENT: Negative for congestion, hearing loss, postnasal drip, rhinorrhea, sore throat, tinnitus and trouble swallowing  Eyes: Negative for pain, discharge, redness and visual disturbance  Respiratory: Negative for cough, chest tightness, shortness of breath and wheezing  Cardiovascular: Negative for chest pain, palpitations and leg swelling  Gastrointestinal: Negative for abdominal distention, abdominal pain, blood in stool, constipation, diarrhea, nausea and vomiting  Endocrine: Negative for cold intolerance, heat intolerance, polydipsia, polyphagia and polyuria     Genitourinary: Negative for difficulty urinating, dysuria, frequency, hematuria and urgency  Musculoskeletal: Negative for arthralgias, back pain, gait problem, joint swelling, myalgias, neck pain and neck stiffness  Skin: Negative for color change and rash  Neurological: Negative for dizziness, tremors, seizures, syncope, speech difficulty, weakness, light-headedness, numbness and headaches  Hematological: Negative for adenopathy  Does not bruise/bleed easily  Psychiatric/Behavioral: Negative for agitation, behavioral problems, confusion, hallucinations, sleep disturbance and suicidal ideas  The patient is not nervous/anxious         Past Medical History:     Past Medical History:   Diagnosis Date    Coronary artery disease     CPAP (continuous positive airway pressure) dependence     History of heart attack     Hyperlipidemia     Hypertension     Sleep apnea     Thyroid disease       Past Surgical History:     Past Surgical History:   Procedure Laterality Date    CORONARY ARTERY BYPASS GRAFT      CORONARY STENT PLACEMENT        Family History:     Family History   Problem Relation Age of Onset    Ovarian cancer Mother     Hypertension Mother     Blindness Mother     Heart attack Father     Prostate cancer Neg Hx     Colon cancer Neg Hx       Social History:     Social History     Socioeconomic History    Marital status:      Spouse name: None    Number of children: None    Years of education: None    Highest education level: None   Occupational History    None   Tobacco Use    Smoking status: Never Smoker    Smokeless tobacco: Never Used   Vaping Use    Vaping Use: Never used   Substance and Sexual Activity    Alcohol use: Yes     Comment: socially    Drug use: Never    Sexual activity: None   Other Topics Concern    None   Social History Narrative    None     Social Determinants of Health     Financial Resource Strain: Not on file   Food Insecurity: Not on file   Transportation Needs: Not on file Physical Activity: Not on file   Stress: Not on file   Social Connections: Not on file   Intimate Partner Violence: Not on file   Housing Stability: Not on file      Current Medications:     Medication Sig Dispense Refill    aspirin 81 mg chewable tablet Chew 81 mg daily      atorvastatin (LIPITOR) 80 mg tablet Take 1 tablet (80 mg total) by mouth daily 90 tablet 1    azelastine (ASTELIN) 0 1 % nasal spray 1-2 sprays into each nostril 2 (two) times a day 90 mL 1    buPROPion (WELLBUTRIN XL) 150 mg 24 hr tablet Take 1 tablet (150 mg total) by mouth every morning 90 tablet 1    famotidine (PEPCID) 40 MG tablet Take 1 tablet (40 mg total) by mouth daily 90 tablet 1    levothyroxine 100 mcg tablet Take 1 tablet (100 mcg total) by mouth daily 90 tablet 1    lisinopril (ZESTRIL) 10 mg tablet Take 1 tablet (10 mg total) by mouth daily 90 tablet 1    metoprolol succinate (TOPROL-XL) 25 mg 24 hr tablet Take 1 tablet (25 mg total) by mouth daily 90 tablet 1    omega-3-acid ethyl esters (LOVAZA) 1 g capsule Take 2 capsules (2 g total) by mouth 2 (two) times a day 360 capsule 1      Allergies:     No Known Allergies      Physical Exam:     /70   Pulse 66   Temp 97 5 °F (36 4 °C)   Ht 5' 8" (1 727 m)   Wt 84 kg (185 lb 3 2 oz)   SpO2 99%   BMI 28 16 kg/m²     Physical Exam  Constitutional:       General: He is not in acute distress  Appearance: He is not ill-appearing  HENT:      Right Ear: Tympanic membrane, ear canal and external ear normal  There is no impacted cerumen  Left Ear: Tympanic membrane, ear canal and external ear normal  There is no impacted cerumen  Nose: Nose normal  No congestion or rhinorrhea  Mouth/Throat:      Mouth: Mucous membranes are moist       Pharynx: No oropharyngeal exudate or posterior oropharyngeal erythema  Cardiovascular:      Rate and Rhythm: Normal rate and regular rhythm  Heart sounds: No murmur heard    Pulmonary:      Effort: Pulmonary effort is normal  No respiratory distress  Breath sounds: No wheezing  Abdominal:      General: Bowel sounds are normal  There is no distension  Tenderness: There is no abdominal tenderness  Musculoskeletal:      Right lower leg: No edema  Left lower leg: No edema  Neurological:      General: No focal deficit present  Mental Status: He is alert  Mental status is at baseline          Barry Sahu DO  [facility]

## 2022-09-07 DIAGNOSIS — F33.9 DEPRESSION, RECURRENT (HCC): ICD-10-CM

## 2022-09-07 DIAGNOSIS — R12 HEARTBURN: ICD-10-CM

## 2022-09-07 DIAGNOSIS — E03.9 ACQUIRED HYPOTHYROIDISM: ICD-10-CM

## 2022-09-07 DIAGNOSIS — I25.10 CORONARY ARTERY DISEASE INVOLVING NATIVE CORONARY ARTERY OF NATIVE HEART WITHOUT ANGINA PECTORIS: ICD-10-CM

## 2022-09-07 DIAGNOSIS — E78.49 OTHER HYPERLIPIDEMIA: ICD-10-CM

## 2022-09-07 DIAGNOSIS — J31.0 CHRONIC RHINITIS: ICD-10-CM

## 2022-09-07 RX ORDER — BUPROPION HYDROCHLORIDE 150 MG/1
150 TABLET ORAL EVERY MORNING
Qty: 90 TABLET | Refills: 0 | Status: SHIPPED | OUTPATIENT
Start: 2022-09-07

## 2022-09-07 RX ORDER — METOPROLOL SUCCINATE 25 MG/1
25 TABLET, EXTENDED RELEASE ORAL DAILY
Qty: 90 TABLET | Refills: 0 | Status: SHIPPED | OUTPATIENT
Start: 2022-09-07

## 2022-09-07 RX ORDER — AZELASTINE 1 MG/ML
1-2 SPRAY, METERED NASAL 2 TIMES DAILY
Qty: 90 ML | Refills: 0 | Status: SHIPPED | OUTPATIENT
Start: 2022-09-07

## 2022-09-07 RX ORDER — LISINOPRIL 10 MG/1
10 TABLET ORAL DAILY
Qty: 90 TABLET | Refills: 0 | Status: SHIPPED | OUTPATIENT
Start: 2022-09-07

## 2022-09-07 RX ORDER — FAMOTIDINE 40 MG/1
40 TABLET, FILM COATED ORAL DAILY
Qty: 90 TABLET | Refills: 0 | Status: SHIPPED | OUTPATIENT
Start: 2022-09-07 | End: 2023-03-06

## 2022-09-07 RX ORDER — LEVOTHYROXINE SODIUM 0.1 MG/1
100 TABLET ORAL DAILY
Qty: 90 TABLET | Refills: 0 | Status: SHIPPED | OUTPATIENT
Start: 2022-09-07

## 2022-09-07 RX ORDER — OMEGA-3-ACID ETHYL ESTERS 1 G/1
2 CAPSULE, LIQUID FILLED ORAL 2 TIMES DAILY
Qty: 360 CAPSULE | Refills: 0 | Status: SHIPPED | OUTPATIENT
Start: 2022-09-07 | End: 2023-03-06

## 2022-09-07 RX ORDER — ATORVASTATIN CALCIUM 80 MG/1
80 TABLET, FILM COATED ORAL DAILY
Qty: 90 TABLET | Refills: 0 | Status: SHIPPED | OUTPATIENT
Start: 2022-09-07

## 2022-09-07 NOTE — TELEPHONE ENCOUNTER
Medication Refill Request     Name atorvastatin (LIPITOR) 80 mg tablet  Dose/Frequency Take 1 tablet (80 mg total) by mouth daily  Quantity 90 tablet  Verified pharmacy   [x]  Verified ordering Provider   [x]  Does patient have enough for the next 3 days? Yes [] No [] unknown    Medication Refill Request     Name  azelastine (ASTELIN) 0 1 % nasal spray  Dose/Frequency 1-2 sprays into each nostril 2 (two) times a day  Quantity 90 mL  Verified pharmacy   [x]  Verified ordering Provider   [x]  Does patient have enough for the next 3 days? Yes [] No [] unknown    Medication Refill Request     Name  buPROPion (WELLBUTRIN XL) 150 mg 24 hr tablet  Dose/Frequency Take 1 tablet (150 mg total) by mouth every morning  Quantity 90 tablet  Verified pharmacy   [x]  Verified ordering Provider   [x]  Does patient have enough for the next 3 days? Yes [] No [] unknown    Medication Refill Request     Name  famotidine (PEPCID) 40 MG tablet  Dose/Frequency Take 1 tablet (40 mg total) by mouth daily  Quantity 90 tablet  Verified pharmacy   [x]  Verified ordering Provider   [x]  Does patient have enough for the next 3 days? Yes [] No [] unknown    Medication Refill Request     Name  levothyroxine 100 mcg tablet  Dose/Frequency Take 1 tablet (100 mcg total) by mouth daily  Quantity 90 tablet  Verified pharmacy   [x]  Verified ordering Provider   [x]  Does patient have enough for the next 3 days? Yes [] No [] unknown    Medication Refill Request     Name  lisinopril (ZESTRIL) 10 mg tablet  Dose/Frequency Take 1 tablet (10 mg total) by mouth daily  Quantity 90 tablet  Verified pharmacy   [x]  Verified ordering Provider   [x]  Does patient have enough for the next 3 days?  Yes [] No [] unknown    Medication Refill Request     Name  metoprolol succinate (TOPROL-XL) 25 mg 24 hr tablet  Dose/Frequency Take 1 tablet (25 mg total) by mouth daily  Quantity 90 tablet  Verified pharmacy   [x]  Verified ordering Provider   [x]  Does patient have enough for the next 3 days? Yes [] No [] unknown    Medication Refill Request     Name  omega-3-acid ethyl esters (LOVAZA) 1 g capsule  Dose/Frequency Take 2 capsules (2 g total) by mouth 2 (two) times a day  Quantity 360 capsule  Verified pharmacy   [x]  Verified ordering Provider   [x]  Does patient have enough for the next 3 days? Yes [] No [] unknown    Patient was unable to tell me how many of each script he had left  He advised the scripts must all go to the [place] [id number] [address] due to insurance

## 2022-11-09 ENCOUNTER — OFFICE VISIT (OUTPATIENT)
Dept: CARDIOLOGY CLINIC | Facility: CLINIC | Age: 57
End: 2022-11-09

## 2022-11-09 VITALS
OXYGEN SATURATION: 97 % | HEIGHT: 68 IN | DIASTOLIC BLOOD PRESSURE: 80 MMHG | HEART RATE: 61 BPM | RESPIRATION RATE: 16 BRPM | BODY MASS INDEX: 28.04 KG/M2 | SYSTOLIC BLOOD PRESSURE: 116 MMHG | WEIGHT: 185 LBS

## 2022-11-09 DIAGNOSIS — I25.10 CORONARY ARTERY DISEASE INVOLVING NATIVE HEART WITHOUT ANGINA PECTORIS, UNSPECIFIED VESSEL OR LESION TYPE: Primary | ICD-10-CM

## 2022-11-09 NOTE — PROGRESS NOTES
Cardiology Follow Up    Thuy Pelletier  1965  [id number]  [facility] [address] [place] [id number] [phone]    1  Coronary artery disease involving native heart without angina pectoris, unspecified vessel or lesion type          Interval History:  59-year-old man who suddenly felt “very unwell in the chest” and was seen in [facility] for myocardial infarction at which time he had stents in the circumflex and right coronary arteries  Follow-up echo suggests inferolateral hypokinesis  His LDL is 69  His HDL he said is low  He takes Lopressor, Lipitor, aspirin, and lisinopril  He said his blood pressure is good at home  He walks frequently including up and down hills and has no exertionally related discomfort      Patient Active Problem List   Diagnosis   • Coronary artery disease   • Hyperlipidemia   • Acquired hypothyroidism   • DAVID on CPAP   • Personal history of colonic polyps   • Chronic rhinitis   • Depression, recurrent ([facility] [address] )     Past Medical History:   Diagnosis Date   • Coronary artery disease    • CPAP (continuous positive airway pressure) dependence    • History of heart attack    • Hyperlipidemia    • Hypertension    • Sleep apnea    • Thyroid disease      Social History     Socioeconomic History   • Marital status:      Spouse name: Not on file   • Number of children: Not on file   • Years of education: Not on file   • Highest education level: Not on file   Occupational History   • Not on file   Tobacco Use   • Smoking status: Never Smoker   • Smokeless tobacco: Never Used   Vaping Use   • Vaping Use: Never used   Substance and Sexual Activity   • Alcohol use: Yes     Comment: socially   • Drug use: Never   • Sexual activity: Not on file   Other Topics Concern   • Not on file   Social History Narrative   • Not on file     Social Determinants of Health     Financial Resource Strain: Not on file   Food Insecurity: Not on file   Transportation Needs: Not on file   Physical Activity: Not on file   Stress: Not on file   Social Connections: Not on file   Intimate Partner Violence: Not on file   Housing Stability: Not on file      Family History   Problem Relation Age of Onset   • Ovarian cancer Mother    • Hypertension Mother    • Blindness Mother    • Heart attack Father    • Prostate cancer Neg Hx    • Colon cancer Neg Hx      Past Surgical History:   Procedure Laterality Date   • CORONARY ARTERY BYPASS GRAFT     • CORONARY STENT PLACEMENT         Current Outpatient Medications:   •  aspirin 81 mg chewable tablet, Chew 81 mg daily, Disp: , Rfl:   •  atorvastatin (LIPITOR) 80 mg tablet, Take 1 tablet (80 mg total) by mouth daily, Disp: 90 tablet, Rfl: 0  •  azelastine (ASTELIN) 0 1 % nasal spray, 1-2 sprays into each nostril 2 (two) times a day, Disp: 90 mL, Rfl: 0  •  buPROPion (WELLBUTRIN XL) 150 mg 24 hr tablet, Take 1 tablet (150 mg total) by mouth every morning, Disp: 90 tablet, Rfl: 0  •  famotidine (PEPCID) 40 MG tablet, Take 1 tablet (40 mg total) by mouth daily, Disp: 90 tablet, Rfl: 0  •  levothyroxine 100 mcg tablet, Take 1 tablet (100 mcg total) by mouth daily, Disp: 90 tablet, Rfl: 0  •  lisinopril (ZESTRIL) 10 mg tablet, Take 1 tablet (10 mg total) by mouth daily, Disp: 90 tablet, Rfl: 0  •  metoprolol succinate (TOPROL-XL) 25 mg 24 hr tablet, Take 1 tablet (25 mg total) by mouth daily, Disp: 90 tablet, Rfl: 0  •  omega-3-acid ethyl esters (LOVAZA) 1 g capsule, Take 2 capsules (2 g total) by mouth 2 (two) times a day, Disp: 360 capsule, Rfl: 0  No Known Allergies    Labs:  No visits with results within 2 Month(s) from this visit  Latest known visit with results is:   Orders Only on 03/07/2022   Component Date Value   • HEP C AB 03/07/2022 NEGATIVE      Imaging: No results found  Review of Systems:  Review of Systems    Physical Exam:  116/80    Heart rate 61 and regular  Skin warm dry  Pupils equal   Carotids 2+ without bruits  Lungs clear  Rhythm regular  No murmurs or gallops  No organomegaly  Good pulses  No edema  Discussion/Summary:    1  Coronary artery disease without angina pectoris and  2  History of hypertension  3  Hyperlipidemia    Recommendations:    1  Patient to have recheck of lipid status  2  Return in 1 year or unless symptoms occur sooner        Shari Medrano MD

## 2022-12-05 DIAGNOSIS — E03.9 ACQUIRED HYPOTHYROIDISM: ICD-10-CM

## 2022-12-05 DIAGNOSIS — E78.49 OTHER HYPERLIPIDEMIA: ICD-10-CM

## 2022-12-06 DIAGNOSIS — F33.9 DEPRESSION, RECURRENT (HCC): ICD-10-CM

## 2022-12-06 DIAGNOSIS — E78.49 OTHER HYPERLIPIDEMIA: ICD-10-CM

## 2022-12-06 DIAGNOSIS — I25.10 CORONARY ARTERY DISEASE INVOLVING NATIVE CORONARY ARTERY OF NATIVE HEART WITHOUT ANGINA PECTORIS: ICD-10-CM

## 2022-12-06 RX ORDER — LEVOTHYROXINE SODIUM 0.1 MG/1
TABLET ORAL
Qty: 90 TABLET | Refills: 0 | Status: SHIPPED | OUTPATIENT
Start: 2022-12-06

## 2022-12-06 RX ORDER — ATORVASTATIN CALCIUM 80 MG/1
TABLET, FILM COATED ORAL
Qty: 90 TABLET | Refills: 0 | Status: SHIPPED | OUTPATIENT
Start: 2022-12-06

## 2022-12-06 RX ORDER — BUPROPION HYDROCHLORIDE 150 MG/1
TABLET ORAL
Qty: 90 TABLET | Refills: 0 | Status: SHIPPED | OUTPATIENT
Start: 2022-12-06

## 2022-12-06 RX ORDER — LISINOPRIL 10 MG/1
TABLET ORAL
Qty: 90 TABLET | Refills: 0 | Status: SHIPPED | OUTPATIENT
Start: 2022-12-06

## 2022-12-06 RX ORDER — OMEGA-3-ACID ETHYL ESTERS 1 G/1
CAPSULE, LIQUID FILLED ORAL
Qty: 360 CAPSULE | Refills: 0 | Status: SHIPPED | OUTPATIENT
Start: 2022-12-06

## 2022-12-06 RX ORDER — METOPROLOL SUCCINATE 25 MG/1
TABLET, EXTENDED RELEASE ORAL
Qty: 90 TABLET | Refills: 0 | Status: SHIPPED | OUTPATIENT
Start: 2022-12-06

## 2023-01-31 ENCOUNTER — OFFICE VISIT (OUTPATIENT)
Dept: INTERNAL MEDICINE CLINIC | Facility: CLINIC | Age: 58
End: 2023-01-31

## 2023-01-31 VITALS
SYSTOLIC BLOOD PRESSURE: 124 MMHG | WEIGHT: 187.8 LBS | BODY MASS INDEX: 28.46 KG/M2 | HEART RATE: 80 BPM | HEIGHT: 68 IN | DIASTOLIC BLOOD PRESSURE: 80 MMHG

## 2023-01-31 DIAGNOSIS — K21.9 LARYNGOPHARYNGEAL REFLUX (LPR): Primary | ICD-10-CM

## 2023-01-31 RX ORDER — PANTOPRAZOLE SODIUM 40 MG/1
40 TABLET, DELAYED RELEASE ORAL
Qty: 90 TABLET | Refills: 1 | Status: SHIPPED | OUTPATIENT
Start: 2023-01-31

## 2023-01-31 RX ORDER — FAMOTIDINE 40 MG/1
40 TABLET, FILM COATED ORAL
Qty: 90 TABLET | Refills: 1 | Status: SHIPPED | OUTPATIENT
Start: 2023-01-31 | End: 2023-07-30

## 2023-01-31 NOTE — PROGRESS NOTES
Name: Jose Rivera      : 1965      MRN: [id number]  Encounter Provider: Luis Antonio Sherwood DO  Encounter Date: 2023   Encounter department: [facility]   [address]     1  Laryngopharyngeal reflux (LPR)  -     pantoprazole (PROTONIX) 40 mg tablet; Take 1 tablet (40 mg total) by mouth daily before breakfast  -     famotidine (PEPCID) 40 MG tablet; Take 1 tablet (40 mg total) by mouth daily at bedtime    Continue pepcid  Add on protonix  If still having symptoms, may have to consider endoscopy for further evaluation  Subjective      Patient with concerns over reflux  Has been using pepcid and at first it was working good and noticed that his throat felt better and voice wasn't being affected  But now pepcid doesn't seem to be working as well anymore  Notices worsening symptoms when lying flat  No pain  No epigastric pain  No nausea or vomiting  No belching or burping  Review of Systems   Constitutional: Negative  HENT:        Burning sensation in throat; voice problems; clearing throat   Respiratory: Negative  Cardiovascular: Negative  Gastrointestinal: Negative  Objective     /80   Pulse 80   Ht 5' 8" (1 727 m)   Wt 85 2 kg (187 lb 12 8 oz)   BMI 28 55 kg/m²     Physical Exam  Constitutional:       General: He is not in acute distress  Appearance: He is not ill-appearing  HENT:      Mouth/Throat:      Mouth: Mucous membranes are moist       Pharynx: No oropharyngeal exudate or posterior oropharyngeal erythema  Cardiovascular:      Rate and Rhythm: Normal rate and regular rhythm  Heart sounds: No murmur heard  Pulmonary:      Effort: Pulmonary effort is normal  No respiratory distress  Breath sounds: No wheezing  Neurological:      Mental Status: He is alert         Luis Antonio Sherwood DO

## 2023-02-24 ENCOUNTER — OFFICE VISIT (OUTPATIENT)
Dept: GASTROENTEROLOGY | Facility: CLINIC | Age: 58
End: 2023-02-24

## 2023-02-24 VITALS
BODY MASS INDEX: 28.61 KG/M2 | OXYGEN SATURATION: 98 % | WEIGHT: 188.8 LBS | RESPIRATION RATE: 18 BRPM | HEART RATE: 82 BPM | HEIGHT: 68 IN | DIASTOLIC BLOOD PRESSURE: 82 MMHG | SYSTOLIC BLOOD PRESSURE: 124 MMHG

## 2023-02-24 DIAGNOSIS — K21.9 GASTROESOPHAGEAL REFLUX DISEASE, UNSPECIFIED WHETHER ESOPHAGITIS PRESENT: ICD-10-CM

## 2023-02-24 DIAGNOSIS — R13.19 ESOPHAGEAL DYSPHAGIA: Primary | ICD-10-CM

## 2023-02-24 NOTE — PATIENT INSTRUCTIONS
Scheduled date of EGD(as of today): 4/26/23  Physician performing EGD: Jong Camarena  Location of EGD: [place]  Instructions reviewed with patient by: Muna RODRIGUEZ  Clearances:

## 2023-02-24 NOTE — LETTER
February 24, 2023     Genesis Denny, DO  [address] [facility] [address]    Patient: Leo Mota   YOB: 1965   Date of Visit: 2/24/2023       Dear Dr Villegas Ill:    Thank you for referring Leo Mota to me for evaluation  Below are my notes for this consultation  If you have questions, please do not hesitate to call me  I look forward to following your patient along with you  Sincerely,        Chelsea Carter MD        CC: No Recipients  Chelsea Carter MD  2/24/2023  8:16 AM  Incomplete  [facility]    Dear Lamonte Rojo,    I had the pleasure of seeing your patient Leo Mota in the office today and I thank you for this kind referral        Chief Complaint: Throat discomfort      HPI:  Leo Mota is a 60 y o  male who presents with rather unusual symptomatology  The patient says that when he talks on the phone for a long time or if he is laying down talking on the phone he begins to feel like he is drowning  He has a lot of fluid in the back of his throat  It is sometimes acidic  He has occasional postnasal drip  He coughs and feels choking  He has sleep apnea  He occasionally feels difficulty swallowing dried carrots and is stopped eating these  However he has no odynophagia nausea or vomiting  No consistent GERD symptomatology  No other apparent exacerbating or remitting factors  He denies any change in the consistency of his skin  He has no Raynaud's  He denies any weight loss melena or hematochezia  He has no chest pain  He does get occasional exertional dyspnea because of a previous heart attack  CBC and complete metabolic profile on March 7 were normal   He has been on Pepcid and Protonix since January 30 but feels they are not doing anything         Review of Systems:   Constitutional: No fever or chills, feels well, no tiredness, no recent weight gain or weight loss     HENT: No complaints of earache, no hearing loss, no nosebleeds, no nasal discharge, no sore throat, no hoarseness  Eyes: No complaints of eye pain, no red eyes, no discharge from eyes, no itchy eyes  Cardiovascular: No complaints of slow heart rate, no fast heart rate, no chest pain, no palpitations, no leg claudication, no lower extremity edema  Respiratory: No complaints of shortness of breath, no wheezing, no cough, positive SOB on exertion, no orthopnea  Gastrointestinal: As noted in HPI  Genitourinary: No complaints of dysuria, no incontinence, no hesitancy, no nocturia  Musculoskeletal: No complaints of arthralgia, no myalgias, no joint swelling or stiffness, no limb pain or swelling  Neurological: No complaints of headache, no confusion, no convulsions, no numbness or tingling, no dizziness or fainting, no limb weakness, no difficulty walking  Skin: No complaints of skin rash or skin lesions, no itching, no skin wound, no dry skin  Hematological/Lymphatic: No complaints of swollen glands, does not bleed easy  Allergic/Immunologic: No immunocompromised state  Endocrine:  No complaints of polyuria, no polydipsia  Psychiatric/Behavioral: is not suicidal, no sleep disturbances, no anxiety or depression, no change in personality, no emotional problems         Historical Information   Past Medical History:   Diagnosis Date   • Coronary artery disease    • CPAP (continuous positive airway pressure) dependence    • History of heart attack    • Hyperlipidemia    • Hypertension    • Sleep apnea    • Thyroid disease      Past Surgical History:   Procedure Laterality Date   • CORONARY ARTERY BYPASS GRAFT     • CORONARY STENT PLACEMENT       Social History   Social History     Substance and Sexual Activity   Alcohol Use Yes    Comment: socially     Social History     Substance and Sexual Activity   Drug Use Never     Social History     Tobacco Use   Smoking Status Never   Smokeless Tobacco Never     Family History   Problem Relation Age of Onset   • Ovarian cancer Mother    • Hypertension Mother    • Blindness Mother    • Heart attack Father    • Prostate cancer Neg Hx    • Colon cancer Neg Hx          Current Medications: has a current medication list which includes the following prescription(s): aspirin, atorvastatin, azelastine, bupropion, famotidine, levothyroxine, lisinopril, metoprolol succinate, omega-3-acid ethyl esters, and pantoprazole  Vital Signs: /82   Pulse 82   Resp 18   Ht 5' 8" (1 727 m)   Wt 85 6 kg (188 lb 12 8 oz)   SpO2 98%   BMI 28 71 kg/m²     Physical Exam:   Constitutional  General Appearance: No acute distress, well appearing and well nourished  Head  Normocephalic  Eyes  Conjunctivae and lids: No swelling, erythema, or discharge  Pupils and irises: Equal, round and reactive to light  Ears, Nose, Mouth, and Throat  External inspection of ears and nose: Normal  Nasal mucosa, septum and turbinates: Normal without edema or erythema/   Oropharynx: Normal with no erythema, edema, exudate or lesions  Neck  Normal range of motion  Neck supple  Cardiovascular  Auscultation of the heart: Normal rate and rhythm, normal S1 and S2 without murmurs  Examination of the extremities for edema and/or varicosities: Normal  Pulmonary/Chest  Respiratory effort: No increased work of breathing or signs of respiratory distress  Auscultation of lungs: Clear to auscultation, equal breath sounds bilaterally, no wheezes, rales, no rhonchi  Abdomen  Abdomen: Non-tender, no masses  Liver and spleen: No hepatomegaly or splenomegaly  Musculoskeletal  Gait and station: normal   Digits and Nails: normal without clubbing or cyanosis  Inspection/palpation of joints, bones, and muscles: Normal  Neurological  No nystagmus or asterixis  Skin  Skin and subcutaneous tissue: Normal without rashes or lesions  Lymphatic  Palpation of the lymph nodes in neck: No lymphadenopathy     Psychiatric  Orientation to person, place and time: Normal   Mood and affect: Normal          Labs:   No results found for: ALT, AST, BUN, CALCIUM, CL, CHOL, CO2, CREATININE, GFRAA, GFRNONAA, HDL, HCT, HGB, HGBA1C, LDL, MG, PHOS, PLT, K, PSA, NA, TRIG, WBC      X-Rays & Procedures:   No orders to display         ______________________________________________________________________      Assessment & Plan:      Diagnoses and all orders for this visit:    Esophageal dysphagia  -     EGD; Future    Gastroesophageal reflux disease, unspecified whether esophagitis present  -     EGD; Future      The patient will be scheduled for an EGD  In the meanwhile he will continue his Protonix and Pepcid  Because of the nature of his symptoms and the coughing and hypopharyngeal discomfort I have advised that he see an ENT physician  I will be happy to inform you of his results and further recommendations  I would like to thank you for allowing me to participate in his care            With warmest regards,    Promise Palma MD, [facility]

## 2023-02-24 NOTE — PROGRESS NOTES
[facility]    Dear Veda Nyhan,    I had the pleasure of seeing your patient Kalyan Washington in the office today and I thank you for this kind referral        Chief Complaint: Throat discomfort      HPI:  Kalyan Washington is a 60 y o  male who presents with rather unusual symptomatology  The patient says that when he talks on the phone for a long time or if he is laying down talking on the phone he begins to feel like he is drowning  He has a lot of fluid in the back of his throat  It is sometimes acidic  He has occasional postnasal drip  He coughs and feels choking  He has sleep apnea  He occasionally feels difficulty swallowing dried carrots and is stopped eating these  However he has no odynophagia nausea or vomiting  No consistent GERD symptomatology  No other apparent exacerbating or remitting factors  He denies any change in the consistency of his skin  He has no Raynaud's  He denies any weight loss melena or hematochezia  He has no chest pain  He does get occasional exertional dyspnea because of a previous heart attack  CBC and complete metabolic profile on March 7 were normal   He has been on Pepcid and Protonix since January 30 but feels they are not doing anything         Review of Systems:   Constitutional: No fever or chills, feels well, no tiredness, no recent weight gain or weight loss  HENT: No complaints of earache, no hearing loss, no nosebleeds, no nasal discharge, no sore throat, no hoarseness  Eyes: No complaints of eye pain, no red eyes, no discharge from eyes, no itchy eyes  Cardiovascular: No complaints of slow heart rate, no fast heart rate, no chest pain, no palpitations, no leg claudication, no lower extremity edema  Respiratory: No complaints of shortness of breath, no wheezing, no cough, positive SOB on exertion, no orthopnea     Gastrointestinal: As noted in HPI  Genitourinary: No complaints of dysuria, no incontinence, no hesitancy, no nocturia  Musculoskeletal: No complaints of arthralgia, no myalgias, no joint swelling or stiffness, no limb pain or swelling  Neurological: No complaints of headache, no confusion, no convulsions, no numbness or tingling, no dizziness or fainting, no limb weakness, no difficulty walking  Skin: No complaints of skin rash or skin lesions, no itching, no skin wound, no dry skin  Hematological/Lymphatic: No complaints of swollen glands, does not bleed easy  Allergic/Immunologic: No immunocompromised state  Endocrine:  No complaints of polyuria, no polydipsia  Psychiatric/Behavioral: is not suicidal, no sleep disturbances, no anxiety or depression, no change in personality, no emotional problems  Historical Information   Past Medical History:   Diagnosis Date   • Coronary artery disease    • CPAP (continuous positive airway pressure) dependence    • History of heart attack    • Hyperlipidemia    • Hypertension    • Sleep apnea    • Thyroid disease      Past Surgical History:   Procedure Laterality Date   • CORONARY ARTERY BYPASS GRAFT     • CORONARY STENT PLACEMENT       Social History   Social History     Substance and Sexual Activity   Alcohol Use Yes    Comment: socially     Social History     Substance and Sexual Activity   Drug Use Never     Social History     Tobacco Use   Smoking Status Never   Smokeless Tobacco Never     Family History   Problem Relation Age of Onset   • Ovarian cancer Mother    • Hypertension Mother    • Blindness Mother    • Heart attack Father    • Prostate cancer Neg Hx    • Colon cancer Neg Hx          Current Medications: has a current medication list which includes the following prescription(s): aspirin, atorvastatin, azelastine, bupropion, famotidine, levothyroxine, lisinopril, metoprolol succinate, omega-3-acid ethyl esters, and pantoprazole         Vital Signs: /82   Pulse 82   Resp 18   Ht 5' 8" (1 727 m)   Wt 85 6 kg (188 lb 12 8 oz)   SpO2 98%   BMI 28 71 kg/m²     Physical Exam:   Constitutional  General Appearance: No acute distress, well appearing and well nourished  Head  Normocephalic  Eyes  Conjunctivae and lids: No swelling, erythema, or discharge  Pupils and irises: Equal, round and reactive to light  Ears, Nose, Mouth, and Throat  External inspection of ears and nose: Normal  Nasal mucosa, septum and turbinates: Normal without edema or erythema/   Oropharynx: Normal with no erythema, edema, exudate or lesions  Neck  Normal range of motion  Neck supple  Cardiovascular  Auscultation of the heart: Normal rate and rhythm, normal S1 and S2 without murmurs  Examination of the extremities for edema and/or varicosities: Normal  Pulmonary/Chest  Respiratory effort: No increased work of breathing or signs of respiratory distress  Auscultation of lungs: Clear to auscultation, equal breath sounds bilaterally, no wheezes, rales, no rhonchi  Abdomen  Abdomen: Non-tender, no masses  Liver and spleen: No hepatomegaly or splenomegaly  Musculoskeletal  Gait and station: normal   Digits and Nails: normal without clubbing or cyanosis  Inspection/palpation of joints, bones, and muscles: Normal  Neurological  No nystagmus or asterixis  Skin  Skin and subcutaneous tissue: Normal without rashes or lesions  Lymphatic  Palpation of the lymph nodes in neck: No lymphadenopathy  Psychiatric  Orientation to person, place and time: Normal   Mood and affect: Normal          Labs:   No results found for: ALT, AST, BUN, CALCIUM, CL, CHOL, CO2, CREATININE, GFRAA, GFRNONAA, HDL, HCT, HGB, HGBA1C, LDL, MG, PHOS, PLT, K, PSA, NA, TRIG, WBC      X-Rays & Procedures:   No orders to display         ______________________________________________________________________      Assessment & Plan:      Diagnoses and all orders for this visit:    Esophageal dysphagia  -     EGD;  Future    Gastroesophageal reflux disease, unspecified whether esophagitis present  -     EGD; Future      The patient will be scheduled for an EGD  In the meanwhile he will continue his Protonix and Pepcid  Because of the nature of his symptoms and the coughing and hypopharyngeal discomfort I have advised that he see an ENT physician  I will be happy to inform you of his results and further recommendations  I would like to thank you for allowing me to participate in his care            With warmest regards,    Ayden Irwin MD, [facility]

## 2023-02-27 DIAGNOSIS — E03.9 ACQUIRED HYPOTHYROIDISM: ICD-10-CM

## 2023-02-28 ENCOUNTER — OFFICE VISIT (OUTPATIENT)
Dept: INTERNAL MEDICINE CLINIC | Facility: CLINIC | Age: 58
End: 2023-02-28

## 2023-02-28 VITALS
DIASTOLIC BLOOD PRESSURE: 80 MMHG | WEIGHT: 188 LBS | SYSTOLIC BLOOD PRESSURE: 118 MMHG | HEIGHT: 68 IN | OXYGEN SATURATION: 98 % | HEART RATE: 64 BPM | BODY MASS INDEX: 28.49 KG/M2 | TEMPERATURE: 95.9 F | RESPIRATION RATE: 20 BRPM

## 2023-02-28 DIAGNOSIS — R13.19 ESOPHAGEAL DYSPHAGIA: ICD-10-CM

## 2023-02-28 DIAGNOSIS — E03.9 ACQUIRED HYPOTHYROIDISM: Chronic | ICD-10-CM

## 2023-02-28 DIAGNOSIS — Z23 ENCOUNTER FOR IMMUNIZATION: ICD-10-CM

## 2023-02-28 DIAGNOSIS — F33.9 DEPRESSION, RECURRENT (HCC): Primary | ICD-10-CM

## 2023-02-28 DIAGNOSIS — E78.49 OTHER HYPERLIPIDEMIA: Chronic | ICD-10-CM

## 2023-02-28 RX ORDER — ZOSTER VACCINE RECOMBINANT, ADJUVANTED 50 MCG/0.5
0.5 KIT INTRAMUSCULAR ONCE
Qty: 1 EACH | Refills: 1 | Status: SHIPPED | OUTPATIENT
Start: 2023-02-28 | End: 2023-02-28

## 2023-02-28 RX ORDER — LEVOTHYROXINE SODIUM 0.1 MG/1
TABLET ORAL
Qty: 90 TABLET | Refills: 1 | Status: SHIPPED | OUTPATIENT
Start: 2023-02-28

## 2023-02-28 NOTE — PROGRESS NOTES
Name: Audrey Whalen      : 1965      MRN: [id number]  Encounter Provider: Richard Freedman DO  Encounter Date: 2023   Encounter department: [facility] [address]     1  Depression, recurrent ([address] )    Stable  Continue medications as prescribed  2  Other hyperlipidemia    Cholesterol controlled  Heart healthy diet  Continue statin and lovaza as prescribed  - Lipid Panel with Direct LDL reflex; Future  - Basic metabolic panel; Future  - CBC; Future    3  Acquired hypothyroidism    Will monitor TSH for now and keep levothyroxine at same dose     - TSH, 3rd generation with Free T4 reflex; Future    4  Encounter for immunization  - Zoster Vac Recomb Adjuvanted (Shingrix) 50 MCG/0 5ML SUSR; Inject 0 5 mL into a muscle once for 1 dose Repeat dose in 2 to 6 months  Dispense: 1 each; Refill: 1    5  Esophageal dysphagia  - Ambulatory Referral to Otolaryngology; Future          Return in about 6 months (around 2023) for Follow-up  William Mccarty presents for follow-up  Labs stable for the most part  Slight increase in TSH  Doesn't believe he missed any doses  Also his TGs were a little high this time  Saw Dr Kavon Crews for esophagal dysphagia and will be doing an endoscopy but also recommend he see ENT  Review of Systems   Constitutional: Negative  Respiratory: Negative  Cardiovascular: Negative  Gastrointestinal: Negative for diarrhea, nausea and vomiting  Heartburn   Musculoskeletal: Negative  Objective     /80 (BP Location: Left arm, Patient Position: Sitting, Cuff Size: Standard)   Pulse 64   Temp (!) 95 9 °F (35 5 °C) (Temporal)   Resp 20   Ht 5' 8" (1 727 m)   Wt 85 3 kg (188 lb)   SpO2 98%   BMI 28 59 kg/m²     Physical Exam  Constitutional:       General: He is not in acute distress  Appearance: He is well-developed  He is not diaphoretic  Neck:      Thyroid: No thyromegaly  Vascular: No JVD  Cardiovascular:      Rate and Rhythm: Normal rate and regular rhythm  Heart sounds: Normal heart sounds  No murmur heard  Pulmonary:      Effort: Pulmonary effort is normal  No respiratory distress  Breath sounds: Normal breath sounds  No wheezing or rales  Abdominal:      General: Bowel sounds are normal  There is no distension  Palpations: Abdomen is soft  There is no mass  Tenderness: There is no abdominal tenderness  There is no guarding or rebound  Musculoskeletal:      Right lower leg: No edema  Left lower leg: No edema  Neurological:      Mental Status: He is alert         Ugo Hernandez DO

## 2023-03-03 DIAGNOSIS — I25.10 CORONARY ARTERY DISEASE INVOLVING NATIVE CORONARY ARTERY OF NATIVE HEART WITHOUT ANGINA PECTORIS: ICD-10-CM

## 2023-03-03 DIAGNOSIS — E78.49 OTHER HYPERLIPIDEMIA: ICD-10-CM

## 2023-03-03 DIAGNOSIS — F33.9 DEPRESSION, RECURRENT (HCC): ICD-10-CM

## 2023-03-03 RX ORDER — ATORVASTATIN CALCIUM 80 MG/1
TABLET, FILM COATED ORAL
Qty: 90 TABLET | Refills: 1 | Status: SHIPPED | OUTPATIENT
Start: 2023-03-03

## 2023-03-03 RX ORDER — BUPROPION HYDROCHLORIDE 150 MG/1
TABLET ORAL
Qty: 90 TABLET | Refills: 0 | Status: SHIPPED | OUTPATIENT
Start: 2023-03-03

## 2023-03-03 RX ORDER — METOPROLOL SUCCINATE 25 MG/1
TABLET, EXTENDED RELEASE ORAL
Qty: 90 TABLET | Refills: 0 | Status: SHIPPED | OUTPATIENT
Start: 2023-03-03

## 2023-03-03 RX ORDER — OMEGA-3-ACID ETHYL ESTERS 1 G/1
CAPSULE, LIQUID FILLED ORAL
Qty: 360 CAPSULE | Refills: 0 | Status: SHIPPED | OUTPATIENT
Start: 2023-03-03

## 2023-03-03 RX ORDER — LISINOPRIL 10 MG/1
TABLET ORAL
Qty: 90 TABLET | Refills: 1 | Status: SHIPPED | OUTPATIENT
Start: 2023-03-03

## 2023-03-15 DIAGNOSIS — J31.0 CHRONIC RHINITIS: ICD-10-CM

## 2023-03-15 RX ORDER — AZELASTINE 1 MG/ML
SPRAY, METERED NASAL
Qty: 90 ML | Refills: 0 | Status: SHIPPED | OUTPATIENT
Start: 2023-03-15

## 2023-04-26 ENCOUNTER — ANESTHESIA EVENT (OUTPATIENT)
Dept: GASTROENTEROLOGY | Facility: HOSPITAL | Age: 58
End: 2023-04-26

## 2023-04-26 ENCOUNTER — ANESTHESIA (OUTPATIENT)
Dept: GASTROENTEROLOGY | Facility: HOSPITAL | Age: 58
End: 2023-04-26

## 2023-04-26 ENCOUNTER — HOSPITAL ENCOUNTER (OUTPATIENT)
Dept: GASTROENTEROLOGY | Facility: HOSPITAL | Age: 58
Setting detail: OUTPATIENT SURGERY
Discharge: HOME | End: 2023-04-26
Attending: INTERNAL MEDICINE | Admitting: INTERNAL MEDICINE

## 2023-04-26 VITALS
TEMPERATURE: 98.1 F | DIASTOLIC BLOOD PRESSURE: 70 MMHG | HEART RATE: 58 BPM | OXYGEN SATURATION: 99 % | RESPIRATION RATE: 20 BRPM | BODY MASS INDEX: 28.4 KG/M2 | HEIGHT: 68 IN | WEIGHT: 187.4 LBS | SYSTOLIC BLOOD PRESSURE: 123 MMHG

## 2023-04-26 DIAGNOSIS — K21.9 GASTROESOPHAGEAL REFLUX DISEASE, UNSPECIFIED WHETHER ESOPHAGITIS PRESENT: ICD-10-CM

## 2023-04-26 DIAGNOSIS — R13.19 ESOPHAGEAL DYSPHAGIA: ICD-10-CM

## 2023-04-26 RX ORDER — SODIUM CHLORIDE, SODIUM LACTATE, POTASSIUM CHLORIDE, CALCIUM CHLORIDE 600; 310; 30; 20 MG/100ML; MG/100ML; MG/100ML; MG/100ML
INJECTION, SOLUTION INTRAVENOUS CONTINUOUS PRN
Status: DISCONTINUED | OUTPATIENT
Start: 2023-04-26 | End: 2023-04-26

## 2023-04-26 RX ORDER — PROPOFOL 10 MG/ML
INJECTION, EMULSION INTRAVENOUS AS NEEDED
Status: DISCONTINUED | OUTPATIENT
Start: 2023-04-26 | End: 2023-04-26

## 2023-04-26 RX ORDER — LIDOCAINE HYDROCHLORIDE 20 MG/ML
INJECTION, SOLUTION EPIDURAL; INFILTRATION; INTRACAUDAL; PERINEURAL AS NEEDED
Status: DISCONTINUED | OUTPATIENT
Start: 2023-04-26 | End: 2023-04-26

## 2023-04-26 RX ADMIN — SODIUM CHLORIDE, SODIUM LACTATE, POTASSIUM CHLORIDE, AND CALCIUM CHLORIDE: .6; .31; .03; .02 INJECTION, SOLUTION INTRAVENOUS at 10:37

## 2023-04-26 RX ADMIN — PROPOFOL 120 MG: 10 INJECTION, EMULSION INTRAVENOUS at 10:44

## 2023-04-26 RX ADMIN — LIDOCAINE HYDROCHLORIDE 100 MG: 20 INJECTION, SOLUTION EPIDURAL; INFILTRATION; INTRACAUDAL; PERINEURAL at 10:44

## 2023-04-26 NOTE — H&P
History and Physical -  [facility]  Crystal Teixeira 60 y o  male MRN: [id number]                  HPI: Crystal Teixeira is a 60y o  year old male who presents for EGD for GERD and dysphagia      REVIEW OF SYSTEMS: Per the HPI, and otherwise unremarkable  Historical Information   Past Medical History:   Diagnosis Date   • Coronary artery disease    • CPAP (continuous positive airway pressure) dependence    • History of heart attack    • Hyperlipidemia    • Hypertension    • Sleep apnea    • Thyroid disease      Past Surgical History:   Procedure Laterality Date   • ADENOIDECTOMY     • CORONARY ARTERY BYPASS GRAFT     • CORONARY STENT PLACEMENT     • TONSILLECTOMY       Social History   Social History     Substance and Sexual Activity   Alcohol Use Yes    Comment: socially     Social History     Substance and Sexual Activity   Drug Use Never     Social History     Tobacco Use   Smoking Status Never   Smokeless Tobacco Never     Family History   Problem Relation Age of Onset   • Ovarian cancer Mother    • Hypertension Mother    • Blindness Mother    • Heart attack Father    • Prostate cancer Neg Hx    • Colon cancer Neg Hx        Meds/Allergies     (Not in a hospital admission)      No Known Allergies    Objective     There were no vitals taken for this visit        PHYSICAL EXAM    Gen: NAD  CV: RRR  CHEST: Clear  ABD: soft, NT/ND  EXT: no edema  Neuro: AAO      ASSESSMENT/PLAN:  This is a 60y o  year old male here for GERD, dysphagia    PLAN:   Procedure: EGD

## 2023-04-26 NOTE — INTERVAL H&P NOTE
H&P reviewed  After examining the patient I find no changes in the patients condition since the H&P had been written      Vitals:    04/26/23 0946   BP: 108/77   Pulse: 59   Resp: 18   Temp: 98 °F (36 7 °C)   SpO2: 98%

## 2023-04-26 NOTE — ANESTHESIA PREPROCEDURE EVALUATION
Procedure:  EGD    Relevant Problems   CARDIO   (+) Coronary artery disease   (+) Hyperlipidemia      ENDO   (+) Acquired hypothyroidism      NEURO/PSYCH   (+) Depression, recurrent (HCC)   (+) Personal history of colonic polyps      PULMONARY   (+) DAVID on CPAP      HTN    Physical Exam    Airway    Mallampati score: II  TM Distance: >3 FB  Neck ROM: full     Dental   No notable dental hx     Cardiovascular      Pulmonary      Other Findings        Anesthesia Plan  ASA Score- 2     Anesthesia Type- IV sedation with anesthesia with ASA Monitors  Additional Monitors:   Airway Plan:           Plan Factors-    Chart reviewed  Patient summary reviewed  Induction- intravenous  Postoperative Plan-     Informed Consent- Anesthetic plan and risks discussed with patient  I personally reviewed this patient with the CRNA  Discussed and agreed on the Anesthesia Plan with the CRNA  Candida Hy

## 2023-04-26 NOTE — ANESTHESIA POSTPROCEDURE EVALUATION
Post-Op Assessment Note    CV Status:  Stable    Pain management: adequate     Mental Status:  Alert and awake   Hydration Status:  Euvolemic   PONV Controlled:  Controlled   Airway Patency:  Patent   Two or more mitigation strategies used for obstructive sleep apnea   Post Op Vitals Reviewed: Yes      Staff: CRNA         No notable events documented      BP   105/51   Temp   98   Pulse  57   Resp   15   SpO2   94

## 2023-05-06 DIAGNOSIS — E78.49 OTHER HYPERLIPIDEMIA: ICD-10-CM

## 2023-05-06 RX ORDER — OMEGA-3-ACID ETHYL ESTERS 1 G/1
CAPSULE, LIQUID FILLED ORAL
Qty: 360 CAPSULE | Refills: 0 | Status: SHIPPED | OUTPATIENT
Start: 2023-05-06

## 2023-05-23 ENCOUNTER — TELEPHONE (OUTPATIENT)
Dept: OTHER | Facility: OTHER | Age: 58
End: 2023-05-23

## 2023-05-25 NOTE — TELEPHONE ENCOUNTER
Pt called in stating he never received a call with his results  He's requesting a call back at [phone]

## 2023-05-26 ENCOUNTER — OFFICE VISIT (OUTPATIENT)
Dept: GASTROENTEROLOGY | Facility: CLINIC | Age: 58
End: 2023-05-26

## 2023-05-26 VITALS
HEART RATE: 71 BPM | WEIGHT: 182.8 LBS | OXYGEN SATURATION: 96 % | BODY MASS INDEX: 27.71 KG/M2 | DIASTOLIC BLOOD PRESSURE: 78 MMHG | SYSTOLIC BLOOD PRESSURE: 124 MMHG | HEIGHT: 68 IN | RESPIRATION RATE: 16 BRPM

## 2023-05-26 DIAGNOSIS — R09.89 GLOBUS SENSATION: ICD-10-CM

## 2023-05-26 DIAGNOSIS — K57.92 DIVERTICULITIS: Primary | ICD-10-CM

## 2023-05-26 NOTE — PROGRESS NOTES
[facility]      Chief Complaint: Acute diverticulitis    HPI:  Doug Garcia is a 60 y o   male who presents with 2 separate GI issues  The first is history of Schatzki ring with dilation  However the patient has been having a sensation in the back of his throat which sounds like globus  He went to an ENT but was told he had to see another ENT in the same office  He has a lot of issues with the back of his throat at this time  He is not having any dysphagia  He is taking daily pantoprazole  He is not having any GERD or heartburn  He recently had an acute diverticulitis attack  This was his second attack  It was treated successfully with Cipro and Flagyl  He has no abdominal pain at the present time  He is having significant postnasal drip         Review of Systems:   Constitutional: No fever or chills, feels well, no tiredness, no recent weight gain or weight loss  HENT: As per HPI  Eyes: No complaints of eye pain, no red eyes, no discharge from eyes, no itchy eyes  Cardiovascular: No complaints of slow heart rate, no fast heart rate, no chest pain, no palpitations, no leg claudication, no lower extremity edema  Respiratory: No complaints of shortness of breath, no wheezing, no cough, no SOB on exertion, no orthopnea  Gastrointestinal: As noted in HPI  Genitourinary: No complaints of dysuria, no incontinence, no hesitancy, no nocturia  Musculoskeletal: No complaints of arthralgia, no myalgias, no joint swelling or stiffness, no limb pain or swelling  Neurological: No complaints of headache, no confusion, no convulsions, no numbness or tingling, no dizziness or fainting, no limb weakness, no difficulty walking  Skin: No complaints of skin rash or skin lesions, no itching, no skin wound, no dry skin  Hematological/Lymphatic: No complaints of swollen glands, does not bleed easy  Allergic/Immunologic: No immunocompromised state    Endocrine:  No complaints of "polyuria, no polydipsia  Psychiatric/Behavioral: is not suicidal, no sleep disturbances, no anxiety or depression, no change in personality, no emotional problems  Historical Information   Past Medical History:   Diagnosis Date   • Coronary artery disease    • CPAP (continuous positive airway pressure) dependence    • History of heart attack    • Hyperlipidemia    • Hypertension    • Myocardial infarction [facility])    • Sleep apnea    • Thyroid disease      Past Surgical History:   Procedure Laterality Date   • ADENOIDECTOMY     • CORONARY ARTERY BYPASS GRAFT     • CORONARY STENT PLACEMENT     • TONSILLECTOMY       Social History   Social History     Substance and Sexual Activity   Alcohol Use Yes    Comment: socially     Social History     Substance and Sexual Activity   Drug Use Never     Social History     Tobacco Use   Smoking Status Never   Smokeless Tobacco Never     Family History   Problem Relation Age of Onset   • Ovarian cancer Mother    • Hypertension Mother    • Blindness Mother    • Heart attack Father    • Prostate cancer Neg Hx    • Colon cancer Neg Hx          Current Medications: has a current medication list which includes the following prescription(s): aspirin, atorvastatin, azelastine, bupropion, famotidine, levothyroxine, lisinopril, metoprolol succinate, omega-3-acid ethyl esters, and pantoprazole  Vital Signs: /78   Pulse 71   Resp 16   Ht 5' 8\" (1 727 m)   Wt 82 9 kg (182 lb 12 8 oz)   SpO2 96%   BMI 27 79 kg/m²       Physical Exam:   Constitutional  General Appearance: No acute distress, well appearing and well nourished  Head  Normocephalic  Eyes  Conjunctivae and lids: No swelling, erythema, or discharge  Pupils and irises: Equal, round and reactive to light     Ears, Nose, Mouth, and Throat  External inspection of ears and nose: Normal  Nasal mucosa, septum and turbinates: Normal without edema or erythema/   Oropharynx: Normal with no erythema, edema, exudate or " "lesions  Neck  Normal range of motion  Neck supple  Cardiovascular  Auscultation of the heart: Normal rate and rhythm, normal S1 and S2 without murmurs  Examination of the extremities for edema and/or varicosities: Normal  Pulmonary/Chest  Respiratory effort: No increased work of breathing or signs of respiratory distress  Auscultation of lungs: Clear to auscultation, equal breath sounds bilaterally, no wheezes, rales, no rhonchi  Abdomen  Abdomen: Non-tender, no masses  Liver and spleen: No hepatomegaly or splenomegaly  Musculoskeletal  Gait and station: normal   Digits and Nails: normal without clubbing or cyanosis  Inspection/palpation of joints, bones, and muscles: Normal  Neurological  No nystagmus or asterixis  Skin  Skin and subcutaneous tissue: Normal without rashes or lesions  Lymphatic  Palpation of the lymph nodes in neck: No lymphadenopathy  Psychiatric  Orientation to person, place and time: Normal   Mood and affect: Normal          Labs:  No results found for: \"ALT\", \"AST\", \"BUN\", \"CALCIUM\", \"CHOL\", \"CL\", \"CO2\", \"CREATININE\", \"GFRAA\", \"GFRNONAA\", \"HCT\", \"HDL\", \"HGB\", \"HGBA1C\", \"K\", \"LDL\", \"MG\", \"NA\", \"PHOS\", \"PLT\", \"PSA\", \"TRIG\", \"WBC\"      X-Rays & Procedures:   No orders to display           ______________________________________________________________________      Assessment & Plan:     Diagnoses and all orders for this visit:    Diverticulitis    Globus sensation      Patient is advised to avoid seeds and nuts  He will call if he has a recurrent attack of diverticulitis  We did discuss the possibility of surgical referral   He is not having any dysphagia  He may cut back on the PPI to every other day    He will follow-up with ENT as he was advised for further work-up of his throat issue  He will see me again in 1 year and follow-up  "

## 2023-06-03 DIAGNOSIS — I25.10 CORONARY ARTERY DISEASE INVOLVING NATIVE CORONARY ARTERY OF NATIVE HEART WITHOUT ANGINA PECTORIS: ICD-10-CM

## 2023-06-03 DIAGNOSIS — E03.9 ACQUIRED HYPOTHYROIDISM: ICD-10-CM

## 2023-06-03 DIAGNOSIS — F33.9 DEPRESSION, RECURRENT (HCC): ICD-10-CM

## 2023-06-03 RX ORDER — BUPROPION HYDROCHLORIDE 150 MG/1
TABLET ORAL
Qty: 90 TABLET | Refills: 0 | Status: SHIPPED | OUTPATIENT
Start: 2023-06-03

## 2023-06-03 RX ORDER — METOPROLOL SUCCINATE 25 MG/1
TABLET, EXTENDED RELEASE ORAL
Qty: 90 TABLET | Refills: 0 | Status: SHIPPED | OUTPATIENT
Start: 2023-06-03

## 2023-06-03 RX ORDER — LEVOTHYROXINE SODIUM 0.1 MG/1
TABLET ORAL
Qty: 90 TABLET | Refills: 1 | Status: SHIPPED | OUTPATIENT
Start: 2023-06-03

## 2023-06-05 ENCOUNTER — TELEPHONE (OUTPATIENT)
Dept: GASTROENTEROLOGY | Facility: CLINIC | Age: 58
End: 2023-06-05

## 2023-06-05 DIAGNOSIS — K57.92 DIVERTICULITIS: Primary | ICD-10-CM

## 2023-06-05 RX ORDER — CIPROFLOXACIN 500 MG/1
500 TABLET, FILM COATED ORAL EVERY 12 HOURS SCHEDULED
Qty: 20 TABLET | Refills: 0 | Status: SHIPPED | OUTPATIENT
Start: 2023-06-05 | End: 2023-06-15

## 2023-06-05 RX ORDER — METRONIDAZOLE 500 MG/1
500 TABLET ORAL EVERY 12 HOURS SCHEDULED
Qty: 20 TABLET | Refills: 0 | Status: SHIPPED | OUTPATIENT
Start: 2023-06-05 | End: 2023-06-15

## 2023-06-05 NOTE — TELEPHONE ENCOUNTER
Patients GI provider:  Dr Price Barrientos    Number to return call: [phone]    Reason for call: Patient calling as he has had diverticulitis twice while traveling and was prescribed Cipro and Flagyl by a different provider   Is asking if antibiotics can be prescribed that he can have on hand while traveling if he gets another diverticulitis attack and he is not close to a hospital      Scheduled procedure/appointment date if applicable: N/A

## 2023-06-13 ENCOUNTER — OFFICE VISIT (OUTPATIENT)
Age: 58
End: 2023-06-13

## 2023-06-13 VITALS — BODY MASS INDEX: 27.28 KG/M2 | HEIGHT: 68 IN | WEIGHT: 180 LBS

## 2023-06-13 DIAGNOSIS — D18.01 CHERRY ANGIOMA: ICD-10-CM

## 2023-06-13 DIAGNOSIS — Z13.89 SCREENING FOR SKIN CONDITION: Primary | ICD-10-CM

## 2023-06-13 DIAGNOSIS — L72.9 FOLLICULAR CYST OF SKIN: ICD-10-CM

## 2023-06-13 DIAGNOSIS — D22.9 MULTIPLE NEVI: ICD-10-CM

## 2023-06-13 DIAGNOSIS — L82.1 SEBORRHEIC KERATOSIS: ICD-10-CM

## 2023-06-13 NOTE — PATIENT INSTRUCTIONS
"MELANOCYTIC NEVI (\"Moles\")    Melanocytic nevi (\"moles\") are tan or brown, raised or flat areas of the skin which have an increased number of melanocytes  Melanocytes are the cells in our body which make pigment and account for skin color  Some moles are present at birth (I e , \"congenital nevi\"), while others come up later in life (i e , \"acquired nevi\")  The sun can stimulate the body to make more moles  Sunburns are not the only thing that triggers more moles  Chronic sun exposure can do it too  Clinically distinguishing a healthy mole from melanoma may be difficult, even for experienced dermatologists  The \"ABCDE's\" of moles have been suggested as a means of helping to alert a person to a suspicious mole and the possible increased risk of melanoma  The suggestions for raising alert are as follows:    Asymmetry: Healthy moles tend to be symmetric, while melanomas are often asymmetric  Asymmetry means if you draw a line through the mole, the two halves do not match in color, size, shape, or surface texture  Asymmetry can be a result of rapid enlargement of a mole, the development of a raised area on a previously flat lesion, scaling, ulceration, bleeding or scabbing within the mole  Any mole that starts to demonstrate \"asymmetry\" should be examined promptly by a board certified dermatologist      Border: Healthy moles tend to have discrete, even borders  The border of a melanoma often blends into the normal skin and does not sharply delineate the mole from normal skin  Any mole that starts to demonstrate \"uneven borders\" should be examined promptly by a board certified dermatologist      Color: Healthy moles tend to be one color throughout  Melanomas tend to be made up of different colors ranging from dark black, blue, white, or red    Any mole that demonstrates a color change should be examined promptly by a board certified dermatologist      Diameter: Healthy moles tend to be smaller than 0 6 cm " "in size; an exception are \"congenital nevi\" that can be larger  Melanomas tend to grow and can often be greater than 0 6 cm (1/4 of an inch, or the size of a pencil eraser)  This is only a guideline, and many normal moles may be larger than 0 6 cm without being unhealthy  Any mole that starts to change in size (small to bigger or bigger to smaller) should be examined promptly by a board certified dermatologist      Evolving: Healthy moles tend to \"stay the same  \"  Melanomas may often show signs of change or evolution such as a change in size, shape, color, or elevation  Any mole that starts to itch, bleed, crust, burn, hurt, or ulcerate or demonstrate a change or evolution should be examined promptly by a board certified dermatologist       Dysplastic Nevi  Dysplastic moles are moles that fit the ABCDE rules of melanoma but are not identified as melanomas when examined under the microscope  They may indicate an increased risk of melanoma in that person  If there is a family history of melanoma, most experts agree that the person may be at an increased risk for developing a melanoma  Experts still do not agree on what dysplastic moles mean in patients without a personal or family history of melanoma  Dysplastic moles are usually larger than common moles and have different colors within it with irregular borders  The appearance can be very similar to a melanoma  Biopsies of dysplastic moles may show abnormalities which are different from a regular mole  Melanoma  Malignant melanoma is a type of skin cancer that can be deadly if it spreads throughout the body  The incidence of melanoma in the [place] is growing faster than any other cancer  Melanoma usually grows near the surface of the skin for a period of time, and then begins to grow deeper into the skin  Once it grows deeper into the skin, the risk of spread to other organs greatly increases   Therefore, early detection and removal of a malignant " "melanoma may result in a better chance at a complete cure; removal after the tumor has spread may not be as effective, leading to worse clinical outcomes such as death  The true rate of nevus transformation into a melanoma is unknown  It has been estimated that the lifetime risk for any acquired melanocytic nevus on any 21year-old individual transforming into melanoma by age [de-identified] is 0 03% (1 in 3,164) for men and 0 009% (1 in 10,800) for women  The appearance of a \"new mole\" remains one of the most reliable methods for identifying a malignant melanoma  Occasionally, melanomas appear as rapidly growing, blue-black, dome-shaped bumps within a previous mole or previous area of normal skin  Other times, melanomas are suspected when a mole suddenly appears or changes  Itching, burning, or pain in a pigmented lesion should increase suspicion, but most patients with early melanoma have no skin discomfort whatsoever  Melanoma can occur anywhere on the skin, including areas that are difficult for self-examination  Many melanomas are first noticed by other family members  Suspicious-looking moles may be removed for microscopic examination  You may be able to prevent death from melanoma by doing two simple things:    Try to avoid unnecessary sun exposure and protect your skin when it is exposed to the sun  People who live near the equator, people who have intermittent exposures to large amounts of sun, and people who have had sunburns in childhood or adolescence have an increased risk for melanoma  Sun sense and vigilant sun protection may be keys to helping to prevent melanoma  We recommend wearing UPF-rated sun protective clothing and sunglasses whenever possible and applying a moisturizer-sunscreen combination product (SPF 50+) such as Neutrogena Daily Defense to sun exposed areas of skin at least three times a day      Have your moles regularly examined by a board certified dermatologist AND by yourself or " "a family member/friend at home  We recommend that you have your moles examined at least once a year by a board certified dermatologist   Use your birthday as an annual reminder to have your \"Birthday Suit\" (I e , your skin) examined; it is a nice birthday gift to yourself to know that your skin is healthy appearing! Additionally, at-home self examinations may be helpful for detecting a possible melanoma  Use the ABCDEs we discussed and check your moles once a month at home  SEBORRHEIC KERATOSIS  A seborrheic keratosis is a harmless warty spot that appears during adult life as a common sign of skin aging  Seborrheic keratoses can arise on any area of skin, covered or uncovered, with the usual exception of the palms and soles  They do not arise from mucous membranes  Seborrheic keratoses can have highly variable appearance  Seborrheic keratoses are extremely common  It has been estimated that over 90% of adults over the age of 61 years have one or more of them  They occur in males and females of all races, typically beginning to erupt in the 35s or 45s  They are uncommon under the age of 21 years  The precise cause of seborrhoeic keratoses is not known  Seborrhoeic keratoses are considered degenerative in nature  As time goes by, seborrheic keratoses tend to become more numerous  Some people inherit a tendency to develop a very large number of them; some people may have hundreds of them  The name \"seborrheic keratosis\" is misleading, because these lesions are not limited to a seborrhoeic distribution (scalp, mid-face, chest, upper back), nor are they formed from sebaceous glands, nor are they associated with sebum -- which is greasy  Seborrheic keratosis may also be called \"SK,\" \"Seb K,\" \"basal cell papilloma,\" \"senile wart,\" or \"barnacle  \"      There is no easy way to remove multiple lesions on a single occasion    Unless a specific lesion is \"inflamed\" and is causing pain or stinging/burning or " "is bleeding, most insurance companies do not authorize treatment  ANGIOMA (\"CHERRY ANGIOMA\")  Ramirez angiomas markedly increase in number from about the age of 36, so it has been estimated that 75% of people over 76years of age have them  Although they also called \"senile angiomas,\" they can occur in young people too - 5% of adolescents have been found to have them  Cherry angiomas are very common in males and females of any age or race, with no difference in sexes or races affected  They are however more noticeable in white skin than in skin of colour  There may be a family history of similar lesions  Eruptive (very large number appearing in a short period of time) cherry angiomas have been rarely reported to be associated with internal malignancy and pregnancy  EPIDERMAL INCLUSION CYST    Assessment and Plan:  Based on a thorough discussion of this condition and the management approach to it (including a comprehensive discussion of the known risks, side effects and potential benefits of treatment), the patient (family) agrees to implement the following specific plan:  D/W pt possible excision in the future is lesion becomes bothersome    What are epidermal inclusion cysts? Epidermal inclusion cysts are the most common, benign cutaneous cysts  There are many different names for epidermal inclusion cysts, including epidermoid cyst, epidermal cyst, infundibular cyst, inclusion cyst, and keratin cyst  These cysts can occur anywhere on the body and typically present as nodules directly underneath the skin  There is often a visible pore or opening in the center  The cysts are freely moveable and can range from a few millimeters to several centimeters in diameter  The center of epidermoid cysts almost always contains keratin, which has a cheesy appearance, and not sebum  They also do not originate from sebaceous glands  Therefore, epidermal inclusion cysts are not the same as sebaceous cysts      Cysts may " remain stable or progressively enlarge over time  There are no reliable predictive factors to tell if an epidermal inclusion cyst will enlarge, become inflamed, or remain quiescent  Infected cysts tend to become larger, turn red, and are more noticeable to the patient  There may be accompanying pain and discomfort  What causes epidermal inclusion cysts? Epidermal inclusion cysts often appear out of the blue and are not contagious  They are due to a proliferation of epidermal cells within the dermis and are more common in men than women  They occur more frequently in patients in their 20s to 45s  Epidermal inclusion cysts by themselves are usually not inherited, but they can be hereditary in rare syndromes such as Gonzalez syndrome, nodular elastosis with cysts and comedones (Favre-Racouchot syndrome), and basal cell nevus syndrome (Gorlin syndrome)  Elderly patients with chronic sun-damaged skin areas have a higher likelihood of developing epidermoid cysts  They often occur in areas where hair follicles have been inflamed or repeatedly irritated are more frequent in patients with acne vulgaris  In the  period, they are called milia  Patients on BRAF inhibitors such as imiquimod and cyclosporine have a higher incidence of epidermoid cysts of the face  How do we diagnose an epidermal inclusion cyst?  Epidermoid inclusion cysts are often diagnosed by history and physical exam  There is usually no need for biopsy prior to removal   Radiographic and laboratory exams, such as ultrasound studies, are unnecessary and not typically ordered unless the practitioner suspects a genetic condition  What is the treatment for an epidermal inclusion cyst?  Inflamed, uninfected epidermal inclusion cysts rarely resolve spontaneously without therapy or surgical intervention  Treatment is not emergent unless desired by the patient  Definitive treatment is via surgical excision with walls intact   This method will prevent recurrence  This is best done when the cyst is not inflamed, to decrease the probability of rupture during surgery  A local anesthetic will be injected around the cyst  A small incision is made in the skin overlying the cyst, and contents are expressed  The incision is repaired with sutures    Another option is to use a 4mm punch biopsy with cyst extraction through the defect  Incision and drainage is often needed if the cyst is infected or inflamed  If there is surrounding cellulitis, oral antibiotic therapy may be necessary  The common agents used target methicillin sensitive Staphylococcal aureus and methicillin resistant S aureus in areas of high prevalence

## 2023-06-13 NOTE — PROGRESS NOTES
"[facility]     Patient Name: Codie Jimenez  Encounter Date: June 13, 2023     Have you been cared for by a [place] [address] Dermatologist in the last 3 years and, if so, which description applies to you? NO  I am considered a \"new\" patient and must complete all patient intake questions  I am MALE/not capable of bearing children  REVIEW OF SYSTEMS:  Have you recently had or currently have any of the following? · Recent fever or chills? No  · Any non-healing wound? No   PAST MEDICAL HISTORY:  Have you personally ever had or currently have any of the following? If \"YES,\" then please provide more detail  · Skin cancer (such as Melanoma, Basal Cell Carcinoma, Squamous Cell Carcinoma? No  · Tuberculosis, HIV/AIDS, Hepatitis B or C: No  · Systemic Immunosuppression such as Diabetes, Biologic or Immunotherapy, Chemotherapy, Organ Transplantation, Bone Marrow Transplantation No  · Radiation Treatment No   FAMILY HISTORY:  Any \"first degree relatives\" (parent, brother, sister, or child) with the following? • Skin Cancer, Pancreatic or Other Cancer? YES, Mother- Ovarian Cancer   PATIENT EXPERIENCE:    • Do you want the Dermatologist to perform a COMPLETE skin exam today including a clinical examination under the \"bra and underwear\" areas? Yes  • If necessary, do we have your permission to call and leave a detailed message on your Preferred Phone number that includes your specific medical information?   Yes      No Known Allergies   Current Outpatient Medications:   •  aspirin 81 mg chewable tablet, Chew 81 mg daily, Disp: , Rfl:   •  atorvastatin (LIPITOR) 80 mg tablet, TAKE 1 TABLET(80 MG) BY MOUTH DAILY, Disp: 90 tablet, Rfl: 1  •  azelastine (ASTELIN) 0 1 % nasal spray, USE 1 TO 2 SPRAYS IN EACH NOSTRIL TWICE DAILY, Disp: 90 mL, Rfl: 0  •  buPROPion (WELLBUTRIN XL) 150 mg 24 hr tablet, TAKE 1 TABLET(150 MG) BY MOUTH EVERY MORNING, Disp: 90 tablet, Rfl: 0  •  famotidine (PEPCID) 40 MG tablet, " Take 1 tablet (40 mg total) by mouth daily at bedtime, Disp: 90 tablet, Rfl: 1  •  levothyroxine 100 mcg tablet, TAKE 1 TABLET(100 MCG) BY MOUTH DAILY, Disp: 90 tablet, Rfl: 1  •  lisinopril (ZESTRIL) 10 mg tablet, TAKE 1 TABLET(10 MG) BY MOUTH DAILY, Disp: 90 tablet, Rfl: 1  •  metoprolol succinate (TOPROL-XL) 25 mg 24 hr tablet, TAKE 1 TABLET(25 MG) BY MOUTH DAILY, Disp: 90 tablet, Rfl: 0  •  omega-3-acid ethyl esters (LOVAZA) 1 g capsule, TAKE 2 CAPSULES BY MOUTH TWICE DAILY, Disp: 360 capsule, Rfl: 0  •  pantoprazole (PROTONIX) 40 mg tablet, Take 1 tablet (40 mg total) by mouth daily before breakfast, Disp: 90 tablet, Rfl: 1  •  ciprofloxacin (CIPRO) 500 mg tablet, Take 1 tablet (500 mg total) by mouth every 12 (twelve) hours for 10 days (Patient not taking: Reported on 6/13/2023), Disp: 20 tablet, Rfl: 0  •  metroNIDAZOLE (FLAGYL) 500 mg tablet, Take 1 tablet (500 mg total) by mouth every 12 (twelve) hours for 10 days (Patient not taking: Reported on 6/13/2023), Disp: 20 tablet, Rfl: 0          • Whom besides the patient is providing clinical information about today's encounter?   o NO ADDITIONAL HISTORIAN (patient alone provided history)    Physical Exam and Assessment/Plan by Diagnosis:    EPIDERMAL INCLUSION CYST    Physical Exam:  • Anatomic Location Affected:  Mid Back  • Morphological Description:  7mm Cystic nodule with open pore  • Pertinent Positives:  • Pertinent Negatives: Additional History of Present Condition:  Present on exam    Assessment and Plan:  Based on a thorough discussion of this condition and the management approach to it (including a comprehensive discussion of the known risks, side effects and potential benefits of treatment), the patient (family) agrees to implement the following specific plan:  • D/W pt possible excision in the future is lesion becomes bothersome    What are epidermal inclusion cysts? Epidermal inclusion cysts are the most common, benign cutaneous cysts   There are many different names for epidermal inclusion cysts, including epidermoid cyst, epidermal cyst, infundibular cyst, inclusion cyst, and keratin cyst  These cysts can occur anywhere on the body and typically present as nodules directly underneath the skin  There is often a visible pore or opening in the center  The cysts are freely moveable and can range from a few millimeters to several centimeters in diameter  The center of epidermoid cysts almost always contains keratin, which has a cheesy appearance, and not sebum  They also do not originate from sebaceous glands  Therefore, epidermal inclusion cysts are not the same as sebaceous cysts  Cysts may remain stable or progressively enlarge over time  There are no reliable predictive factors to tell if an epidermal inclusion cyst will enlarge, become inflamed, or remain quiescent  Infected cysts tend to become larger, turn red, and are more noticeable to the patient  There may be accompanying pain and discomfort  What causes epidermal inclusion cysts? Epidermal inclusion cysts often appear out of the blue and are not contagious  They are due to a proliferation of epidermal cells within the dermis and are more common in men than women  They occur more frequently in patients in their 20s to 45s  Epidermal inclusion cysts by themselves are usually not inherited, but they can be hereditary in rare syndromes such as Gonzalez syndrome, nodular elastosis with cysts and comedones (Favre-Racouchot syndrome), and basal cell nevus syndrome (Gorlin syndrome)  Elderly patients with chronic sun-damaged skin areas have a higher likelihood of developing epidermoid cysts  They often occur in areas where hair follicles have been inflamed or repeatedly irritated are more frequent in patients with acne vulgaris  In the  period, they are called milia       Patients on BRAF inhibitors such as imiquimod and cyclosporine have a higher incidence of epidermoid cysts of the "face     How do we diagnose an epidermal inclusion cyst?  Epidermoid inclusion cysts are often diagnosed by history and physical exam  There is usually no need for biopsy prior to removal   Radiographic and laboratory exams, such as ultrasound studies, are unnecessary and not typically ordered unless the practitioner suspects a genetic condition  What is the treatment for an epidermal inclusion cyst?  Inflamed, uninfected epidermal inclusion cysts rarely resolve spontaneously without therapy or surgical intervention  Treatment is not emergent unless desired by the patient  Definitive treatment is via surgical excision with walls intact  This method will prevent recurrence  This is best done when the cyst is not inflamed, to decrease the probability of rupture during surgery  - A local anesthetic will be injected around the cyst  - A small incision is made in the skin overlying the cyst, and contents are expressed  - The incision is repaired with sutures    Another option is to use a 4mm punch biopsy with cyst extraction through the defect  Incision and drainage is often needed if the cyst is infected or inflamed  If there is surrounding cellulitis, oral antibiotic therapy may be necessary  The common agents used target methicillin sensitive Staphylococcal aureus and methicillin resistant S aureus in areas of high prevalence  MELANOCYTIC NEVI (\"Moles\")    Physical Exam:  • Anatomic Location Affected: Mostly on sun-exposed areas of the trunk and extremities  • Morphological Description:  Scattered, 1-4mm round to ovoid, symmetrical-appearing, even bordered, skin colored to dark brown macules/papules, mostly in sun-exposed areas  • Pertinent Positives:  • Pertinent Negatives:     Additional History of Present Condition:  Present on exam    Assessment and Plan:  Based on a thorough discussion of this condition and the management approach to it (including a comprehensive discussion of the known risks, side " "effects and potential benefits of treatment), the patient (family) agrees to implement the following specific plan:  • Provided handout with information regarding the ABCDE's of moles   • Recommend routine skin exams every year   • Sun avoidance, protective clothing (known as UPF clothing), and the use of at least SPF 30 sunscreens is advised  Sunscreen should be reapplied every two hours when outside  SEBORRHEIC KERATOSIS; NON-INFLAMED    Physical Exam:  • Anatomic Location Affected:  scattered across trunk, extremities  • Morphological Description:  Flat and raised, waxy, smooth to warty textured, yellow to brownish-grey to dark brown to blackish, discrete, \"stuck-on\" appearing papules  • Pertinent Positives:  • Pertinent Negatives: Additional History of Present Condition:  Patient reports new bumps on the skin  Denies itch, burn, pain, bleeding or ulceration  Present constantly; nothing seems to make it worse or better  No prior treatment  Assessment and Plan:  Based on a thorough discussion of this condition and the management approach to it (including a comprehensive discussion of the known risks, side effects and potential benefits of treatment), the patient (family) agrees to implement the following specific plan:  • Reassured benign          Scribe Attestation    I,:  Lowell Argueta am acting as a scribe while in the presence of the attending physician :       I,:  Raulito Johnson MD personally performed the services described in this documentation    as scribed in my presence  :           "

## 2023-07-03 ENCOUNTER — NURSE TRIAGE (OUTPATIENT)
Age: 58
End: 2023-07-03

## 2023-07-03 NOTE — TELEPHONE ENCOUNTER
Spoke with patient, he wanted to give Dr. Rosa Baron an updated on his divticultis. He did not use the Antibx that were sent in on 6/6. He thinks he caught it in time, he has been on a clear liquid diet for 48 hours. He has no pain or fevers. He will try a low fiber foods tomorrow.  I schedule follow up OV as well for August.

## 2023-07-11 ENCOUNTER — OFFICE VISIT (OUTPATIENT)
Age: 58
End: 2023-07-11
Payer: COMMERCIAL

## 2023-07-11 VITALS
RESPIRATION RATE: 18 BRPM | WEIGHT: 186 LBS | OXYGEN SATURATION: 97 % | BODY MASS INDEX: 28.19 KG/M2 | DIASTOLIC BLOOD PRESSURE: 82 MMHG | SYSTOLIC BLOOD PRESSURE: 126 MMHG | HEART RATE: 70 BPM | TEMPERATURE: 97.6 F | HEIGHT: 68 IN

## 2023-07-11 DIAGNOSIS — H57.89 EYE SWELLING, LEFT: Primary | ICD-10-CM

## 2023-07-11 PROCEDURE — 99213 OFFICE O/P EST LOW 20 MIN: CPT

## 2023-07-11 NOTE — PROGRESS NOTES
Name: Ronald Oliva      : 1965      MRN: [id number]  Encounter Provider: ZAMZAM Gilbert  Encounter Date: 2023   Encounter department: [address]     1. Eye swelling, left    Denies foreign body sensation, crusting, tearing or change in vision. Conjunctive a normal.  Encourage warm compresses 15 to 20 minutes 3-4 times daily. Thorough hand hygiene. Call the office for worsening symptoms-consider topical antibiotic. Subjective      Eye Pain   The left eye is affected. This is a new problem. The current episode started yesterday. The problem occurs constantly. The problem has been unchanged. The pain is at a severity of 1/10. The patient is experiencing no pain. Pertinent negatives include no eye discharge, eye redness, fever, itching or photophobia. Review of Systems   Constitutional: Negative for chills, fatigue and fever. HENT: Negative. Eyes: Positive for pain. Negative for photophobia, discharge, redness, itching and visual disturbance. Respiratory: Negative. Cardiovascular: Negative. Skin: Negative.         Current Outpatient Medications on File Prior to Visit   Medication Sig   • atorvastatin (LIPITOR) 80 mg tablet TAKE 1 TABLET(80 MG) BY MOUTH DAILY   • azelastine (ASTELIN) 0.1 % nasal spray USE 1 TO 2 SPRAYS IN EACH NOSTRIL TWICE DAILY   • buPROPion (WELLBUTRIN XL) 150 mg 24 hr tablet TAKE 1 TABLET(150 MG) BY MOUTH EVERY MORNING   • famotidine (PEPCID) 40 MG tablet Take 1 tablet (40 mg total) by mouth daily at bedtime   • levothyroxine 100 mcg tablet TAKE 1 TABLET(100 MCG) BY MOUTH DAILY   • lisinopril (ZESTRIL) 10 mg tablet TAKE 1 TABLET(10 MG) BY MOUTH DAILY   • metoprolol succinate (TOPROL-XL) 25 mg 24 hr tablet TAKE 1 TABLET(25 MG) BY MOUTH DAILY   • omega-3-acid ethyl esters (LOVAZA) 1 g capsule TAKE 2 CAPSULES BY MOUTH TWICE DAILY   • pantoprazole (PROTONIX) 40 mg tablet Take 1 tablet (40 mg total) by mouth daily before breakfast   • aspirin 81 mg chewable tablet Chew 81 mg daily       Objective     /82 (BP Location: Right arm, Patient Position: Sitting, Cuff Size: Standard)   Pulse 70   Temp 97.6 °F (36.4 °C) (Temporal)   Resp 18   Ht 5' 8" (1.727 m)   Wt 84.4 kg (186 lb)   SpO2 97%   BMI 28.28 kg/m²     Physical Exam  Vitals and nursing note reviewed. Constitutional:       Appearance: Normal appearance. Eyes:      General: No scleral icterus. Right eye: No discharge. Left eye: No discharge. Conjunctiva/sclera: Conjunctivae normal.        Comments: Stye upper eyelid- left eye   Cardiovascular:      Rate and Rhythm: Normal rate and regular rhythm. Heart sounds: Normal heart sounds. Pulmonary:      Effort: Pulmonary effort is normal.      Breath sounds: Normal breath sounds. Musculoskeletal:      Cervical back: Normal range of motion and neck supple. Neurological:      General: No focal deficit present. Mental Status: He is alert.        [facility], [address] [place] [address]

## 2023-07-12 ENCOUNTER — TELEPHONE (OUTPATIENT)
Age: 58
End: 2023-07-12

## 2023-07-12 DIAGNOSIS — H57.89 EYE SWELLING, LEFT: Primary | ICD-10-CM

## 2023-07-12 RX ORDER — POLYMYXIN B SULFATE AND TRIMETHOPRIM 1; 10000 MG/ML; [USP'U]/ML
1 SOLUTION OPHTHALMIC EVERY 4 HOURS
Qty: 10 ML | Refills: 0 | Status: SHIPPED | OUTPATIENT
Start: 2023-07-12

## 2023-07-17 DIAGNOSIS — I25.10 CORONARY ARTERY DISEASE INVOLVING NATIVE CORONARY ARTERY OF NATIVE HEART WITHOUT ANGINA PECTORIS: ICD-10-CM

## 2023-07-17 RX ORDER — METOPROLOL SUCCINATE 25 MG/1
TABLET, EXTENDED RELEASE ORAL
Qty: 90 TABLET | Refills: 0 | Status: SHIPPED | OUTPATIENT
Start: 2023-07-17

## 2023-07-19 ENCOUNTER — APPOINTMENT (OUTPATIENT)
Dept: LAB | Facility: HOSPITAL | Age: 58
End: 2023-07-19
Payer: COMMERCIAL

## 2023-07-19 DIAGNOSIS — J38.01 PARESIS OF RIGHT VOCAL FOLD: ICD-10-CM

## 2023-07-19 DIAGNOSIS — E78.49 OTHER HYPERLIPIDEMIA: Chronic | ICD-10-CM

## 2023-07-19 DIAGNOSIS — Z91.89 RISK OF EXPOSURE TO LYME DISEASE: ICD-10-CM

## 2023-07-19 DIAGNOSIS — E03.9 ACQUIRED HYPOTHYROIDISM: ICD-10-CM

## 2023-07-19 DIAGNOSIS — K21.9 GASTROESOPHAGEAL REFLUX DISEASE, UNSPECIFIED WHETHER ESOPHAGITIS PRESENT: ICD-10-CM

## 2023-07-19 LAB
ALBUMIN SERPL BCP-MCNC: 4.6 G/DL (ref 3.5–5)
ALP SERPL-CCNC: 58 U/L (ref 34–104)
ALT SERPL W P-5'-P-CCNC: 36 U/L (ref 7–52)
ANION GAP SERPL CALCULATED.3IONS-SCNC: 8 MMOL/L
AST SERPL W P-5'-P-CCNC: 22 U/L (ref 13–39)
B BURGDOR IGG+IGM SER QL IA: NEGATIVE
BILIRUB SERPL-MCNC: 0.26 MG/DL (ref 0.2–1)
BUN SERPL-MCNC: 25 MG/DL (ref 5–25)
CALCIUM SERPL-MCNC: 10.1 MG/DL (ref 8.4–10.2)
CHLORIDE SERPL-SCNC: 100 MMOL/L (ref 96–108)
CHOLEST SERPL-MCNC: 108 MG/DL
CO2 SERPL-SCNC: 25 MMOL/L (ref 21–32)
CREAT SERPL-MCNC: 1.53 MG/DL (ref 0.6–1.3)
ERYTHROCYTE [DISTWIDTH] IN BLOOD BY AUTOMATED COUNT: 12.7 % (ref 11.6–15.1)
GFR SERPL CREATININE-BSD FRML MDRD: 49 ML/MIN/1.73SQ M
GLUCOSE P FAST SERPL-MCNC: 103 MG/DL (ref 65–99)
HCT VFR BLD AUTO: 44.5 % (ref 36.5–49.3)
HDLC SERPL-MCNC: 23 MG/DL
HGB BLD-MCNC: 15.6 G/DL (ref 12–17)
LDLC SERPL CALC-MCNC: 32 MG/DL (ref 0–100)
MCH RBC QN AUTO: 32.2 PG (ref 26.8–34.3)
MCHC RBC AUTO-ENTMCNC: 35.1 G/DL (ref 31.4–37.4)
MCV RBC AUTO: 92 FL (ref 82–98)
PLATELET # BLD AUTO: 276 THOUSANDS/UL (ref 149–390)
PMV BLD AUTO: 10.2 FL (ref 8.9–12.7)
POTASSIUM SERPL-SCNC: 3.9 MMOL/L (ref 3.5–5.3)
PROT SERPL-MCNC: 8 G/DL (ref 6.4–8.4)
RBC # BLD AUTO: 4.85 MILLION/UL (ref 3.88–5.62)
SODIUM SERPL-SCNC: 133 MMOL/L (ref 135–147)
T4 FREE SERPL-MCNC: 0.71 NG/DL (ref 0.61–1.12)
TRIGL SERPL-MCNC: 264 MG/DL
TSH SERPL DL<=0.05 MIU/L-ACNC: 7.23 UIU/ML (ref 0.45–4.5)
WBC # BLD AUTO: 7.22 THOUSAND/UL (ref 4.31–10.16)

## 2023-07-19 PROCEDURE — 80061 LIPID PANEL: CPT

## 2023-07-19 PROCEDURE — 84443 ASSAY THYROID STIM HORMONE: CPT

## 2023-07-19 PROCEDURE — 85027 COMPLETE CBC AUTOMATED: CPT

## 2023-07-19 PROCEDURE — 36415 COLL VENOUS BLD VENIPUNCTURE: CPT

## 2023-07-19 PROCEDURE — 84439 ASSAY OF FREE THYROXINE: CPT

## 2023-07-19 PROCEDURE — 86618 LYME DISEASE ANTIBODY: CPT

## 2023-07-19 PROCEDURE — 80053 COMPREHEN METABOLIC PANEL: CPT

## 2023-07-22 ENCOUNTER — TELEPHONE (OUTPATIENT)
Dept: OTHER | Facility: OTHER | Age: 58
End: 2023-07-22

## 2023-07-22 DIAGNOSIS — E03.9 ACQUIRED HYPOTHYROIDISM: ICD-10-CM

## 2023-07-22 NOTE — TELEPHONE ENCOUNTER
Patient called over the weekend stating that he is unsure why he was never called by Dr. Piper Marie to discuss his abnormal lab results. He is very concerned and upset. Please call patient back as soon as possible to discuss.

## 2023-07-24 RX ORDER — LEVOTHYROXINE SODIUM 0.12 MG/1
125 TABLET ORAL DAILY
Qty: 90 TABLET | Refills: 1 | Status: SHIPPED | OUTPATIENT
Start: 2023-07-24

## 2023-07-24 NOTE — TELEPHONE ENCOUNTER
There were no serious abnormalities that required immediate attention. Generally labs are reviewed at the appointment. Thyroid function is mildly off and can go up on levothyroxine to 125 mcg now. I sent new prescription. Other abnormalities:  · Glucose of 103 is minimally high and not bad enough to be concerned about diabetes. Watch sugar intake  · His creatinine of 1.53 is stable from February 2023 labs where his creatinine was 1.50. This is due to his age and underlying medical problems. · Triglycerides of 264 are not serious.  That can be improved with heart healthy diet and regular physical activity

## 2023-07-25 ENCOUNTER — OFFICE VISIT (OUTPATIENT)
Age: 58
End: 2023-07-25

## 2023-07-25 VITALS — BODY MASS INDEX: 28.19 KG/M2 | HEIGHT: 68 IN | WEIGHT: 186 LBS

## 2023-07-25 DIAGNOSIS — L72.9 FOLLICULAR CYST OF SKIN: Primary | ICD-10-CM

## 2023-07-25 PROCEDURE — 88304 TISSUE EXAM BY PATHOLOGIST: CPT | Performed by: STUDENT IN AN ORGANIZED HEALTH CARE EDUCATION/TRAINING PROGRAM

## 2023-07-25 NOTE — PATIENT INSTRUCTIONS
What You Will Need to Do After the Procedure  Keep the area clean and dry the first day. Try NOT to remove the bandage for the first day. Gently clean the area with soap and water and apply Vaseline ointment (this is over the counter and not a prescription) to the excision  site for up to 2 weeks. Apply a clean appropriately sized bandage to area. Gauze and paper tape are recommended for sensitive skin. Return for suture removal as instructed (generally 1 week for the face, 2 weeks for the body). Take Acetaminophen (Tylenol) for discomfort, if no contraindications. Do NOT take Ibuprofen or aspirin unless specifically told to do so by your Dermatologist because these medications can make bleeding worse. Call our office immediately for signs of infection: fever, chills, increased redness, warmth, tenderness, discomfort/pain, or pus or foul smell coming from the wound. If bleeding is noticed, place a clean cloth over the area and apply firm pressure for thirty minutes. Check the wound ONLY after 30 minutes of direct pressure; do not cheat and sneak a peak, as that does not count. If bleeding persists after 30 minutes of legitimate direct pressure, then try one more round of direct pressure for an additional 10 minutes to the area. Should the bleeding become heavier or not stop after the second attempt, call [facility] directly at [phone] (SKIN) or, if after hours, go to your local Emergency Room/Emergency Department.

## 2023-07-25 NOTE — PROGRESS NOTES
[facility]     Attending:   Assistant: David Trujillo    Pre-Op Diagnosis: follicular cyst  Post-Op Diagnosis: Same   Benign versus Malignant benign      Pre-op size: 7 mm    Written and verbal, witnessed informed consent was obtained. I discussed that excision is a method of removing lesions both benign and malignant lesions. A portion of normal skin is often taken to ensure completeness of removal.  I reviewed that procedure will include numbing the area,  cutting around and under defect, undermining tissue, and closing the wound with sutures both inside and out. These sutures are usually removed in 7 to 14 days. Risks (bleeding, pain, infection, scarring, recurrence) and benefits discussed. It was discussed with patient that every effort is made to minimize scar, but scarring is influenced also by extrinsic factor such as location, age and genetics. Time Out: performed:  yes  Correct patient: yes. Correct site per Clinic Report: yes  Correct site per Patient Report: yes    LOCAL ANESTHESIA: 1% xylocaine with epi     DESCRIPTION OF PROCEDURE: The patient was brought back into the procedure room, anesthetized locally, prepped and draped in the usual fashion. Using a 6mm punch cyst was   excised in total some remnants dissected free with # 11 blade. Epidermal edge closure was accomplished with superficial sutures as follows:    Superficial suture: 4-0 Ethilon  Superficial suture type: 2 Sutures interupted    Estimated blood loss less than 3ml. The patient tolerated the procedure well without any complications. The wound was cleaned with sterile saline, dried off, surgical vaseline ointment was applied, and the wound was covered. A pressure dressing was applied for stabilization and light pressure. The patient was given detailed oral and written instructions on postoperative care as detailed in consent. The patient left in good medical condition.     POSTOP DISCUSSION DISCUSSION AND INSTRUCTIONS FOR PATIENT      Rationale for Procedure  A skin excision allows the dermatologist to remove a lesion. The procedure involves a local numbing medication and removing the entire lesion. Typically, the lesion is being removed because it is atypical, traumatized, or for significant appearance reasons. The area will be open like a brush burn and allowed to heal.   There will be no sutures. Tissue is sent to pathologist who will reconfirm diagnosis and verify completeness of lesion removal.    Description of Procedure  We would like to perform a skin excision today. A local anesthetic, similar to the kind that a dentist uses when filling a cavity, will be injected with a very small needle into the skin area to be sampled. The injected skin and tissue underneath should “go to sleep” and become numbed so that no further pain should be felt. A scalpel will be used to cut around the lesion and tissue will be submitted to pathology for examination. Depending on the diagnosis the lesion will be excised with a certain amount of normal skin to help assure completeness of lesion removal.  The physician will discuss in advance the anticipated size and extent of removal.   Bleeding will occur, but it will stopped with direct pressure, sutures, and electrocautery. Surgical “Vaseline-type” ointment will also applied after the procedure to help create a barrier between the wound and the outside world. Risks and Potential Complications  The advantage of a skin excision is that it allows us to remove a problem lesion quickly. Although this usually permits the lesion to heal as soon as possible with the least scarring, there are some risks and potential complications that include but are not limited to the following:  - Some bleeding is normal at time of procedure and some bleeding on gauze is normal  the first few days after surgery.   Profuse bleeding and bleeding with swelling and pain should be reported as detailed  below  - Infection is uncommon in skin surgery. Infection should be reported and is indicated by pain, redness, and discharge of purulent material.  - Some dull to at time sharp pain could occur initially the day after surgery. Persistent pain or increasing pain days after surgery is not expected and should be reported. - Every effort is made to minimize scar, but location, size, and genetics do play a role in scar appearance. A surgical wound does not achieve its optimal appearance until 6 months. There are several treatments available if scarring would be problematic including scar creams, silicone pad, laser and scar revision.  - Skin discoloration can occur especially in people of color. Its important to avoid sun on wound in first 6 months after surgery. Treatment is available if pigment is problematic.  - Incomplete removal of the lesion or recurrence of lesion can occur and this would then require further treatment and more surgery.  - Nerve Damage/Numbness/Loss of Function is very rare, but is most likely to occur if lesion is large or if it is in a high risk location  - Allergic Reaction to lidocaine is rare. More commonly,  epinephrine is used  with the lidocaine. Occasionally, epinephrine (aka adrenalin) may cause a brief  feeling of anxiety or jitteriness. - The person at the microscope  (pathologist) may provide additional information that was unexpected. This unexpected finding could provoke the need for additional treatment or evaluation.     Scribe Attestation    I,:  Vicente Boo am acting as a scribe while in the presence of the attending physician.:       I,:  Becky Dumont MD personally performed the services described in this documentation    as scribed in my presence.:

## 2023-07-26 DIAGNOSIS — E78.49 OTHER HYPERLIPIDEMIA: ICD-10-CM

## 2023-07-26 RX ORDER — OMEGA-3-ACID ETHYL ESTERS 1 G/1
CAPSULE, LIQUID FILLED ORAL
Qty: 360 CAPSULE | Refills: 0 | Status: SHIPPED | OUTPATIENT
Start: 2023-07-26 | End: 2023-08-02

## 2023-08-01 ENCOUNTER — TELEPHONE (OUTPATIENT)
Age: 58
End: 2023-08-01

## 2023-08-01 DIAGNOSIS — E78.49 OTHER HYPERLIPIDEMIA: ICD-10-CM

## 2023-08-01 PROCEDURE — 88304 TISSUE EXAM BY PATHOLOGIST: CPT | Performed by: STUDENT IN AN ORGANIZED HEALTH CARE EDUCATION/TRAINING PROGRAM

## 2023-08-01 NOTE — TELEPHONE ENCOUNTER
----- Message from Kyle Trevino MD sent at 8/1/2023 10:41 AM EDT -----  Please call him of normal pathology

## 2023-08-02 RX ORDER — OMEGA-3-ACID ETHYL ESTERS 1 G/1
CAPSULE, LIQUID FILLED ORAL
Qty: 360 CAPSULE | Refills: 1 | Status: SHIPPED | OUTPATIENT
Start: 2023-08-02 | End: 2023-09-06 | Stop reason: SDUPTHER

## 2023-08-03 ENCOUNTER — OFFICE VISIT (OUTPATIENT)
Age: 58
End: 2023-08-03
Payer: COMMERCIAL

## 2023-08-03 VITALS
WEIGHT: 186 LBS | DIASTOLIC BLOOD PRESSURE: 80 MMHG | SYSTOLIC BLOOD PRESSURE: 122 MMHG | BODY MASS INDEX: 28.19 KG/M2 | OXYGEN SATURATION: 97 % | HEIGHT: 68 IN | HEART RATE: 73 BPM

## 2023-08-03 DIAGNOSIS — R10.32 LEFT LOWER QUADRANT ABDOMINAL PAIN: Primary | ICD-10-CM

## 2023-08-03 DIAGNOSIS — R14.0 BLOATING: ICD-10-CM

## 2023-08-03 PROCEDURE — 99214 OFFICE O/P EST MOD 30 MIN: CPT | Performed by: INTERNAL MEDICINE

## 2023-08-03 NOTE — PROGRESS NOTES
[facility]      Chief Complaint: Abdominal pain, phlegm in the throat    HPI:  Nena Hamman is a 60 y.o.  male who presents with 2 separate GI issues. First is a history of diverticulitis on multiple occasions. Patient most recently had beginning of a possible attack. He was feeling bloated in the lower abdomen and had some diarrhea. He just went on clear liquids for couple days and it completely disappeared. He never got any abdominal pain. He never had to take antibiotics. He has had 3 documented attacks of diverticulitis but this did not sound like 1. He has past history of Schatzki ring and a lot of phlegm in the throat. We had recommended ENT eval which did find some paralysis of the vocal cord. The patient notes that since going on a gluten-free diet he has had a decrease in the amount of mucus in his throat. He has no weight loss. No fever or chills. No other significant GI issues at this time. .      Review of Systems:   Constitutional: No fever or chills, feels well, no tiredness, no recent weight gain or weight loss. HENT: No complaints of earache, no hearing loss, no nosebleeds, no nasal discharge, no sore throat, no hoarseness. Eyes: No complaints of eye pain, no red eyes, no discharge from eyes, no itchy eyes. Cardiovascular: No complaints of slow heart rate, no fast heart rate, no chest pain, no palpitations, no leg claudication, no lower extremity edema. Respiratory: No complaints of shortness of breath, no wheezing, no cough, no SOB on exertion, no orthopnea. Gastrointestinal: As noted in HPI  Genitourinary: No complaints of dysuria, no incontinence, no hesitancy, no nocturia. Musculoskeletal: No complaints of arthralgia, no myalgias, no joint swelling or stiffness, no limb pain or swelling.    Neurological: No complaints of headache, no confusion, no convulsions, no numbness or tingling, no dizziness or fainting, no limb weakness, no difficulty walking. Skin: No complaints of skin rash or skin lesions, no itching, no skin wound, no dry skin. Hematological/Lymphatic: No complaints of swollen glands, does not bleed easy. Allergic/Immunologic: No immunocompromised state. Endocrine:  No complaints of polyuria, no polydipsia. Psychiatric/Behavioral: is not suicidal, no sleep disturbances, no anxiety or depression, no change in personality, no emotional problems. Historical Information   Past Medical History:   Diagnosis Date   • Coronary artery disease    • CPAP (continuous positive airway pressure) dependence    • History of heart attack    • Hyperlipidemia    • Hypertension    • Myocardial infarction [facility])    • Sleep apnea    • Thyroid disease      Past Surgical History:   Procedure Laterality Date   • ADENOIDECTOMY     • CORONARY ARTERY BYPASS GRAFT     • CORONARY STENT PLACEMENT     • TONSILLECTOMY       Social History   Social History     Substance and Sexual Activity   Alcohol Use Yes    Comment: socially     Social History     Substance and Sexual Activity   Drug Use Never     Social History     Tobacco Use   Smoking Status Never   Smokeless Tobacco Never     Family History   Problem Relation Age of Onset   • Ovarian cancer Mother    • Hypertension Mother    • Blindness Mother    • Heart attack Father    • Prostate cancer Neg Hx    • Colon cancer Neg Hx          Current Medications: has a current medication list which includes the following prescription(s): aspirin, atorvastatin, azelastine, bupropion, levothyroxine, lisinopril, metoprolol succinate, omega-3-acid ethyl esters, pantoprazole, polymyxin b-trimethoprim, and famotidine.         Vital Signs: /80   Pulse 73   Ht 5' 8" (1.727 m)   Wt 84.4 kg (186 lb)   SpO2 97%   BMI 28.28 kg/m²       Physical Exam:   Constitutional  General Appearance: No acute distress, well appearing and well nourished  Head  Normocephalic  Eyes  Conjunctivae and lids: No swelling, erythema, or discharge. Pupils and irises: Equal, round and reactive to light. Ears, Nose, Mouth, and Throat  External inspection of ears and nose: Normal  Nasal mucosa, septum and turbinates: Normal without edema or erythema/   Oropharynx: Normal with no erythema, edema, exudate or lesions. Neck  Normal range of motion. Neck supple. Cardiovascular  Auscultation of the heart: Normal rate and rhythm, normal S1 and S2 without murmurs. Examination of the extremities for edema and/or varicosities: Normal  Pulmonary/Chest  Respiratory effort: No increased work of breathing or signs of respiratory distress. Auscultation of lungs: Clear to auscultation, equal breath sounds bilaterally, no wheezes, rales, no rhonchi. Abdomen  Abdomen: Non-tender, no masses. Liver and spleen: No hepatomegaly or splenomegaly. Musculoskeletal  Gait and station: normal.  Digits and Nails: normal without clubbing or cyanosis. Inspection/palpation of joints, bones, and muscles: Normal  Neurological  No nystagmus or asterixis. Skin  Skin and subcutaneous tissue: Normal without rashes or lesions. Lymphatic  Palpation of the lymph nodes in neck: No lymphadenopathy. Psychiatric  Orientation to person, place and time: Normal.  Mood and affect: Normal.         Labs:  Lab Results   Component Value Date    ALT 36 07/19/2023    AST 22 07/19/2023    BUN 25 07/19/2023    CALCIUM 10.1 07/19/2023     07/19/2023    CO2 25 07/19/2023    CREATININE 1.53 (H) 07/19/2023    HDL 23 (L) 07/19/2023    HCT 44.5 07/19/2023    HGB 15.6 07/19/2023     07/19/2023    K 3.9 07/19/2023    TRIG 264 (H) 07/19/2023    WBC 7.22 07/19/2023         X-Rays & Procedures:   No orders to display           ______________________________________________________________________      Assessment & Plan:     Diagnoses and all orders for this visit:    Left lower quadrant abdominal pain    Bloating  -     Celiac Disease Antibody Profile;  Future      Patient will undergo a celiac panel. Further recommendations will depend on study results. He will continue to avoid gluten since this is helping with his phlegm. He will finish the ENT work-up. He will call if his diverticular disease recurs.

## 2023-08-03 NOTE — PATIENT INSTRUCTIONS
Care Management Discharge Note    Discharge Date: 08/03/2023       Discharge Disposition: Other (Comments) (TBD)    Discharge Services: Home with HC- RN, PT, OT    Discharge DME: None    Discharge Transportation: family or friend will provide    Private pay costs discussed: Not applicable    Does the patient's insurance plan have a 3 day qualifying hospital stay waiver?  No     Education Provided on the Discharge Plan: Yes  Persons Notified of Discharge Plans: patient, [facility]  Patient/Family in Agreement with the Plan:  yes    Handoff Referral Completed: No    Additional Information:  Patient discharging to home with [facility]- RN, PT OT- to start on 8/7/23. Patient informed and orders were faxed to the Home Care agency at [phone].     Jadyn Craig, RN, BSN, CM  Inpatient Care Coordination  [facility]  [phone]             Try Claritin (loratadine)  for one week, apply warm compress for 15-20 minutes 3-4 time per day.   Call the office for redness, change in vision or fever/(worsening symptoms.)

## 2023-08-08 ENCOUNTER — APPOINTMENT (OUTPATIENT)
Age: 58
End: 2023-08-08
Payer: COMMERCIAL

## 2023-08-08 ENCOUNTER — OFFICE VISIT (OUTPATIENT)
Age: 58
End: 2023-08-08

## 2023-08-08 ENCOUNTER — TELEPHONE (OUTPATIENT)
Age: 58
End: 2023-08-08

## 2023-08-08 DIAGNOSIS — Z48.02 ENCOUNTER FOR REMOVAL OF SUTURES: Primary | ICD-10-CM

## 2023-08-08 DIAGNOSIS — R14.0 BLOATING: ICD-10-CM

## 2023-08-08 PROCEDURE — 82784 ASSAY IGA/IGD/IGG/IGM EACH: CPT

## 2023-08-08 PROCEDURE — RECHECK: Performed by: DERMATOLOGY

## 2023-08-08 PROCEDURE — 86364 TISS TRNSGLTMNASE EA IG CLAS: CPT

## 2023-08-08 PROCEDURE — 86258 DGP ANTIBODY EACH IG CLASS: CPT

## 2023-08-08 PROCEDURE — 86231 EMA EACH IG CLASS: CPT

## 2023-08-08 PROCEDURE — 36415 COLL VENOUS BLD VENIPUNCTURE: CPT

## 2023-08-08 NOTE — TELEPHONE ENCOUNTER
Called and left voicemail for pt informing him of a missed suture removal appointment scheduled today at 11:15am. I left both numbers for the derm main number and my direct teams number for pt to call back so we can get him scheduled to have the sutures removed

## 2023-08-08 NOTE — PROGRESS NOTES
Speech-Language Pathology Initial Evaluation    Today's date: 2023   Patient’s name: Dareen Klinefelter  : 1965  MRN: [id number]  Safety measures: hx of heart attack  Referring provider: Mateus Baeaz MD    Encounter Diagnosis     ICD-10-CM    1. 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 Patient reports it seems to be from fluid build-up in the pharyngeal area. Patient denies any instances of vocal abuse to contribute to changes in vocal status. Patient reports if he is lying down or talking too long he start to feel gurgling sensation that causes him to feel like he is "drowning" without increased secretions in his oral cavity. How did the patient hear about us? Physician    Patient's goal(s): "to get rid of the problem"    Reason for referral: Change in vocal quality  Prior functional status: Communication effective and appropriate in all situations  Clinically complex situations: N/A    History: Patient is a 60 y.o. male who was referred to outpatient skilled Speech Therapy services for a voice evaluation. Patient has a hx of heart attack in 2018. Per ENT visit from 2023,   "Right Vocal Fold:  Abduction:    Moderate decreased  Adduction:    Mild decreased   Longitudinal Tension:   decreased     False fold atrophy     Procedures  Procedure:     Strobovideolaryngoscopy (84783)  Dynamic Voice Analysis (89218)  Laryngeal Sensory Testing (41542)     Pre-Operative Diagnosis:  1. Gurgling with phonation      Post-Operative Diagnosis:    1. Paresis of R VF  2. Right false fold atrophy   3.  Decreased sensation of R VF  4. Moderate laryngitis  5. Thick sticky mucus on VF bilaterally  6. Mild pooling of secretions in pyriform sinuses   7. Thin secretions diffusely in larynx   8. Lingual tonsil hypertrophy"     Plan:  Weakness with prolonged phonation  Evidence of R VF paresis     SLP for voice   1.GF diet  2. DF diet   3. Restech on meds      Future:   VF injection  VBS"     Hearing: WFL  Vision: WFL for testing    Home environment/lifestyle: indpendently  Highest level of education: Master's degree   Vocational status: currently in [employer]    Mental status: Alert  Behavior status: Cooperative  Patient reported symptoms of: n/a  Communication modalities: Verbal  Rehabilitation prognosis: Good rehab potential to reach the established goals    Assessments  VOICE EVALUATION:    Interview:   Chief complaint: fluid buid up in throat  -Onset: unknown  -Symptom progression since onset has been: stable  -Associated symptoms: none  -Voice is better: morning  -Voice is worse: in conversations    -Occupation: finances  -Vocal demand: moderate (voice use includes: work & socializing)  -Amplification: Never  -Past training or therapy: none  -Past problems: none    -Singing: no    -Voice surgery: no    Vocal hygiene:  -Smoking: no  -Water intake daily in oz: 64 fl oz  -Alcohol intake servings weekly: 1  -Caffeine intake daily in oz: 5 cups   -Caffeine-free beverages daily in oz: n/a  -Throat clearing: rare  -Lozenges: N/A  -Exposure to chemicals, dry, or orly environments: none    -Cough: no    -Dyspnea: no    -Dysphagia: no    -Allergy testing: yes  -Allergies: medications  -Nasal symptoms: none  -GERD/LPR symptoms: asymptomatic  -Recurrent URI: none  -Other major medical conditions: n/a      Patient Self-Ratings:  -The Voice Handicap Index (VHI) is a self-administered, 30-item outcomes instrument to quantify patients' perception of their voice handicap.  It is a list of statements that many people have used to describe their voices and the effects of their voices on their lives. Patient indicated how frequently she has the same experience using a rating point scale (“never” = 0, “almost never” = 1, “sometimes” = 2, “almost always” = 3, and “always” = 4). Patient obtained a score of 17/120, indicating a self-perceived impairment level of mild. (see scanned document)    Subscale: Score: Self-Perceived Impairment Level:   Physical 4/40 Mild   Functional 10/40 Mild   Emotional 3/40 Mild        TOTAL 17/120 Mild     Objective Measurements/Voice Parameters:  RESPIRATORY EFFICIENCY:   -S:Z Ratio Task: Patient was instructed to sustain the sounds /s/ and /z/ to examine the coordination and efficiency of respiration and voice production. Normative data suggests that adults can prolong these sounds for 20-25 seconds. Ratios of 1.4 and above are consistent with laryngeal inefficiency, and ratios of 2.0 and above are suggestive of vocal fold pathology. Patient's S:Z ratio was 1.05 (12.74 sec : 12.10 sec). -Maximum Phonation Time: Patient was instructed to sustain /a/ to measure respiratory and laryngeal coordination and efficiency. Adults are typically able to prolong vowels sound for 15-20 seconds. Reduced MPT may suggest poor respiratory support, or poor medial glottal closure. Patient's MPT was 18.2 seconds. First set, patient's MPT was 25.19 seconds however the next two sets dropped down approx. 11 secs. Due to build up of fluid in pharynx. MEASURES OF PITCH:  The OrderMotion-Pitch IV, a computer-driven voice analysis program, was used to collect objective voice data using the Visi-Pitch Multidimensional Voice Program (MDVP) & Real Time Pitch Program (MTPP). -Multi-Dimensional Voice Profile (MDVP):  This is obtained on the phonation of the sound /a/, in which dimensions of the voice, including frequncy perturbations, amplitude perturbations, variations in F0, noise to harmonic ratio, voice turbulence Index, soft phonation Index, tremours in frequency and amplitude, degree of subharmonics, and degree of voicing apart from the frequency and amplitude, are reflected. Normative Data:   Mean Fundamental Frequency (F0) 110.333 Hz 145.22 Hz   Jitter (RAP) 0.393% 0.345%   Shimmer 5.732% 2.523%   Mhlic-hu-Hzqezckcf Ratio (NHR) 0.177 0.122       -Habitual Pitch: Patient was instructed to engage in two different speaking tasks (counting and reading) to calculate the pitch he uses most frequently. Task: Mean F0 (Hz) Min-Max Range (Hz) Normative Data:   Speaking (counting 1-10) 104.29 85..78 128 Hz (100 Hz -150 Hz)   Reading ("[place]”) 97.51 80..60 128 Hz (100 Hz -150 Hz)       -Maximal Phonational Frequency Range: Patient was instructed to voice from lowest to highest notes. Task Min-Max Range (Hz) Normative Data:   Gliding 100..46 77 Hz-576 Hz         Patient was educated on  vocal hygiene throughout assessment. Vocal hygiene strategies included increased water intake, decreased caffeine intake, throat-clearing awareness, increased breath support/diaphragmatic breathing, compensatory strategies to minimize vocal abuses during work day, confidential voice. Patient was agreeable. Goals    Short-term goals:  Patient will be educated on vocal hygiene and demonstrate understanding of recommendations to facilitate increased quality of voice (to be achieved in 2-3 weeks).      Patient will utilize diaphragmatic breathing during oral sentence/paragraph reading in 80% of opportunities to facilitate increased breath support for voice/speaking, decrease laryngeal effort, and improve glottic closure (to be achieved in 4-6 weeks).      Patient will utilize semi-occluded vocal tract exercises without laryngeal tension in 80% of opportunities to promote healthy vocal function (to be achieved in 4-6 weeks).       Patient will utilize a forward tone focused/resonant voice in 80% of opportunities to decrease vocal hyperfunction and improve voice quality & vocal projection (to be achieved in 4-6 weeks).      Patient will produce a coordinated voice production in different situations (e.g., telephone, over background noise, emotional topics, speaking at a distance) in 80% of opportunities as perceptually judged by clinician (to be achieved in 6-8 weeks).       Long-term goals:     Patient will maximize vocal effectiveness relative to the existing laryngeal disorder and to return to vocal activities of daily living (to be achieved by discharge).      Patient will independently utilize vocal function exercises to maintain best level of voice to facilitate increased generational skills into conversational speech (to be achieved by discharge). Impressions/Recommendations    Impressions:   -Patient presents with a mild voice disorder c/b roughness, increased secretions in pharyngeal area, pitch breaks, and inadequate vocal loudness secondary to R vocal fold paresis and false vf atrophy. Recommended to continue to f/u with ENT to address secretions. According to objective measurements, voice parameter noted to be mildly out of the normative data. Patient would benefit from outpatient skilled Speech Therapy services to education on the vocal tract, further education/training on strategies to facilitate improved vocal quality/communication success, promote voice efficiency, promote safety, facilitate overall improved quality of life and instruction on HEP.     Recommendations:  -Patient would benefit from outpatient skilled Speech Therapy services: Voice therapy continue to f/u with ENT    -Frequency: 1x weekly  -Duration: 6-8 weeks    -Intervention certification from: 3/56/5470  -Intervention certification to: 63/57/1530    -Intervention comments:   55 mins of voice assessment

## 2023-08-08 NOTE — PROGRESS NOTES
Suture removal    Date/Time: 8/8/2023 2:45 PM    Performed by: Matilda Watkins  Authorized by: Ana Maria Sena MD  Universal Protocol:  Consent: Verbal consent obtained. Written consent not obtained. Risks and benefits: risks, benefits and alternatives were discussed  Consent given by: patient  Timeout called at: 8/8/2023 2:52 PM.  Patient understanding: patient states understanding of the procedure being performed  Patient consent: the patient's understanding of the procedure matches consent given  Procedure consent: procedure consent matches procedure scheduled  Relevant documents: relevant documents not present or verified  Test results: test results not available  Site marked: the operative site was not marked  Radiology Images displayed and confirmed. If images not available, report reviewed: imaging studies not available  Patient identity confirmed: verbally with patient        Patient location:  Clinic  Location:     Anesthesia laterality: upper. Location:  Trunk    Trunk location:  Back  Procedure details: Tools used:  Suture removal kit    Wound appearance:  No sign(s) of infection and good wound healing  Post-procedure details:     Patient tolerance of procedure:   Tolerated well, no immediate complications

## 2023-08-10 LAB
ENDOMYSIUM IGA SER QL: NEGATIVE
GLIADIN PEPTIDE IGA SER-ACNC: 3 UNITS (ref 0–19)
GLIADIN PEPTIDE IGG SER-ACNC: 1 UNITS (ref 0–19)
IGA SERPL-MCNC: 243 MG/DL (ref 90–386)
TTG IGA SER-ACNC: <2 U/ML (ref 0–3)
TTG IGG SER-ACNC: 3 U/ML (ref 0–5)

## 2023-08-11 LAB
MISCELLANEOUS LAB TEST RESULT: NORMAL

## 2023-08-13 DIAGNOSIS — K21.9 LARYNGOPHARYNGEAL REFLUX (LPR): ICD-10-CM

## 2023-08-14 RX ORDER — PANTOPRAZOLE SODIUM 40 MG/1
TABLET, DELAYED RELEASE ORAL
Qty: 90 TABLET | Refills: 1 | Status: SHIPPED | OUTPATIENT
Start: 2023-08-14

## 2023-08-17 ENCOUNTER — EVALUATION (OUTPATIENT)
Age: 58
End: 2023-08-17
Payer: COMMERCIAL

## 2023-08-17 DIAGNOSIS — R49.0 DYSPHONIA: Primary | ICD-10-CM

## 2023-08-17 DIAGNOSIS — R49.0 MUSCLE TENSION DYSPHONIA: ICD-10-CM

## 2023-08-17 DIAGNOSIS — J38.01 PARESIS OF RIGHT VOCAL FOLD: ICD-10-CM

## 2023-08-17 PROCEDURE — 92524 BEHAVRAL QUALIT ANALYS VOICE: CPT

## 2023-08-19 NOTE — PROGRESS NOTES
Daily Speech Treatment Note    Today's date: 2023  Patient’s name: Vasquez Osorio  : 1965  MRN: [id number]  Safety measures: hx of heart attack  Referring provider: Parish Yen MD    Encounter Diagnosis     ICD-10-CM    1. 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 Objective/Assessment:  -Reviewed patient's home exercises/activities completed since last appointment. .    Short-term goals:  Patient will be educated on vocal hygiene and demonstrate understanding of recommendations to facilitate increased quality of voice (to be achieved in 2-3 weeks).      Patient will utilize diaphragmatic breathing during oral sentence/paragraph reading in 80% of opportunities to facilitate increased breath support for voice/speaking, decrease laryngeal effort, and improve glottic closure (to be achieved in 4-6 weeks).      Patient will utilize semi-occluded vocal tract exercises without laryngeal tension in 80% of opportunities to promote healthy vocal function (to be achieved in 4-6 weeks).    SOVTE-    1.  Steady blowing- no voice- 3 sets- avg. 19.38 secs- MOD cueing-IMPROVEMENT- Patient benefited from video demonstration, visual demonstration, and verbal models. 2. Steady blowing- Voice on-3 sets- avg. 12.05 secs- mod cueing-IMPROVEMENT- Patient benefited from video demonstration, visual demonstration, and verbal models. Patient had difficulties with adequate lip seal.  3. Steady blowing- voice on, ascending glide-5 sets-mod-max cueing- Patient benefited from video demonstration, visual demonstration, and verbal models. Patient had difficulties with adequate lip seal.  4. Steady blowing- voice on, descending glide- 5 sets- mod-max cueing- Patient benefited from video demonstration, visual demonstration, and verbal models. Patient had difficulties with adequate lip seal.  5. Steady blowing- voice on, siren-5 sets- max cueing- Patient benefited from video demonstration, visual demonstration, and verbal models. Patient had difficulties with adequate lip seal.    Steady blowing- word level-100% acc- Patient benefited from visual demonstration and verbal models. Patient will utilize a forward tone focused/resonant voice in 80% of opportunities to decrease vocal hyperfunction and improve voice quality & vocal projection (to be achieved in 4-6 weeks).      Patient will produce a coordinated voice production in different situations (e.g., telephone, over background noise, emotional topics, speaking at a distance) in 80% of opportunities as perceptually judged by clinician (to be achieved in 6-8 weeks).     Plan:  -Patient was provided with home exercises/activities to target goals in plan of care at the end of today's session.  -Continue with current plan of care.

## 2023-08-19 NOTE — PROGRESS NOTES
Daily Speech Treatment Note    Today's date: 2023  Patient’s name: Endy Lopez  : 1965  MRN: [id number]  Safety measures: hx of heart attack  Referring provider: Chad Carmichael MD    Encounter Diagnosis     ICD-10-CM    1. 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  -Patient reported GF diet appeared to "help" with secretions in larynx. Objective/Assessment:  -Reviewed testing results and goals in plan care with patient. Patient is in agreement at this time. Short-term goals:  Patient will be educated on vocal hygiene and demonstrate understanding of recommendations to facilitate increased quality of voice (to be achieved in 2-3 weeks).      Patient will utilize diaphragmatic breathing during oral sentence/paragraph reading in 80% of opportunities to facilitate increased breath support for voice/speaking, decrease laryngeal effort, and improve glottic closure (to be achieved in 4-6 weeks).      Patient will utilize semi-occluded vocal tract exercises without laryngeal tension in 80% of opportunities to promote healthy vocal function (to be achieved in 4-6 weeks).     Clinician provided education of purpose of SOVTE and how it aids with VF paresis. 1.  Steady blowing- no voice- 3 sets- avg. 17.9 secs- Max cueing- Patient benefited from video demonstration, visual demonstration, and verbal models. 2. Steady blowing- Voice on-3 sets- avg. 8.9 secs- max cueing- Patient benefited from video demonstration, visual demonstration, and verbal models. Patient had difficulties with adequate lip seal.  3. Steady blowing- voice on, ascending glide-3 sets-max cueing- Patient benefited from video demonstration, visual demonstration, and verbal models. Patient had difficulties with adequate lip seal.  4. Steady blowing- voice on, descending glide-max cueing- Patient benefited from video demonstration, visual demonstration, and verbal models. Patient had difficulties with adequate lip seal.  5. Steady blowing- voice on, siren- max cueing- Patient benefited from video demonstration, visual demonstration, and verbal models. Patient had difficulties with adequate lip seal.    Patient was given [employer] handout of for [address]. Patient will utilize a forward tone focused/resonant voice in 80% of opportunities to decrease vocal hyperfunction and improve voice quality & vocal projection (to be achieved in 4-6 weeks).      Patient will produce a coordinated voice production in different situations (e.g., telephone, over background noise, emotional topics, speaking at a distance) in 80% of opportunities as perceptually judged by clinician (to be achieved in 6-8 weeks).     Plan:  -Patient was provided with home exercises/activities to target goals in plan of care at the end of today's session.  -Continue with current plan of care.

## 2023-08-22 ENCOUNTER — OFFICE VISIT (OUTPATIENT)
Age: 58
End: 2023-08-22
Payer: COMMERCIAL

## 2023-08-22 DIAGNOSIS — J38.01 PARESIS OF RIGHT VOCAL FOLD: ICD-10-CM

## 2023-08-22 DIAGNOSIS — R49.0 DYSPHONIA: Primary | ICD-10-CM

## 2023-08-22 DIAGNOSIS — R49.0 MUSCLE TENSION DYSPHONIA: ICD-10-CM

## 2023-08-22 PROCEDURE — 92526 ORAL FUNCTION THERAPY: CPT

## 2023-08-29 ENCOUNTER — OFFICE VISIT (OUTPATIENT)
Age: 58
End: 2023-08-29
Payer: COMMERCIAL

## 2023-08-29 DIAGNOSIS — R49.0 MUSCLE TENSION DYSPHONIA: ICD-10-CM

## 2023-08-29 DIAGNOSIS — J38.01 PARESIS OF RIGHT VOCAL FOLD: ICD-10-CM

## 2023-08-29 DIAGNOSIS — R49.0 DYSPHONIA: Primary | ICD-10-CM

## 2023-08-29 PROCEDURE — 92507 TX SP LANG VOICE COMM INDIV: CPT

## 2023-08-31 ENCOUNTER — OFFICE VISIT (OUTPATIENT)
Age: 58
End: 2023-08-31
Payer: COMMERCIAL

## 2023-08-31 VITALS
WEIGHT: 186.6 LBS | TEMPERATURE: 97.1 F | DIASTOLIC BLOOD PRESSURE: 80 MMHG | BODY MASS INDEX: 28.37 KG/M2 | RESPIRATION RATE: 18 BRPM | HEART RATE: 65 BPM | SYSTOLIC BLOOD PRESSURE: 116 MMHG | OXYGEN SATURATION: 98 %

## 2023-08-31 DIAGNOSIS — E78.49 OTHER HYPERLIPIDEMIA: Chronic | ICD-10-CM

## 2023-08-31 DIAGNOSIS — I25.10 CORONARY ARTERY DISEASE INVOLVING NATIVE CORONARY ARTERY OF NATIVE HEART WITHOUT ANGINA PECTORIS: Chronic | ICD-10-CM

## 2023-08-31 DIAGNOSIS — E03.9 ACQUIRED HYPOTHYROIDISM: Primary | Chronic | ICD-10-CM

## 2023-08-31 PROCEDURE — 99214 OFFICE O/P EST MOD 30 MIN: CPT | Performed by: INTERNAL MEDICINE

## 2023-08-31 RX ORDER — SILDENAFIL 50 MG/1
TABLET, FILM COATED ORAL
COMMUNITY
Start: 2023-08-25

## 2023-09-01 NOTE — PROGRESS NOTES
Name: Elena Arnold      : 1965      MRN: [id number]  Encounter Provider: Monika Her DO  Encounter Date: 2023   Encounter department: [address]     1. Acquired hypothyroidism    Repeat thyroid function testing in 2 weeks with recent adjustment in dosing. Also ordered repeat testing for 6 months. Will adjust dose as needed. - TSH, 3rd generation with Free T4 reflex; Future  - TSH, 3rd generation with Free T4 reflex; Future    2. Other hyperlipidemia    Lab Results   Component Value Date    LDLCALC 32 2023     Cholesterol is excellent. Continue statin. - CBC; Future  - Lipid panel; Future  - Basic metabolic panel; Future    3. Coronary artery disease involving native coronary artery of native heart without angina pectoris    Stable without angina. Continue current cardiovascular medications as prescribed. Heart healthy diet. Regular exercise. Return in about 6 months (around 3/1/2024) for Follow-up. Zain Jama presents for follow up. Overall doing well. No concerns or complaints. Review of Systems   Constitutional: Negative. Respiratory: Negative. Cardiovascular: Negative. Gastrointestinal: Negative. Musculoskeletal: Negative. Objective     /80 (BP Location: Left arm, Patient Position: Sitting, Cuff Size: Standard)   Pulse 65   Temp (!) 97.1 °F (36.2 °C) (Temporal)   Resp 18   Wt 84.6 kg (186 lb 9.6 oz)   SpO2 98%   BMI 28.37 kg/m²     Physical Exam  Constitutional:       General: He is not in acute distress. Appearance: He is well-developed. He is not diaphoretic. Neck:      Thyroid: No thyromegaly. Vascular: No JVD. Cardiovascular:      Rate and Rhythm: Normal rate and regular rhythm. Heart sounds: Normal heart sounds. No murmur heard. Pulmonary:      Effort: Pulmonary effort is normal. No respiratory distress. Breath sounds: Normal breath sounds.  No wheezing or rales. Abdominal:      General: Bowel sounds are normal. There is no distension. Palpations: Abdomen is soft. There is no mass. Tenderness: There is no abdominal tenderness. There is no guarding or rebound. Musculoskeletal:      Right lower leg: No edema. Left lower leg: No edema. Neurological:      Mental Status: He is alert.        Wilhelmenia Short, DO

## 2023-09-04 NOTE — PROGRESS NOTES
Daily Speech Treatment Note    Today's date: 2023  Patient’s name: Raj Oden  : 1965  MRN: [id number]  Safety measures: hx of heart attack  Referring provider: Jocelin Lora MD    Encounter Diagnosis     ICD-10-CM    1. 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 Objective/Assessment:  -Reviewed patient's home exercises/activities completed since last appointment. .    Short-term goals:  Patient will be educated on vocal hygiene and demonstrate understanding of recommendations to facilitate increased quality of voice (to be achieved in 2-3 weeks).      Patient will utilize diaphragmatic breathing during oral sentence/paragraph reading in 80% of opportunities to facilitate increased breath support for voice/speaking, decrease laryngeal effort, and improve glottic closure (to be achieved in 4-6 weeks).      Patient will utilize semi-occluded vocal tract exercises without laryngeal tension in 80% of opportunities to promote healthy vocal function (to be achieved in 4-6 weeks).    SOVTE-    1.  Steady blowing- no voice- 4 sets- avg. 18.18 secs- min cueing- Patient benefited from visual demonstration and verbal models. 2. Steady blowing- Voice on-3 sets- avg. 17.97 secs- min cueing-IMPROVEMENT- Patient benefited from  visual demonstration and verbal models. 3. Steady blowing- voice on, ascending glide-5 sets-mod-max cueing- Patient benefited from visual demonstration, and verbal models. Patient had difficulties with adequate lip seal.  4. Steady blowing- voice on, descending glide- 5 sets- min cueing- Patient benefited from visual demonstration, and verbal models.   5. Steady blowing- voice on, siren-5 sets- min cueing- Patient benefited from visual demonstration, and verbal models. Patient had difficulties initially coordinating phonation and respiration. Steady blowing- word level-100% acc- Patient benefited from visual demonstration and verbal models. Steady blowing-functional phrase level-90% acc. Patient benefited from visual demonstration and verbal models. Patient and clinician created list of 10 functional phrases to utilize in session. Patient will utilize a forward tone focused/resonant voice in 80% of opportunities to decrease vocal hyperfunction and improve voice quality & vocal projection (to be achieved in 4-6 weeks).      Patient will produce a coordinated voice production in different situations (e.g., telephone, over background noise, emotional topics, speaking at a distance) in 80% of opportunities as perceptually judged by clinician (to be achieved in 6-8 weeks).     Plan:  -Patient was provided with home exercises/activities to target goals in plan of care at the end of today's session.  -Continue with current plan of care.

## 2023-09-05 ENCOUNTER — OFFICE VISIT (OUTPATIENT)
Age: 58
End: 2023-09-05
Payer: COMMERCIAL

## 2023-09-05 DIAGNOSIS — R49.0 MUSCLE TENSION DYSPHONIA: ICD-10-CM

## 2023-09-05 DIAGNOSIS — J38.01 PARESIS OF RIGHT VOCAL FOLD: ICD-10-CM

## 2023-09-05 DIAGNOSIS — I25.10 CORONARY ARTERY DISEASE INVOLVING NATIVE CORONARY ARTERY OF NATIVE HEART WITHOUT ANGINA PECTORIS: ICD-10-CM

## 2023-09-05 DIAGNOSIS — R49.0 DYSPHONIA: Primary | ICD-10-CM

## 2023-09-05 PROCEDURE — 92507 TX SP LANG VOICE COMM INDIV: CPT

## 2023-09-05 RX ORDER — LISINOPRIL 10 MG/1
TABLET ORAL
Qty: 90 TABLET | Refills: 1 | Status: SHIPPED | OUTPATIENT
Start: 2023-09-05

## 2023-09-06 ENCOUNTER — NURSE TRIAGE (OUTPATIENT)
Dept: OTHER | Facility: OTHER | Age: 58
End: 2023-09-06

## 2023-09-06 DIAGNOSIS — E03.9 ACQUIRED HYPOTHYROIDISM: ICD-10-CM

## 2023-09-06 DIAGNOSIS — E78.49 OTHER HYPERLIPIDEMIA: ICD-10-CM

## 2023-09-06 DIAGNOSIS — I25.10 CORONARY ARTERY DISEASE INVOLVING NATIVE CORONARY ARTERY OF NATIVE HEART WITHOUT ANGINA PECTORIS: ICD-10-CM

## 2023-09-06 RX ORDER — METOPROLOL SUCCINATE 25 MG/1
25 TABLET, EXTENDED RELEASE ORAL DAILY
Qty: 90 TABLET | Refills: 1 | Status: SHIPPED | OUTPATIENT
Start: 2023-09-06

## 2023-09-06 RX ORDER — OMEGA-3-ACID ETHYL ESTERS 1 G/1
2 CAPSULE, LIQUID FILLED ORAL 2 TIMES DAILY
Qty: 360 CAPSULE | Refills: 0 | Status: SHIPPED | OUTPATIENT
Start: 2023-09-06 | End: 2023-09-07 | Stop reason: SDUPTHER

## 2023-09-06 RX ORDER — LEVOTHYROXINE SODIUM 0.12 MG/1
125 TABLET ORAL DAILY
Qty: 90 TABLET | Refills: 1 | Status: SHIPPED | OUTPATIENT
Start: 2023-09-06

## 2023-09-06 NOTE — TELEPHONE ENCOUNTER
Regarding: medication sent to the wrong pharmacy  ----- Message from Mariel Sanchez sent at 9/6/2023  6:31 PM EDT -----  Pt called "I need my [place] 3 sent to [facility] on [address]

## 2023-09-06 NOTE — TELEPHONE ENCOUNTER
Patient wanted his [place] 3 script to be sent to the pharmacy that he always uses -[facility] not [facility].

## 2023-09-06 NOTE — TELEPHONE ENCOUNTER
Medication Refill Request     Name levothyroxine  Dose/Frequency 125 mcg/1 tablet daily  Quantity 90  Verified pharmacy   [x]  Verified ordering Provider   [x]  Does patient have enough for the next 3 days? Yes [x] No []      Medication Refill Request     Name metoprolol  Dose/Frequency 25mg/1 tablet daily  Quantity 90  Verified pharmacy   [x]  Verified ordering Provider   [x]  Does patient have enough for the next 3 days?  Yes [x] No []

## 2023-09-06 NOTE — TELEPHONE ENCOUNTER
Reason for Disposition  • [1] Prescription prescribed recently is not at pharmacy AND [2] triager has access to patient's EMR AND [3] prescription is recorded in the EMR    Answer Assessment - Initial Assessment Questions  1. NAME of MEDICATION: "What medicine are you calling about?"      Omega 3  2. QUESTION: "What is your question?" (e.g., medication refill, side effect)      It was sent to the wrong pharmacy. 3. PRESCRIBING HCP: "Who prescribed it?" Reason: if prescribed by specialist, call should be referred to that group.       Dr. Sydnee Monzonsimona    Protocols used: MEDICATION QUESTION CALL-ADULT-

## 2023-09-07 PROBLEM — R49.0 MUSCLE TENSION DYSPHONIA: Status: ACTIVE | Noted: 2023-09-07

## 2023-09-07 PROBLEM — R13.14 PHARYNGOESOPHAGEAL DYSPHAGIA: Status: ACTIVE | Noted: 2023-09-07

## 2023-09-07 PROBLEM — J04.0 REFLUX LARYNGITIS: Status: ACTIVE | Noted: 2023-09-07

## 2023-09-07 PROBLEM — K21.9 GASTROESOPHAGEAL REFLUX DISEASE: Status: ACTIVE | Noted: 2023-09-07

## 2023-09-07 PROBLEM — K22.2 LOWER ESOPHAGEAL RING (SCHATZKI): Status: ACTIVE | Noted: 2023-09-07

## 2023-09-07 PROBLEM — K21.9 REFLUX LARYNGITIS: Status: ACTIVE | Noted: 2023-09-07

## 2023-09-07 PROBLEM — J38.01 PARESIS OF RIGHT VOCAL FOLD: Status: ACTIVE | Noted: 2023-09-07

## 2023-09-07 PROBLEM — R49.0 DYSPHONIA: Status: ACTIVE | Noted: 2023-09-07

## 2023-09-07 RX ORDER — OMEGA-3-ACID ETHYL ESTERS 1 G/1
2 CAPSULE, LIQUID FILLED ORAL 2 TIMES DAILY
Qty: 360 CAPSULE | Refills: 1 | Status: SHIPPED | OUTPATIENT
Start: 2023-09-07

## 2023-09-19 ENCOUNTER — OFFICE VISIT (OUTPATIENT)
Age: 58
End: 2023-09-19
Payer: COMMERCIAL

## 2023-09-19 DIAGNOSIS — R49.0 DYSPHONIA: Primary | ICD-10-CM

## 2023-09-19 DIAGNOSIS — J38.01 PARESIS OF RIGHT VOCAL FOLD: ICD-10-CM

## 2023-09-19 DIAGNOSIS — R49.0 MUSCLE TENSION DYSPHONIA: ICD-10-CM

## 2023-09-19 PROCEDURE — 92507 TX SP LANG VOICE COMM INDIV: CPT

## 2023-09-19 NOTE — PROGRESS NOTES
Speech-Language Pathology Discharge    Today's date: 2023   Patient’s name: Juli Sweet  : 1965  MRN: [id number]  Safety measures: history of heart attack  Referring provider: Matthew Hunter MD    Encounter Diagnosis     ICD-10-CM    1. 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 Patient reported Resteck procedure revealed episodes of GERD overnight. Patient reported he picked up the Reflux medication that ENT prescribed following Resteck procedure. Patient reported ENT did not know the cause of the Pareisis of right VF and if structurally the VF has improved (increased movement). Patient's goal(s): "to get rid of the problem"    Assessments/Treatment    Patient and clinician reviewed HEP. Clinician provided additional [employer] handouts that addressed GERD triggers/precautions, Vocal Hygiene, and Vocal Abusive Behaviors. Patient and clinician were both in agreement that patient has made good progress of Plan of Care and has reached maximum potential for discharge. Goals    Short-term goals:  Patient will be educated on vocal hygiene and demonstrate understanding of recommendations to facilitate increased quality of voice (to be achieved in 2-3 weeks). --MET     Patient will utilize diaphragmatic breathing during oral sentence/paragraph reading in 80% of opportunities to facilitate increased breath support for voice/speaking, decrease laryngeal effort, and improve glottic closure (to be achieved in 4-6 weeks).  PARTIALLY MET/DISCHARGE     Patient will utilize semi-occluded vocal tract exercises without laryngeal tension in 80% of opportunities to promote healthy vocal function (to be achieved in 4-6 weeks). -MET     Patient will utilize a forward tone focused/resonant voice in 80% of opportunities to decrease vocal hyperfunction and improve voice quality & vocal projection (to be achieved in 4-6 weeks). PARTIALLY MET/MAX POTENTIAL REACHED/DISCHARGE     Patient will produce a coordinated voice production in different situations (e.g., telephone, over background noise, emotional topics, speaking at a distance) in 80% of opportunities as perceptually judged by clinician (to be achieved in 6-8 weeks). PARTIALLY MET/DISCHARGE     Long-term goals:     Patient will maximize vocal effectiveness relative to the existing laryngeal disorder and to return to vocal activities of daily living (to be achieved by discharge). PARTIALLY MET/MAX POTENTIAL REACHED/DISCHARGE     Patient will independently utilize vocal function exercises to maintain best level of voice to facilitate increased generational skills into conversational speech (to be achieved by discharge). -MET    Impressions/Recommendations    Impressions:   -Patient presents with an improved mild voice disorder c/b roughness and increased secretions in pharyngeal area secondary to R vocal fold paresis and false vf atrophy. Patient f/u with ENT and ENT reported utilized a "SLP refresher when needed." Patient consistently completed HEP and utilize [employer]. Patient made and sustained good progress towards goals in plan of care and suspected to reach maximum potential for discharge. Patient to be discharged to an independent Home Exercise Program. Provided patient additional education of Vocal Abuse Behaviors, Vocal Hygiene, and GERD/LPR precautions. If any changes or concerns arise from vocal quality, provided education patient will need a new script for voice therapy. Reciprocal agreement noted.       -Patient to be discharged from outpatient skilled Speech Therapy services: Patient made good progress toward goals in plan of care and has reached maximum potential. Patient to be discharged to an independent Home Exercise Program. If any changes or concerns arise from vocal quality, provided education patient will need a new script for voice therapy.     -Frequency: No treatment is warranted at this time.  -Duration: N/A      Intervention comments:  45 mins of speech/language treatment

## 2023-10-11 DIAGNOSIS — J31.0 CHRONIC RHINITIS: ICD-10-CM

## 2023-10-11 RX ORDER — AZELASTINE 1 MG/ML
SPRAY, METERED NASAL
Qty: 90 ML | Refills: 3 | Status: SHIPPED | OUTPATIENT
Start: 2023-10-11

## 2023-10-17 DIAGNOSIS — F33.9 DEPRESSION, RECURRENT (HCC): ICD-10-CM

## 2023-10-17 RX ORDER — BUPROPION HYDROCHLORIDE 150 MG/1
TABLET ORAL
Qty: 90 TABLET | Refills: 1 | Status: SHIPPED | OUTPATIENT
Start: 2023-10-17

## 2023-10-25 DIAGNOSIS — E78.49 OTHER HYPERLIPIDEMIA: ICD-10-CM

## 2023-10-25 RX ORDER — ATORVASTATIN CALCIUM 80 MG/1
TABLET, FILM COATED ORAL
Qty: 90 TABLET | Refills: 3 | Status: SHIPPED | OUTPATIENT
Start: 2023-10-25

## 2023-10-27 DIAGNOSIS — F33.9 DEPRESSION, RECURRENT (HCC): ICD-10-CM

## 2023-10-29 RX ORDER — BUPROPION HYDROCHLORIDE 150 MG/1
TABLET ORAL
Qty: 90 TABLET | Refills: 1 | Status: SHIPPED | OUTPATIENT
Start: 2023-10-29

## 2023-12-22 ENCOUNTER — TELEPHONE (OUTPATIENT)
Age: 58
End: 2023-12-22

## 2023-12-22 NOTE — TELEPHONE ENCOUNTER
Pt calling to req a sooner appt than what he has scheduled in June with Dr STROUD    Pt says he has a raised dark spot he wants checked    Advised pt he can call back first thing Tues 12/26 and we can see about putting him into one of Dr STROUD's same day appts, or a cancellation we may receive

## 2023-12-26 ENCOUNTER — OFFICE VISIT (OUTPATIENT)
Age: 58
End: 2023-12-26
Payer: COMMERCIAL

## 2023-12-26 VITALS — HEIGHT: 68 IN | BODY MASS INDEX: 28.19 KG/M2 | WEIGHT: 186 LBS

## 2023-12-26 DIAGNOSIS — L82.0 INFLAMED SEBORRHEIC KERATOSIS: Primary | ICD-10-CM

## 2023-12-26 PROCEDURE — 17110 DESTRUCTION B9 LES UP TO 14: CPT | Performed by: DERMATOLOGY

## 2023-12-26 NOTE — PATIENT INSTRUCTIONS
"Treatment with Cryotherapy    The doctor has treated your skin with nitrogen, which is 320 degrees Fahrenheit below zero.  He has given the treated area \"frostbite.\"    Stinging should subside within a few hours.  You can take Tylenol for pain, if needed.    Over the next few days, it is normal if the area becomes reddened, a blood blister, or swollen with fluid.  If the lesion treated was near the eye - you could get a swollen eye over the next few days.  Do not panic!  This is all temporary, and will resolve with time.    There is no special treatment - just keep the area clean.  Makeup and BandAids can be used, if preferred.    When the area starts to dry up and peel off, using Vaseline can help healing.    It usually takes up to a month for it to heal.  Some lesions are recurrent and may require repeat treatments.  If a lesion has not healed in one month, please don't hesitate to contact us.      If you have any further questions that are not answered here, please call us.  [phone].    Thank you for allowing us to care for you.    "

## 2023-12-26 NOTE — PROGRESS NOTES
"[facility] Dermatology Clinic Note     Patient Name: Anjali Perez  Encounter Date: December 26, 2023     Have you been cared for by a [facility] Dermatologist in the last 3 years and, if so, which description applies to you?    Yes.  I have been here within the last 3 years, and my medical history has NOT changed since that time.  I am MALE/not capable of bearing children.    REVIEW OF SYSTEMS:  Have you recently had or currently have any of the following? No changes in my recent health.   PAST MEDICAL HISTORY:  Have you personally ever had or currently have any of the following?  If \"YES,\" then please provide more detail. No changes in my medical history.   HISTORY OF IMMUNOSUPPRESSION: Do you have a history of any of the following:  Systemic Immunosuppression such as Diabetes, Biologic or Immunotherapy, Chemotherapy, Organ Transplantation, Bone Marrow Transplantation?  No     Answering \"YES\" requires the addition of the dotphrase \"IMMUNOSUPPRESSED\" as the first diagnosis of the patient's visit.   FAMILY HISTORY:  Any \"first degree relatives\" (parent, brother, sister, or child) with the following?    No changes in my family's known health.   PATIENT EXPERIENCE:    Do you want the Dermatologist to perform a COMPLETE skin exam today including a clinical examination under the \"bra and underwear\" areas?  NO  If necessary, do we have your permission to call and leave a detailed message on your Preferred Phone number that includes your specific medical information?  Yes      No Known Allergies   Current Outpatient Medications:     aspirin 81 mg chewable tablet, Chew 81 mg daily, Disp: , Rfl:     atorvastatin (LIPITOR) 80 mg tablet, TAKE 1 TABLET(80 MG) BY MOUTH DAILY, Disp: 90 tablet, Rfl: 3    azelastine (ASTELIN) 0.1 % nasal spray, USE 1 TO 2 SPRAYS IN EACH NOSTRIL TWICE DAILY, Disp: 90 mL, Rfl: 3    buPROPion (WELLBUTRIN XL) 150 mg 24 hr tablet, TAKE 1 TABLET(150 MG) BY MOUTH EVERY MORNING, Disp: 90 tablet, Rfl: " 1    cimetidine (TAGAMET) 400 mg tablet, Take 1 tablet (400 mg total) by mouth daily at bedtime, Disp: 90 tablet, Rfl: 3    levothyroxine 125 mcg tablet, Take 1 tablet (125 mcg total) by mouth daily, Disp: 90 tablet, Rfl: 1    lisinopril (ZESTRIL) 10 mg tablet, TAKE 1 TABLET(10 MG) BY MOUTH DAILY, Disp: 90 tablet, Rfl: 1    metoprolol succinate (TOPROL-XL) 25 mg 24 hr tablet, Take 1 tablet (25 mg total) by mouth daily, Disp: 90 tablet, Rfl: 1    omega-3-acid ethyl esters (LOVAZA) 1 g capsule, Take 2 capsules (2 g total) by mouth 2 (two) times a day, Disp: 360 capsule, Rfl: 1    pantoprazole (PROTONIX) 40 mg tablet, TAKE 1 TABLET(40 MG) BY MOUTH DAILY BEFORE BREAKFAST, Disp: 90 tablet, Rfl: 1    sildenafil (VIAGRA) 50 MG tablet, take 1 tablet by mouth if needed AN HOUR BEFORE SEX DO NOT TAKE M...  (REFER TO PRESCRIPTION NOTES)., Disp: , Rfl:           Whom besides the patient is providing clinical information about today's encounter?   NO ADDITIONAL HISTORIAN (patient alone provided history)    Physical Exam and Assessment/Plan by Diagnosis:    Chief complaint: Patient is a 59 y/o male present for a new spot of concern on the left medial wrist. Started around August and has not healed and is growing.    SEBORRHEIC KERATOSIS; INFLAMED    Physical Exam:  Anatomic Location Affected:  Left Medial Wrist  Morphological Description:  6mm irritated keratotic papule    Additional History of Present Condition:  Patient reports noticing since August    Assessment and Plan:  Based on a thorough discussion of this condition and the management approach to it (including a comprehensive discussion of the known risks, side effects and potential benefits of treatment), the patient (family) agrees to implement the following specific plan:  PROCEDURE:  DESTRUCTION OF BENIGN LESIONS WITH CRYOTHERAPY  After a thorough discussion of treatment options and risk/benefits/alternatives (including but not limited to local pain, scarring,  dyspigmentation, blistering, and possible superinfection), verbal and written consent were obtained and the aforementioned lesions were treated on with cryotherapy using liquid nitrogen x 1 cycle for 5-10 seconds.    TOTAL NUMBER of 1 lesions were treated today on the ANATOMIC LOCATION: Left Medial Wrist    The patient tolerated the procedure well, and after-care instructions were provided.        Scribe Attestation      I,:  Pattie Styles am acting as a scribe while in the presence of the attending physician.:       I,:  Franck Tony MD personally performed the services described in this documentation    as scribed in my presence.:

## 2023-12-26 NOTE — TELEPHONE ENCOUNTER
Patient left voice mail on the PCP office number to request an appointment about what sounds like a spot of concern. I scheduled patient to see Dr. Tony this afternoon at 4:30pm

## 2024-01-02 DIAGNOSIS — I25.10 CORONARY ARTERY DISEASE INVOLVING NATIVE CORONARY ARTERY OF NATIVE HEART WITHOUT ANGINA PECTORIS: ICD-10-CM

## 2024-01-02 RX ORDER — LISINOPRIL 10 MG/1
TABLET ORAL
Qty: 90 TABLET | Refills: 1 | Status: SHIPPED | OUTPATIENT
Start: 2024-01-02 | End: 2024-01-03 | Stop reason: SDUPTHER

## 2024-01-03 DIAGNOSIS — K21.9 LARYNGOPHARYNGEAL REFLUX (LPR): ICD-10-CM

## 2024-01-03 DIAGNOSIS — I25.10 CORONARY ARTERY DISEASE INVOLVING NATIVE CORONARY ARTERY OF NATIVE HEART WITHOUT ANGINA PECTORIS: ICD-10-CM

## 2024-01-04 RX ORDER — LISINOPRIL 10 MG/1
10 TABLET ORAL DAILY
Qty: 90 TABLET | Refills: 3 | Status: SHIPPED | OUTPATIENT
Start: 2024-01-04

## 2024-01-04 RX ORDER — PANTOPRAZOLE SODIUM 40 MG/1
40 TABLET, DELAYED RELEASE ORAL DAILY
Qty: 90 TABLET | Refills: 3 | Status: SHIPPED | OUTPATIENT
Start: 2024-01-04

## 2024-01-17 DIAGNOSIS — E03.9 ACQUIRED HYPOTHYROIDISM: ICD-10-CM

## 2024-01-17 DIAGNOSIS — I25.10 CORONARY ARTERY DISEASE INVOLVING NATIVE CORONARY ARTERY OF NATIVE HEART WITHOUT ANGINA PECTORIS: ICD-10-CM

## 2024-01-17 RX ORDER — METOPROLOL SUCCINATE 25 MG/1
25 TABLET, EXTENDED RELEASE ORAL DAILY
Qty: 90 TABLET | Refills: 1 | Status: SHIPPED | OUTPATIENT
Start: 2024-01-17

## 2024-01-17 RX ORDER — LEVOTHYROXINE SODIUM 0.12 MG/1
125 TABLET ORAL DAILY
Qty: 90 TABLET | Refills: 0 | Status: SHIPPED | OUTPATIENT
Start: 2024-01-17

## 2024-01-21 PROBLEM — K90.41 GLUTEN INTOLERANCE: Status: ACTIVE | Noted: 2024-01-21

## 2024-01-26 DIAGNOSIS — K21.9 LARYNGOPHARYNGEAL REFLUX (LPR): ICD-10-CM

## 2024-01-26 DIAGNOSIS — F33.9 DEPRESSION, RECURRENT (HCC): ICD-10-CM

## 2024-01-29 RX ORDER — PANTOPRAZOLE SODIUM 40 MG/1
40 TABLET, DELAYED RELEASE ORAL DAILY
Qty: 90 TABLET | Refills: 0 | Status: SHIPPED | OUTPATIENT
Start: 2024-01-29

## 2024-01-29 RX ORDER — BUPROPION HYDROCHLORIDE 150 MG/1
TABLET ORAL
Qty: 90 TABLET | Refills: 0 | Status: SHIPPED | OUTPATIENT
Start: 2024-01-29

## 2024-03-01 ENCOUNTER — TRANSCRIPTION ENCOUNTER (OUTPATIENT)
Age: 59
End: 2024-03-01

## 2024-03-26 DIAGNOSIS — E03.9 ACQUIRED HYPOTHYROIDISM: ICD-10-CM

## 2024-03-26 RX ORDER — LEVOTHYROXINE SODIUM 0.12 MG/1
125 TABLET ORAL DAILY
Qty: 90 TABLET | Refills: 0 | Status: SHIPPED | OUTPATIENT
Start: 2024-03-26

## 2024-04-18 DIAGNOSIS — I25.10 CORONARY ARTERY DISEASE INVOLVING NATIVE CORONARY ARTERY OF NATIVE HEART WITHOUT ANGINA PECTORIS: ICD-10-CM

## 2024-04-19 RX ORDER — METOPROLOL SUCCINATE 25 MG/1
25 TABLET, EXTENDED RELEASE ORAL DAILY
Qty: 90 TABLET | Refills: 0 | Status: SHIPPED | OUTPATIENT
Start: 2024-04-19

## 2024-04-21 DIAGNOSIS — F33.9 DEPRESSION, RECURRENT (HCC): ICD-10-CM

## 2024-04-21 DIAGNOSIS — E78.49 OTHER HYPERLIPIDEMIA: ICD-10-CM

## 2024-04-21 RX ORDER — BUPROPION HYDROCHLORIDE 150 MG/1
TABLET ORAL
Qty: 90 TABLET | Refills: 1 | Status: SHIPPED | OUTPATIENT
Start: 2024-04-21

## 2024-04-22 RX ORDER — OMEGA-3-ACID ETHYL ESTERS 1 G/1
2 CAPSULE, LIQUID FILLED ORAL 2 TIMES DAILY
Qty: 360 CAPSULE | Refills: 1 | Status: SHIPPED | OUTPATIENT
Start: 2024-04-22

## 2024-04-29 ENCOUNTER — TELEPHONE (OUTPATIENT)
Age: 59
End: 2024-04-29

## 2024-05-14 ENCOUNTER — APPOINTMENT (OUTPATIENT)
Age: 59
End: 2024-05-14
Payer: COMMERCIAL

## 2024-05-14 DIAGNOSIS — D64.9 ANEMIA, UNSPECIFIED TYPE: ICD-10-CM

## 2024-05-14 DIAGNOSIS — E03.9 ACQUIRED HYPOTHYROIDISM: Chronic | ICD-10-CM

## 2024-05-14 DIAGNOSIS — E78.00 PURE HYPERCHOLESTEROLEMIA: ICD-10-CM

## 2024-05-14 DIAGNOSIS — I51.9 MYXEDEMA HEART DISEASE: ICD-10-CM

## 2024-05-14 DIAGNOSIS — E78.49 OTHER HYPERLIPIDEMIA: Chronic | ICD-10-CM

## 2024-05-14 DIAGNOSIS — E55.9 VITAMIN D DEFICIENCY: ICD-10-CM

## 2024-05-14 DIAGNOSIS — R73.01 IMPAIRED FASTING GLUCOSE: ICD-10-CM

## 2024-05-14 DIAGNOSIS — E03.9 MYXEDEMA HEART DISEASE: ICD-10-CM

## 2024-05-14 DIAGNOSIS — I50.42 CHRONIC COMBINED SYSTOLIC AND DIASTOLIC HEART FAILURE (HCC): ICD-10-CM

## 2024-05-14 DIAGNOSIS — I10 ESSENTIAL HYPERTENSION, MALIGNANT: ICD-10-CM

## 2024-05-14 LAB
25(OH)D3 SERPL-MCNC: 71.3 NG/ML (ref 30–100)
ALBUMIN SERPL BCP-MCNC: 4.8 G/DL (ref 3.5–5)
ALP SERPL-CCNC: 51 U/L (ref 34–104)
ALT SERPL W P-5'-P-CCNC: 30 U/L (ref 7–52)
ANION GAP SERPL CALCULATED.3IONS-SCNC: 9 MMOL/L (ref 4–13)
AST SERPL W P-5'-P-CCNC: 23 U/L (ref 13–39)
BILIRUB DIRECT SERPL-MCNC: 0.16 MG/DL (ref 0–0.2)
BILIRUB SERPL-MCNC: 0.48 MG/DL (ref 0.2–1)
BNP SERPL-MCNC: 14 PG/ML (ref 0–100)
BUN SERPL-MCNC: 20 MG/DL (ref 5–25)
CALCIUM SERPL-MCNC: 9.7 MG/DL (ref 8.4–10.2)
CHLORIDE SERPL-SCNC: 101 MMOL/L (ref 96–108)
CHOLEST SERPL-MCNC: 117 MG/DL
CK SERPL-CCNC: 122 U/L (ref 39–308)
CO2 SERPL-SCNC: 28 MMOL/L (ref 21–32)
CREAT SERPL-MCNC: 1.11 MG/DL (ref 0.6–1.3)
CREAT UR-MCNC: 40.8 MG/DL
CRP SERPL QL: <1 MG/L
ERYTHROCYTE [SEDIMENTATION RATE] IN BLOOD: 14 MM/HOUR (ref 0–19)
EST. AVERAGE GLUCOSE BLD GHB EST-MCNC: 111 MG/DL
FERRITIN SERPL-MCNC: 95 NG/ML (ref 24–336)
GFR SERPL CREATININE-BSD FRML MDRD: 71 ML/MIN/1.73SQ M
GLUCOSE P FAST SERPL-MCNC: 92 MG/DL (ref 65–99)
HBA1C MFR BLD: 5.5 %
HDLC SERPL-MCNC: 30 MG/DL
IRON SATN MFR SERPL: 21 % (ref 15–50)
IRON SERPL-MCNC: 73 UG/DL (ref 50–212)
LDLC SERPL CALC-MCNC: 73 MG/DL (ref 0–100)
LDLC SERPL DIRECT ASSAY-MCNC: 82 MG/DL (ref 0–100)
MICROALBUMIN UR-MCNC: <7 MG/L
MICROALBUMIN/CREAT 24H UR: <17 MG/G CREATININE (ref 0–30)
POTASSIUM SERPL-SCNC: 4.2 MMOL/L (ref 3.5–5.3)
PROT SERPL-MCNC: 7.9 G/DL (ref 6.4–8.4)
PSA SERPL-MCNC: 0.37 NG/ML (ref 0–4)
SODIUM SERPL-SCNC: 138 MMOL/L (ref 135–147)
T3 SERPL-MCNC: 1.6 NG/ML
T4 SERPL-MCNC: 9.36 UG/DL (ref 6.09–12.23)
TIBC SERPL-MCNC: 346 UG/DL (ref 250–450)
TRIGL SERPL-MCNC: 70 MG/DL
TSH SERPL DL<=0.05 MIU/L-ACNC: 4.45 UIU/ML (ref 0.45–4.5)
UIBC SERPL-MCNC: 273 UG/DL (ref 155–355)
URATE SERPL-MCNC: 6.4 MG/DL (ref 3.5–8.5)

## 2024-05-14 PROCEDURE — 86376 MICROSOMAL ANTIBODY EACH: CPT

## 2024-05-14 PROCEDURE — 84550 ASSAY OF BLOOD/URIC ACID: CPT

## 2024-05-14 PROCEDURE — 84436 ASSAY OF TOTAL THYROXINE: CPT

## 2024-05-14 PROCEDURE — 82248 BILIRUBIN DIRECT: CPT

## 2024-05-14 PROCEDURE — 82306 VITAMIN D 25 HYDROXY: CPT

## 2024-05-14 PROCEDURE — 82570 ASSAY OF URINE CREATININE: CPT

## 2024-05-14 PROCEDURE — 84443 ASSAY THYROID STIM HORMONE: CPT

## 2024-05-14 PROCEDURE — 82550 ASSAY OF CK (CPK): CPT

## 2024-05-14 PROCEDURE — 83550 IRON BINDING TEST: CPT

## 2024-05-14 PROCEDURE — 83880 ASSAY OF NATRIURETIC PEPTIDE: CPT

## 2024-05-14 PROCEDURE — 86140 C-REACTIVE PROTEIN: CPT

## 2024-05-14 PROCEDURE — 83695 ASSAY OF LIPOPROTEIN(A): CPT

## 2024-05-14 PROCEDURE — 83721 ASSAY OF BLOOD LIPOPROTEIN: CPT

## 2024-05-14 PROCEDURE — 36415 COLL VENOUS BLD VENIPUNCTURE: CPT

## 2024-05-14 PROCEDURE — 85652 RBC SED RATE AUTOMATED: CPT

## 2024-05-14 PROCEDURE — 80053 COMPREHEN METABOLIC PANEL: CPT

## 2024-05-14 PROCEDURE — 84153 ASSAY OF PSA TOTAL: CPT

## 2024-05-14 PROCEDURE — 80061 LIPID PANEL: CPT

## 2024-05-14 PROCEDURE — 86038 ANTINUCLEAR ANTIBODIES: CPT

## 2024-05-14 PROCEDURE — 82728 ASSAY OF FERRITIN: CPT

## 2024-05-14 PROCEDURE — 82043 UR ALBUMIN QUANTITATIVE: CPT

## 2024-05-14 PROCEDURE — 82172 ASSAY OF APOLIPOPROTEIN: CPT

## 2024-05-14 PROCEDURE — 83036 HEMOGLOBIN GLYCOSYLATED A1C: CPT

## 2024-05-14 PROCEDURE — 84480 ASSAY TRIIODOTHYRONINE (T3): CPT

## 2024-05-14 PROCEDURE — 83540 ASSAY OF IRON: CPT

## 2024-05-15 LAB
ANA SER QL IA: NEGATIVE
APO B SERPL-MCNC: 76 MG/DL
LPA SERPL-SCNC: <9 NMOL/L
THYROPEROXIDASE AB SERPL-ACNC: 498 IU/ML (ref 0–34)

## 2024-05-16 ENCOUNTER — OFFICE VISIT (OUTPATIENT)
Age: 59
End: 2024-05-16
Payer: COMMERCIAL

## 2024-05-16 VITALS
RESPIRATION RATE: 18 BRPM | BODY MASS INDEX: 28.46 KG/M2 | DIASTOLIC BLOOD PRESSURE: 78 MMHG | SYSTOLIC BLOOD PRESSURE: 116 MMHG | OXYGEN SATURATION: 97 % | WEIGHT: 187.8 LBS | TEMPERATURE: 97.1 F | HEIGHT: 68 IN | HEART RATE: 66 BPM

## 2024-05-16 DIAGNOSIS — E03.9 ACQUIRED HYPOTHYROIDISM: Chronic | ICD-10-CM

## 2024-05-16 DIAGNOSIS — I25.10 CAD IN NATIVE ARTERY: Chronic | ICD-10-CM

## 2024-05-16 DIAGNOSIS — E78.49 OTHER HYPERLIPIDEMIA: Primary | Chronic | ICD-10-CM

## 2024-05-16 PROCEDURE — 99214 OFFICE O/P EST MOD 30 MIN: CPT | Performed by: INTERNAL MEDICINE

## 2024-05-16 RX ORDER — LOSARTAN POTASSIUM 50 MG/1
50 TABLET ORAL 2 TIMES DAILY
COMMUNITY
Start: 2024-05-07

## 2024-05-16 NOTE — ASSESSMENT & PLAN NOTE
Ideal LDL < 55-60 mg/dL. Continue statin and fish oil. Moderate cardiovascular exercise.    Orders:    Lipid Panel with Direct LDL reflex; Future    Basic metabolic panel; Future

## 2024-05-16 NOTE — ASSESSMENT & PLAN NOTE
Thyroid function is normal. Continue levothyroxine.    Orders:    TSH, 3rd generation with Free T4 reflex; Future

## 2024-05-16 NOTE — PROGRESS NOTES
Ambulatory Visit  Name: Anjali Perez      : 1965      MRN: [id number]  Encounter Provider: Brock De La Cruz DO  Encounter Date: 2024   Encounter department: [facility] [place]    Assessment & Plan  Other hyperlipidemia    Ideal LDL < 55-60 mg/dL. Continue statin and fish oil. Moderate cardiovascular exercise.    Orders:    Lipid Panel with Direct LDL reflex; Future    Basic metabolic panel; Future    Acquired hypothyroidism    Thyroid function is normal. Continue levothyroxine.    Orders:    TSH, 3rd generation with Free T4 reflex; Future    CAD in native artery    Follow-up with cardiology. Stable on current medications.      Depression Screening and Follow-up Plan: Patient was screened for depression during today's encounter. They screened negative with a PHQ-9 score of 0.      History of Present Illness     History of Present Illness  The patient presents for follow-up.    The patient has been making efforts to maintain a healthier diet. A recent body scan revealed a fatty liver, prompting a series of blood tests. The patient is currently on the maximum dose of Lipitor 80 mg and is taking omega-3 supplements. The patient's medication was switched from lisinopril to losartan for better blood pressure control. The patient does not believe his blood pressure has been an issue recently, although it was significantly elevated when the patient visited the physician's office. The patient has recently started monitoring the blood pressure again, which has been within normal limits. The patient spends the majority of the day standing, prompting the patient to use a standing desk.     Review of Systems   Constitutional:  Negative for activity change, appetite change and fatigue.   Respiratory:  Negative for apnea, cough, chest tightness, shortness of breath and wheezing.    Cardiovascular:  Negative for chest pain, palpitations and leg swelling.   Gastrointestinal:  Negative for abdominal  "distention, abdominal pain, blood in stool, constipation, diarrhea, nausea and vomiting.   Neurological:  Negative for dizziness, weakness, light-headedness, numbness and headaches.   Psychiatric/Behavioral:  Negative for behavioral problems, confusion, hallucinations, sleep disturbance and suicidal ideas. The patient is not nervous/anxious.    Objective     /78 (BP Location: Left arm, Patient Position: Sitting, Cuff Size: Standard)   Pulse 66   Temp (!) 97.1 °F (36.2 °C) (Tympanic)   Resp 18   Ht 5' 8\" (1.727 m)   Wt 85.2 kg (187 lb 12.8 oz)   SpO2 97%   BMI 28.55 kg/m²     Physical Exam  Constitutional:       General: He is not in acute distress.     Appearance: He is not ill-appearing.   Cardiovascular:      Rate and Rhythm: Normal rate and regular rhythm.      Heart sounds: No murmur heard.  Pulmonary:      Effort: Pulmonary effort is normal. No respiratory distress.      Breath sounds: No wheezing.   Abdominal:      General: Bowel sounds are normal. There is no distension.      Tenderness: There is no abdominal tenderness.   Musculoskeletal:      Right lower leg: No edema.      Left lower leg: No edema.   Neurological:      Mental Status: He is alert.     Brock Nothstein, DO   "

## 2024-05-21 ENCOUNTER — NEW PATIENT COMPREHENSIVE (OUTPATIENT)
Dept: URBAN - METROPOLITAN AREA CLINIC 76 | Facility: CLINIC | Age: 59
End: 2024-05-21

## 2024-05-21 DIAGNOSIS — H25.093: ICD-10-CM

## 2024-05-21 PROCEDURE — 92250 FUNDUS PHOTOGRAPHY W/I&R: CPT | Mod: NC

## 2024-05-21 PROCEDURE — 92004 COMPRE OPH EXAM NEW PT 1/>: CPT

## 2024-05-21 ASSESSMENT — KERATOMETRY
OS_K2POWER_DIOPTERS: 43.75
OD_AXISANGLE2_DEGREES: 123
OS_AXISANGLE2_DEGREES: 78
OS_K1POWER_DIOPTERS: 43.50
OD_K1POWER_DIOPTERS: 43.50
OD_K2POWER_DIOPTERS: 43.75
OD_AXISANGLE_DEGREES: 033
OS_AXISANGLE_DEGREES: 168

## 2024-05-21 ASSESSMENT — VISUAL ACUITY
OS_SC: 20/20
OU_CC: 20/20
OD_SC: 20/20

## 2024-05-21 ASSESSMENT — TONOMETRY
OD_IOP_MMHG: 14
OS_IOP_MMHG: 15

## 2024-05-31 ENCOUNTER — HOSPITAL ENCOUNTER (OUTPATIENT)
Dept: ULTRASOUND IMAGING | Facility: HOSPITAL | Age: 59
End: 2024-05-31
Payer: COMMERCIAL

## 2024-05-31 ENCOUNTER — HOSPITAL ENCOUNTER (OUTPATIENT)
Dept: RADIOLOGY | Facility: HOSPITAL | Age: 59
End: 2024-05-31
Payer: COMMERCIAL

## 2024-05-31 DIAGNOSIS — R16.2 HEPATOSPLENOMEGALY: ICD-10-CM

## 2024-05-31 DIAGNOSIS — I10 HYPERTENSION, UNSPECIFIED TYPE: ICD-10-CM

## 2024-05-31 PROCEDURE — 76705 ECHO EXAM OF ABDOMEN: CPT

## 2024-05-31 PROCEDURE — 71046 X-RAY EXAM CHEST 2 VIEWS: CPT

## 2024-05-31 PROCEDURE — 76981 USE PARENCHYMA: CPT

## 2024-06-11 DIAGNOSIS — K21.9 LARYNGOPHARYNGEAL REFLUX (LPR): ICD-10-CM

## 2024-06-12 RX ORDER — PANTOPRAZOLE SODIUM 40 MG/1
40 TABLET, DELAYED RELEASE ORAL DAILY
Qty: 90 TABLET | Refills: 1 | Status: SHIPPED | OUTPATIENT
Start: 2024-06-12

## 2024-06-17 DIAGNOSIS — E78.49 OTHER HYPERLIPIDEMIA: ICD-10-CM

## 2024-06-18 RX ORDER — OMEGA-3-ACID ETHYL ESTERS 1 G/1
2 CAPSULE, LIQUID FILLED ORAL 2 TIMES DAILY
Qty: 360 CAPSULE | Refills: 1 | Status: SHIPPED | OUTPATIENT
Start: 2024-06-18

## 2024-06-30 DIAGNOSIS — E03.9 ACQUIRED HYPOTHYROIDISM: ICD-10-CM

## 2024-06-30 RX ORDER — LEVOTHYROXINE SODIUM 0.12 MG/1
125 TABLET ORAL DAILY
Qty: 90 TABLET | Refills: 1 | Status: SHIPPED | OUTPATIENT
Start: 2024-06-30

## 2024-07-07 DIAGNOSIS — I25.10 CORONARY ARTERY DISEASE INVOLVING NATIVE CORONARY ARTERY OF NATIVE HEART WITHOUT ANGINA PECTORIS: ICD-10-CM

## 2024-07-07 RX ORDER — METOPROLOL SUCCINATE 25 MG/1
25 TABLET, EXTENDED RELEASE ORAL DAILY
Qty: 90 TABLET | Refills: 1 | Status: SHIPPED | OUTPATIENT
Start: 2024-07-07

## 2024-08-16 ENCOUNTER — APPOINTMENT (OUTPATIENT)
Age: 59
End: 2024-08-16
Payer: COMMERCIAL

## 2024-08-19 ENCOUNTER — HOSPITAL ENCOUNTER (OUTPATIENT)
Dept: RADIOLOGY | Facility: HOSPITAL | Age: 59
Discharge: HOME | End: 2024-08-19
Payer: COMMERCIAL

## 2024-08-19 DIAGNOSIS — R13.10 DYSPHAGIA: ICD-10-CM

## 2024-08-19 PROCEDURE — 74220 X-RAY XM ESOPHAGUS 1CNTRST: CPT

## 2024-08-20 ENCOUNTER — OFFICE VISIT (OUTPATIENT)
Age: 59
End: 2024-08-20
Payer: COMMERCIAL

## 2024-08-20 VITALS
WEIGHT: 190 LBS | BODY MASS INDEX: 28.79 KG/M2 | HEART RATE: 60 BPM | SYSTOLIC BLOOD PRESSURE: 118 MMHG | HEIGHT: 68 IN | OXYGEN SATURATION: 96 % | DIASTOLIC BLOOD PRESSURE: 80 MMHG | TEMPERATURE: 97.7 F

## 2024-08-20 DIAGNOSIS — D18.01 CHERRY ANGIOMA: ICD-10-CM

## 2024-08-20 DIAGNOSIS — D22.9 MULTIPLE NEVI: ICD-10-CM

## 2024-08-20 DIAGNOSIS — Z13.89 SCREENING FOR SKIN CONDITION: Primary | ICD-10-CM

## 2024-08-20 DIAGNOSIS — L82.1 SEBORRHEIC KERATOSIS: ICD-10-CM

## 2024-08-20 PROCEDURE — 99213 OFFICE O/P EST LOW 20 MIN: CPT | Performed by: DERMATOLOGY

## 2024-08-20 RX ORDER — CLOPIDOGREL BISULFATE 75 MG/1
75 TABLET ORAL DAILY
COMMUNITY
Start: 2024-07-12

## 2024-08-20 RX ORDER — ROSUVASTATIN CALCIUM 40 MG/1
40 TABLET, COATED ORAL DAILY
COMMUNITY
Start: 2024-07-12

## 2024-08-20 NOTE — PROGRESS NOTES
"[facility] Dermatology Clinic Note     Patient Name: Anjali Perez  Encounter Date: 08/20/2024     Have you been cared for by a [facility] Dermatologist in the last 3 years and, if so, which description applies to you?    Yes.  I have been here within the last 3 years, and my medical history has NOT changed since that time.  I am MALE/not capable of bearing children.    REVIEW OF SYSTEMS:  Have you recently had or currently have any of the following? No changes in my recent health.   PAST MEDICAL HISTORY:  Have you personally ever had or currently have any of the following?  If \"YES,\" then please provide more detail. No changes in my medical history.   HISTORY OF IMMUNOSUPPRESSION: Do you have a history of any of the following:  Systemic Immunosuppression such as Diabetes, Biologic or Immunotherapy, Chemotherapy, Organ Transplantation, Bone Marrow Transplantation?  No     Answering \"YES\" requires the addition of the dotphrase \"IMMUNOSUPPRESSED\" as the first diagnosis of the patient's visit.   FAMILY HISTORY:  Any \"first degree relatives\" (parent, brother, sister, or child) with the following?    No changes in my family's known health.   PATIENT EXPERIENCE:    Do you want the Dermatologist to perform a COMPLETE skin exam today including a clinical examination under the \"bra and underwear\" areas?  Yes  If necessary, do we have your permission to call and leave a detailed message on your Preferred Phone number that includes your specific medical information?  Yes      No Known Allergies   Current Outpatient Medications:     aspirin 81 mg chewable tablet, Chew 81 mg daily, Disp: , Rfl:     atorvastatin (LIPITOR) 80 mg tablet, TAKE 1 TABLET(80 MG) BY MOUTH DAILY, Disp: 90 tablet, Rfl: 3    azelastine (ASTELIN) 0.1 % nasal spray, USE 1 TO 2 SPRAYS IN EACH NOSTRIL TWICE DAILY, Disp: 90 mL, Rfl: 3    buPROPion (WELLBUTRIN XL) 150 mg 24 hr tablet, Take 1 tablet ( 150 mg total ) by mouth every morning, Disp: 90 tablet, Rfl: " "1    levothyroxine 125 mcg tablet, Take 1 tablet (125 mcg total) by mouth daily, Disp: 90 tablet, Rfl: 1    losartan (COZAAR) 50 mg tablet, Take 50 mg by mouth 2 (two) times a day, Disp: , Rfl:     metoprolol succinate (TOPROL-XL) 25 mg 24 hr tablet, TAKE 1 TABLET(25 MG) BY MOUTH DAILY, Disp: 90 tablet, Rfl: 1    omega-3-acid ethyl esters (LOVAZA) 1 g capsule, Take 2 capsules (2 g total) by mouth 2 (two) times a day, Disp: 360 capsule, Rfl: 1    pantoprazole (PROTONIX) 40 mg tablet, Take 1 tablet (40 mg total) by mouth daily, Disp: 90 tablet, Rfl: 1          Whom besides the patient is providing clinical information about today's encounter?   NO ADDITIONAL HISTORIAN (patient alone provided history)    59 year old male present for a yearly skin exam.     Physical Exam and Assessment/Plan by Diagnosis:    MELANOCYTIC NEVI (\"Moles\")    Physical Exam:  Anatomic Location Affected: Mostly on sun-exposed areas of the body  Morphological Description:  Scattered, 1-4mm round to ovoid, symmetrical-appearing, even bordered, skin colored to dark brown macules/papules, mostly in sun-exposed areas    Additional History of Present Condition:  present on exam    Assessment and Plan:  Based on a thorough discussion of this condition and the management approach to it (including a comprehensive discussion of the known risks, side effects and potential benefits of treatment), the patient (family) agrees to implement the following specific plan:  Provided handout with information regarding the ABCDE's of moles   Recommend routine skin exams every year   Sun avoidance, protective clothing (known as UPF clothing), and the use of at least SPF 30 sunscreens is advised. Sunscreen should be reapplied every two hours when outside.       SEBORRHEIC KERATOSIS; NON-INFLAMED    Physical Exam:  Anatomic Location Affected:  scattered across trunk, extremities,  face  Morphological Description:  Flat and raised, waxy, smooth to warty textured, yellow " "to brownish-grey to dark brown to blackish, discrete, \"stuck-on\" appearing papules.    Additional History of Present Condition:  Patient reports new bumps on the skin.  Denies itch, burn, pain, bleeding or ulceration.  Present constantly; nothing seems to make it worse or better.  No prior treatment.      Assessment and Plan:  Based on a thorough discussion of this condition and the management approach to it (including a comprehensive discussion of the known risks, side effects and potential benefits of treatment), the patient (family) agrees to implement the following specific plan:  Reassured benign      ANGIOMA (\"CHERRY ANGIOMA\")    Physical Exam:  Anatomic Location: scattered across sun exposed areas of the trunk and extremities   Morphologic Description: Firm red to reddish-blue discrete papules    Additional History of Present Condition:  Present on exam.     Assessment and Plan:  Reassured benign      Scribe Attestation      I,:  Thea Bravo MA am acting as a scribe while in the presence of the attending physician.:       I,:  Franck Tony MD personally performed the services described in this documentation    as scribed in my presence.:           "

## 2024-08-20 NOTE — PATIENT INSTRUCTIONS
"MELANOCYTIC NEVI (\"Moles\")    Melanocytic nevi (\"moles\") are tan or brown, raised or flat areas of the skin which have an increased number of melanocytes. Melanocytes are the cells in our body which make pigment and account for skin color.    Some moles are present at birth (I.e., \"congenital nevi\"), while others come up later in life (i.e., \"acquired nevi\").  The sun can stimulate the body to make more moles.  Sunburns are not the only thing that triggers more moles.  Chronic sun exposure can do it too.     Clinically distinguishing a healthy mole from melanoma may be difficult, even for experienced dermatologists. The \"ABCDE's\" of moles have been suggested as a means of helping to alert a person to a suspicious mole and the possible increased risk of melanoma.  The suggestions for raising alert are as follows:    Asymmetry: Healthy moles tend to be symmetric, while melanomas are often asymmetric.  Asymmetry means if you draw a line through the mole, the two halves do not match in color, size, shape, or surface texture. Asymmetry can be a result of rapid enlargement of a mole, the development of a raised area on a previously flat lesion, scaling, ulceration, bleeding or scabbing within the mole.  Any mole that starts to demonstrate \"asymmetry\" should be examined promptly by a board certified dermatologist.     Border: Healthy moles tend to have discrete, even borders.  The border of a melanoma often blends into the normal skin and does not sharply delineate the mole from normal skin.  Any mole that starts to demonstrate \"uneven borders\" should be examined promptly by a board certified dermatologist.     Color: Healthy moles tend to be one color throughout.  Melanomas tend to be made up of different colors ranging from dark black, blue, white, or red.  Any mole that demonstrates a color change should be examined promptly by a board certified dermatologist.     Diameter: Healthy moles tend to be smaller than 0.6 cm " "in size; an exception are \"congenital nevi\" that can be larger.  Melanomas tend to grow and can often be greater than 0.6 cm (1/4 of an inch, or the size of a pencil eraser). This is only a guideline, and many normal moles may be larger than 0.6 cm without being unhealthy.  Any mole that starts to change in size (small to bigger or bigger to smaller) should be examined promptly by a board certified dermatologist.     Evolving: Healthy moles tend to \"stay the same.\"  Melanomas may often show signs of change or evolution such as a change in size, shape, color, or elevation.  Any mole that starts to itch, bleed, crust, burn, hurt, or ulcerate or demonstrate a change or evolution should be examined promptly by a board certified dermatologist.      Dysplastic Nevi  Dysplastic moles are moles that fit the ABCDE rules of melanoma but are not identified as melanomas when examined under the microscope.  They may indicate an increased risk of melanoma in that person. If there is a family history of melanoma, most experts agree that the person may be at an increased risk for developing a melanoma.  Experts still do not agree on what dysplastic moles mean in patients without a personal or family history of melanoma.  Dysplastic moles are usually larger than common moles and have different colors within it with irregular borders. The appearance can be very similar to a melanoma. Biopsies of dysplastic moles may show abnormalities which are different from a regular mole.      Melanoma  Malignant melanoma is a type of skin cancer that can be deadly if it spreads throughout the body. The incidence of melanoma in the [place] is growing faster than any other cancer. Melanoma usually grows near the surface of the skin for a period of time, and then begins to grow deeper into the skin. Once it grows deeper into the skin, the risk of spread to other organs greatly increases. Therefore, early detection and removal of a malignant " "melanoma may result in a better chance at a complete cure; removal after the tumor has spread may not be as effective, leading to worse clinical outcomes such as death.    The true rate of nevus transformation into a melanoma is unknown. It has been estimated that the lifetime risk for any acquired melanocytic nevus on any 20-year-old individual transforming into melanoma by age 80 is 0.03% (1 in 3,164) for men and 0.009% (1 in 10,800) for women.     The appearance of a \"new mole\" remains one of the most reliable methods for identifying a malignant melanoma.  Occasionally, melanomas appear as rapidly growing, blue-black, dome-shaped bumps within a previous mole or previous area of normal skin.  Other times, melanomas are suspected when a mole suddenly appears or changes. Itching, burning, or pain in a pigmented lesion should increase suspicion, but most patients with early melanoma have no skin discomfort whatsoever.  Melanoma can occur anywhere on the skin, including areas that are difficult for self-examination. Many melanomas are first noticed by other family members.  Suspicious-looking moles may be removed for microscopic examination.       You may be able to prevent death from melanoma by doing two simple things:    Try to avoid unnecessary sun exposure and protect your skin when it is exposed to the sun.  People who live near the equator, people who have intermittent exposures to large amounts of sun, and people who have had sunburns in childhood or adolescence have an increased risk for melanoma. Sun sense and vigilant sun protection may be keys to helping to prevent melanoma.  We recommend wearing UPF-rated sun protective clothing and sunglasses whenever possible and applying a moisturizer-sunscreen combination product (SPF 50+) such as Neutrogena Daily Defense to sun exposed areas of skin at least three times a day.    Have your moles regularly examined by a board certified dermatologist AND by yourself or " "a family member/friend at home.  We recommend that you have your moles examined at least once a year by a board certified dermatologist.  Use your birthday as an annual reminder to have your \"Birthday Suit\" (I.e., your skin) examined; it is a nice birthday gift to yourself to know that your skin is healthy appearing!  Additionally, at-home self examinations may be helpful for detecting a possible melanoma.  Use the ABCDEs we discussed and check your moles once a month at home.        SEBORRHEIC KERATOSIS    A seborrheic keratosis is a harmless warty spot that appears during adult life as a common sign of skin aging.  Seborrheic keratoses can arise on any area of skin, covered or uncovered, with the usual exception of the palms and soles. They do not arise from mucous membranes. Seborrheic keratoses can have highly variable appearance.      Seborrheic keratoses are extremely common. It has been estimated that over 90% of adults over the age of 60 years have one or more of them. They occur in males and females of all races, typically beginning to erupt in the 30s or 40s. They are uncommon under the age of 20 years.  The precise cause of seborrhoeic keratoses is not known.  Seborrhoeic keratoses are considered degenerative in nature. As time goes by, seborrheic keratoses tend to become more numerous. Some people inherit a tendency to develop a very large number of them; some people may have hundreds of them.    The name \"seborrheic keratosis\" is misleading, because these lesions are not limited to a seborrhoeic distribution (scalp, mid-face, chest, upper back), nor are they formed from sebaceous glands, nor are they associated with sebum -- which is greasy.  Seborrheic keratosis may also be called \"SK,\" \"Seb K,\" \"basal cell papilloma,\" \"senile wart,\" or \"barnacle.\"      There is no easy way to remove multiple lesions on a single occasion.  Unless a specific lesion is \"inflamed\" and is causing pain or stinging/burning " "or is bleeding, most insurance companies do not authorize treatment.      ANGIOMA (\"CHERRY ANGIOMA\")    Ramirez angiomas markedly increase in number from about the age of 40, so it has been estimated that 75% of people over 75 years of age have them. Although they also called \"senile angiomas,\" they can occur in young people too - 5% of adolescents have been found to have them.     Cherry angiomas are very common in males and females of any age or race, with no difference in sexes or races affected. They are however more noticeable in white skin than in skin of colour.  There may be a family history of similar lesions. Eruptive (very large number appearing in a short period of time) cherry angiomas have been rarely reported to be associated with internal malignancy and pregnancy.   "

## 2024-10-04 DIAGNOSIS — F33.9 DEPRESSION, RECURRENT (HCC): ICD-10-CM

## 2024-10-04 DIAGNOSIS — E78.49 OTHER HYPERLIPIDEMIA: ICD-10-CM

## 2024-10-04 RX ORDER — ATORVASTATIN CALCIUM 80 MG/1
TABLET, FILM COATED ORAL
Qty: 90 TABLET | Refills: 3 | OUTPATIENT
Start: 2024-10-04

## 2024-10-04 RX ORDER — BUPROPION HYDROCHLORIDE 150 MG/1
TABLET ORAL
Qty: 90 TABLET | Refills: 1 | Status: SHIPPED | OUTPATIENT
Start: 2024-10-04

## 2024-10-09 DIAGNOSIS — I25.10 CORONARY ARTERY DISEASE INVOLVING NATIVE CORONARY ARTERY OF NATIVE HEART WITHOUT ANGINA PECTORIS: ICD-10-CM

## 2024-10-09 RX ORDER — LOSARTAN POTASSIUM 50 MG/1
50 TABLET ORAL 2 TIMES DAILY
Qty: 90 TABLET | Refills: 1 | Status: SHIPPED | OUTPATIENT
Start: 2024-10-09

## 2024-10-09 RX ORDER — METOPROLOL SUCCINATE 25 MG/1
25 TABLET, EXTENDED RELEASE ORAL DAILY
Qty: 90 TABLET | Refills: 1 | Status: SHIPPED | OUTPATIENT
Start: 2024-10-09

## 2024-10-18 ENCOUNTER — APPOINTMENT (OUTPATIENT)
Age: 59
End: 2024-10-18
Payer: COMMERCIAL

## 2024-10-18 ENCOUNTER — OFFICE VISIT (OUTPATIENT)
Age: 59
End: 2024-10-18
Payer: COMMERCIAL

## 2024-10-18 VITALS
HEIGHT: 68 IN | OXYGEN SATURATION: 96 % | BODY MASS INDEX: 28.73 KG/M2 | WEIGHT: 189.6 LBS | RESPIRATION RATE: 18 BRPM | TEMPERATURE: 96.8 F | SYSTOLIC BLOOD PRESSURE: 128 MMHG | HEART RATE: 68 BPM | DIASTOLIC BLOOD PRESSURE: 80 MMHG

## 2024-10-18 DIAGNOSIS — R05.3 PERSISTENT COUGH FOR 3 WEEKS OR LONGER: Primary | ICD-10-CM

## 2024-10-18 DIAGNOSIS — K21.9 LARYNGOPHARYNGEAL REFLUX (LPR): ICD-10-CM

## 2024-10-18 DIAGNOSIS — R05.3 PERSISTENT COUGH FOR 3 WEEKS OR LONGER: ICD-10-CM

## 2024-10-18 PROCEDURE — 99214 OFFICE O/P EST MOD 30 MIN: CPT | Performed by: INTERNAL MEDICINE

## 2024-10-18 PROCEDURE — 71046 X-RAY EXAM CHEST 2 VIEWS: CPT

## 2024-10-18 RX ORDER — PANTOPRAZOLE SODIUM 40 MG/1
40 TABLET, DELAYED RELEASE ORAL DAILY
Qty: 60 TABLET | Refills: 0 | Status: SHIPPED | OUTPATIENT
Start: 2024-10-18

## 2024-10-18 NOTE — PROGRESS NOTES
"Ambulatory Visit  Name: Anjali Perez      : 1965      MRN: [id number]  Encounter Provider: Brock De La Cruz DO  Encounter Date: 10/18/2024   Encounter department: [facility] PRIMARY CARE [place]    Assessment & Plan   1. Persistent cough for 3 weeks or longer  2. Laryngopharyngeal reflux (LPR)    Does not appear sick/ill at this time. No evidence of rhinorrhea/nasal congestion on exam. Does not sound like he is experiencing allergies or post-nasal drip. Cough may ultimately be a byproduct of reflux. Will start him back on pantoprazole for at least the next 2 months. For completeness, will check an x-ray.    - XR chest pa and lateral; Future  - pantoprazole (PROTONIX) 40 mg tablet; Take 1 tablet (40 mg total) by mouth daily  Dispense: 60 tablet; Refill: 0         History of Present Illness     Anjali presents for cough that has been going on at least 1 month. Has a history of GERD/LPR. Has noticed some increased heartburn lately. Was on protonix in the past.    Denies being sick within the past month. It's mostly a dry cough. Denies any shortness of breath, chest tightness, wheezing, fever, chills. Denies any allergy symptoms. Denies any post-nasal drip or rhinorrhea.    Review of Systems   Constitutional: Negative.    Respiratory:  Positive for cough. Negative for apnea, choking, shortness of breath and wheezing.    Cardiovascular: Negative.    Gastrointestinal: Negative.      Objective     /80 (BP Location: Left arm, Patient Position: Sitting, Cuff Size: Standard)   Pulse 68   Temp (!) 96.8 °F (36 °C) (Temporal)   Resp 18   Ht 5' 8\" (1.727 m)   Wt 86 kg (189 lb 9.6 oz)   SpO2 96%   BMI 28.83 kg/m²     Physical Exam  Constitutional:       General: He is not in acute distress.     Appearance: He is not ill-appearing.   HENT:      Nose: No congestion or rhinorrhea.      Mouth/Throat:      Mouth: Mucous membranes are moist.      Pharynx: No oropharyngeal exudate or posterior " oropharyngeal erythema.   Cardiovascular:      Rate and Rhythm: Normal rate and regular rhythm.      Heart sounds: No murmur heard.  Pulmonary:      Effort: Pulmonary effort is normal. No respiratory distress.      Breath sounds: No wheezing.   Abdominal:      General: Bowel sounds are normal. There is no distension.      Tenderness: There is no abdominal tenderness.   Neurological:      Mental Status: He is alert.       [facility], DO

## 2024-11-13 ENCOUNTER — APPOINTMENT (OUTPATIENT)
Age: 59
End: 2024-11-13
Payer: COMMERCIAL

## 2024-11-13 DIAGNOSIS — E78.00 PURE HYPERCHOLESTEROLEMIA: ICD-10-CM

## 2024-11-13 DIAGNOSIS — R73.01 IMPAIRED FASTING GLUCOSE: ICD-10-CM

## 2024-11-13 DIAGNOSIS — E03.9 MYXEDEMA HEART DISEASE: ICD-10-CM

## 2024-11-13 DIAGNOSIS — I51.9 MYXEDEMA HEART DISEASE: ICD-10-CM

## 2024-11-13 LAB
ALBUMIN SERPL BCG-MCNC: 4.7 G/DL (ref 3.5–5)
ALP SERPL-CCNC: 54 U/L (ref 34–104)
ALT SERPL W P-5'-P-CCNC: 53 U/L (ref 7–52)
ANION GAP SERPL CALCULATED.3IONS-SCNC: 10 MMOL/L (ref 4–13)
AST SERPL W P-5'-P-CCNC: 38 U/L (ref 13–39)
BILIRUB DIRECT SERPL-MCNC: 0.12 MG/DL (ref 0–0.2)
BILIRUB SERPL-MCNC: 0.4 MG/DL (ref 0.2–1)
BUN SERPL-MCNC: 16 MG/DL (ref 5–25)
CALCIUM SERPL-MCNC: 9.8 MG/DL (ref 8.4–10.2)
CHLORIDE SERPL-SCNC: 100 MMOL/L (ref 96–108)
CHOLEST SERPL-MCNC: 100 MG/DL (ref ?–200)
CK SERPL-CCNC: 116 U/L (ref 39–308)
CO2 SERPL-SCNC: 29 MMOL/L (ref 21–32)
CREAT SERPL-MCNC: 1.07 MG/DL (ref 0.6–1.3)
EST. AVERAGE GLUCOSE BLD GHB EST-MCNC: 111 MG/DL
GFR SERPL CREATININE-BSD FRML MDRD: 75 ML/MIN/1.73SQ M
GLUCOSE P FAST SERPL-MCNC: 92 MG/DL (ref 65–99)
HBA1C MFR BLD: 5.5 %
HDLC SERPL-MCNC: 29 MG/DL
LDLC SERPL DIRECT ASSAY-MCNC: 52 MG/DL (ref 0–100)
POTASSIUM SERPL-SCNC: 4.5 MMOL/L (ref 3.5–5.3)
PROT SERPL-MCNC: 8.1 G/DL (ref 6.4–8.4)
SODIUM SERPL-SCNC: 139 MMOL/L (ref 135–147)
TRIGL SERPL-MCNC: 140 MG/DL (ref ?–150)
TSH SERPL DL<=0.05 MIU/L-ACNC: 3.47 UIU/ML (ref 0.45–4.5)
URATE SERPL-MCNC: 5.8 MG/DL (ref 3.5–8.5)

## 2024-11-13 PROCEDURE — 80053 COMPREHEN METABOLIC PANEL: CPT

## 2024-11-13 PROCEDURE — 83036 HEMOGLOBIN GLYCOSYLATED A1C: CPT

## 2024-11-13 PROCEDURE — 80061 LIPID PANEL: CPT

## 2024-11-13 PROCEDURE — 82248 BILIRUBIN DIRECT: CPT

## 2024-11-13 PROCEDURE — 84443 ASSAY THYROID STIM HORMONE: CPT

## 2024-11-13 PROCEDURE — 36415 COLL VENOUS BLD VENIPUNCTURE: CPT

## 2024-11-13 PROCEDURE — 84550 ASSAY OF BLOOD/URIC ACID: CPT

## 2024-11-13 PROCEDURE — 82550 ASSAY OF CK (CPK): CPT

## 2024-11-13 PROCEDURE — 83721 ASSAY OF BLOOD LIPOPROTEIN: CPT

## 2024-11-25 DIAGNOSIS — E78.49 OTHER HYPERLIPIDEMIA: ICD-10-CM

## 2024-11-25 RX ORDER — OMEGA-3-ACID ETHYL ESTERS 1 G/1
2 CAPSULE, LIQUID FILLED ORAL 2 TIMES DAILY
Qty: 360 CAPSULE | Refills: 1 | Status: SHIPPED | OUTPATIENT
Start: 2024-11-25

## 2024-12-13 DIAGNOSIS — K21.9 LARYNGOPHARYNGEAL REFLUX (LPR): ICD-10-CM

## 2024-12-13 DIAGNOSIS — E78.49 OTHER HYPERLIPIDEMIA: ICD-10-CM

## 2024-12-13 DIAGNOSIS — E03.9 ACQUIRED HYPOTHYROIDISM: ICD-10-CM

## 2024-12-13 DIAGNOSIS — F33.9 DEPRESSION, RECURRENT (HCC): ICD-10-CM

## 2024-12-13 DIAGNOSIS — I25.10 CORONARY ARTERY DISEASE INVOLVING NATIVE CORONARY ARTERY OF NATIVE HEART WITHOUT ANGINA PECTORIS: ICD-10-CM

## 2024-12-13 RX ORDER — BUPROPION HYDROCHLORIDE 150 MG/1
TABLET ORAL
Qty: 90 TABLET | Refills: 1 | Status: SHIPPED | OUTPATIENT
Start: 2024-12-13

## 2024-12-13 RX ORDER — PANTOPRAZOLE SODIUM 40 MG/1
40 TABLET, DELAYED RELEASE ORAL DAILY
Qty: 90 TABLET | Refills: 1 | Status: SHIPPED | OUTPATIENT
Start: 2024-12-13

## 2024-12-13 RX ORDER — ROSUVASTATIN CALCIUM 40 MG/1
40 TABLET, COATED ORAL DAILY
Qty: 90 TABLET | Refills: 1 | Status: SHIPPED | OUTPATIENT
Start: 2024-12-13

## 2024-12-13 RX ORDER — LEVOTHYROXINE SODIUM 125 UG/1
125 TABLET ORAL DAILY
Qty: 90 TABLET | Refills: 1 | Status: SHIPPED | OUTPATIENT
Start: 2024-12-13

## 2024-12-13 RX ORDER — LOSARTAN POTASSIUM 50 MG/1
50 TABLET ORAL 2 TIMES DAILY
Qty: 180 TABLET | Refills: 1 | Status: SHIPPED | OUTPATIENT
Start: 2024-12-13

## 2024-12-13 RX ORDER — OMEGA-3-ACID ETHYL ESTERS 1 G/1
2 CAPSULE, LIQUID FILLED ORAL 2 TIMES DAILY
Qty: 360 CAPSULE | Refills: 1 | Status: SHIPPED | OUTPATIENT
Start: 2024-12-13

## 2024-12-13 RX ORDER — CLOPIDOGREL BISULFATE 75 MG/1
75 TABLET ORAL DAILY
Qty: 90 TABLET | Refills: 1 | Status: SHIPPED | OUTPATIENT
Start: 2024-12-13

## 2024-12-13 RX ORDER — METOPROLOL SUCCINATE 25 MG/1
25 TABLET, EXTENDED RELEASE ORAL DAILY
Qty: 90 TABLET | Refills: 1 | Status: SHIPPED | OUTPATIENT
Start: 2024-12-13

## 2024-12-13 NOTE — TELEPHONE ENCOUNTER
Reason for call:   [x] Refill   [] Prior Auth  [x] Other: not duplicate request- pharmacy change     Office:   [x] PCP/Provider -   [] Specialty/Provider -     Medication:   buPROPion (WELLBUTRIN XL) 150 mg 24 hr tablet              Summary: TAKE 1 TABLET(150 MG) BY MOUTH EVERY MORNING          clopidogrel (PLAVIX) 75 mg tablet 75 mg, Oral, Daily             Summary: Take 75 mg by mouth daily,           levothyroxine 125 mcg tablet 125 mcg, Oral, Daily             Summary: Take 1 tablet (125 mcg total) by mouth daily,         losartan (COZAAR) 50 mg tablet 50 mg, Oral, 2 times daily             Summary: Take 1 tablet (50 mg total) by mouth 2 (two) times a day,           metoprolol succinate (TOPROL-XL) 25 mg 24 hr tablet 25 mg, Oral, Daily             Summary: Take 1 tablet (25 mg total) by mouth daily,          omega-3-acid ethyl esters (LOVAZA) 1 g capsule 2 g, Oral, 2 times daily             Summary: Take 2 capsules (2 g total) by mouth 2 (two) times a da          pantoprazole (PROTONIX) 40 mg tablet 40 mg, Oral, Daily             Summary: Take 1 tablet (40 mg total) by mouth daily,          rosuvastatin (CRESTOR) 40 MG tablet 40 mg, Oral, Daily             Summary: Take 40 mg by mouth daily,          Pharmacy: [facility]/pharmacy #[id number]  [address]NA[address] - [address] [place], [place] [address] .     Does the patient have enough for 3 days?   [x] Yes   [] No - Send as HP to POD

## 2025-01-29 ENCOUNTER — APPOINTMENT (OUTPATIENT)
Age: 60
End: 2025-01-29
Payer: COMMERCIAL

## 2025-01-29 DIAGNOSIS — E78.49 OTHER HYPERLIPIDEMIA: Primary | ICD-10-CM

## 2025-01-29 LAB — BILIRUB DIRECT SERPL-MCNC: 0.03 MG/DL (ref 0–0.2)

## 2025-01-29 PROCEDURE — 82248 BILIRUBIN DIRECT: CPT

## 2025-03-19 ENCOUNTER — TELEPHONE (OUTPATIENT)
Age: 60
End: 2025-03-19

## 2025-05-19 DIAGNOSIS — E03.9 ACQUIRED HYPOTHYROIDISM: ICD-10-CM

## 2025-05-19 DIAGNOSIS — I25.10 CORONARY ARTERY DISEASE INVOLVING NATIVE CORONARY ARTERY OF NATIVE HEART WITHOUT ANGINA PECTORIS: ICD-10-CM

## 2025-05-19 DIAGNOSIS — E78.49 OTHER HYPERLIPIDEMIA: ICD-10-CM

## 2025-05-19 DIAGNOSIS — F33.9 DEPRESSION, RECURRENT (HCC): ICD-10-CM

## 2025-05-20 DIAGNOSIS — I25.10 CORONARY ARTERY DISEASE INVOLVING NATIVE CORONARY ARTERY OF NATIVE HEART WITHOUT ANGINA PECTORIS: ICD-10-CM

## 2025-05-20 DIAGNOSIS — F33.9 DEPRESSION, RECURRENT (HCC): ICD-10-CM

## 2025-05-20 DIAGNOSIS — E03.9 ACQUIRED HYPOTHYROIDISM: ICD-10-CM

## 2025-05-20 DIAGNOSIS — E78.49 OTHER HYPERLIPIDEMIA: ICD-10-CM

## 2025-05-20 RX ORDER — OMEGA-3-ACID ETHYL ESTERS 1 G/1
2 CAPSULE, LIQUID FILLED ORAL 2 TIMES DAILY
Qty: 360 CAPSULE | Refills: 0 | Status: SHIPPED | OUTPATIENT
Start: 2025-05-20

## 2025-05-20 RX ORDER — ROSUVASTATIN CALCIUM 40 MG/1
40 TABLET, COATED ORAL DAILY
Qty: 90 TABLET | Refills: 0 | Status: SHIPPED | OUTPATIENT
Start: 2025-05-20

## 2025-05-20 RX ORDER — LOSARTAN POTASSIUM 50 MG/1
50 TABLET ORAL 2 TIMES DAILY
Qty: 180 TABLET | Refills: 0 | Status: SHIPPED | OUTPATIENT
Start: 2025-05-20

## 2025-05-20 RX ORDER — BUPROPION HYDROCHLORIDE 150 MG/1
150 TABLET ORAL EVERY MORNING
Qty: 90 TABLET | Refills: 0 | Status: SHIPPED | OUTPATIENT
Start: 2025-05-20

## 2025-05-20 RX ORDER — LEVOTHYROXINE SODIUM 125 UG/1
125 TABLET ORAL DAILY
Qty: 90 TABLET | Refills: 0 | Status: SHIPPED | OUTPATIENT
Start: 2025-05-20

## 2025-05-20 RX ORDER — METOPROLOL SUCCINATE 25 MG/1
25 TABLET, EXTENDED RELEASE ORAL DAILY
Qty: 90 TABLET | Refills: 0 | Status: SHIPPED | OUTPATIENT
Start: 2025-05-20

## 2025-05-20 RX ORDER — CLOPIDOGREL BISULFATE 75 MG/1
75 TABLET ORAL DAILY
Qty: 90 TABLET | Refills: 0 | Status: SHIPPED | OUTPATIENT
Start: 2025-05-20

## 2025-05-21 RX ORDER — ROSUVASTATIN CALCIUM 40 MG/1
40 TABLET, COATED ORAL DAILY
Qty: 90 TABLET | Refills: 0 | OUTPATIENT
Start: 2025-05-21

## 2025-05-21 RX ORDER — OMEGA-3-ACID ETHYL ESTERS 1 G/1
2 CAPSULE, LIQUID FILLED ORAL 2 TIMES DAILY
Qty: 360 CAPSULE | Refills: 0 | OUTPATIENT
Start: 2025-05-21

## 2025-05-21 RX ORDER — LOSARTAN POTASSIUM 50 MG/1
50 TABLET ORAL 2 TIMES DAILY
Qty: 180 TABLET | Refills: 0 | OUTPATIENT
Start: 2025-05-21

## 2025-05-21 RX ORDER — LEVOTHYROXINE SODIUM 125 UG/1
125 TABLET ORAL DAILY
Qty: 90 TABLET | Refills: 0 | OUTPATIENT
Start: 2025-05-21

## 2025-05-21 RX ORDER — CLOPIDOGREL BISULFATE 75 MG/1
75 TABLET ORAL DAILY
Qty: 90 TABLET | Refills: 0 | OUTPATIENT
Start: 2025-05-21

## 2025-05-21 RX ORDER — BUPROPION HYDROCHLORIDE 150 MG/1
150 TABLET ORAL EVERY MORNING
Qty: 90 TABLET | Refills: 0 | OUTPATIENT
Start: 2025-05-21

## 2025-05-21 RX ORDER — METOPROLOL SUCCINATE 25 MG/1
25 TABLET, EXTENDED RELEASE ORAL DAILY
Qty: 90 TABLET | Refills: 0 | OUTPATIENT
Start: 2025-05-21

## 2025-05-27 ENCOUNTER — TELEPHONE (OUTPATIENT)
Age: 60
End: 2025-05-27

## 2025-05-27 ENCOUNTER — OFFICE VISIT (OUTPATIENT)
Age: 60
End: 2025-05-27
Payer: COMMERCIAL

## 2025-05-27 VITALS
HEIGHT: 68 IN | DIASTOLIC BLOOD PRESSURE: 60 MMHG | SYSTOLIC BLOOD PRESSURE: 124 MMHG | OXYGEN SATURATION: 96 % | TEMPERATURE: 97.4 F | BODY MASS INDEX: 29.37 KG/M2 | HEART RATE: 64 BPM | WEIGHT: 193.8 LBS | RESPIRATION RATE: 18 BRPM

## 2025-05-27 DIAGNOSIS — E66.9 OBESITY: Primary | ICD-10-CM

## 2025-05-27 DIAGNOSIS — G47.33 OSA ON CPAP: Chronic | ICD-10-CM

## 2025-05-27 DIAGNOSIS — Z00.00 ADULT GENERAL MEDICAL EXAMINATION: Primary | ICD-10-CM

## 2025-05-27 DIAGNOSIS — Z12.5 SCREENING FOR PROSTATE CANCER: ICD-10-CM

## 2025-05-27 PROCEDURE — 99396 PREV VISIT EST AGE 40-64: CPT | Performed by: INTERNAL MEDICINE

## 2025-05-27 RX ORDER — TIRZEPATIDE 2.5 MG/.5ML
2.5 INJECTION, SOLUTION SUBCUTANEOUS WEEKLY
Qty: 2 ML | Refills: 0 | Status: SHIPPED | OUTPATIENT
Start: 2025-05-27 | End: 2025-05-27

## 2025-05-27 RX ORDER — TIRZEPATIDE 2.5 MG/.5ML
2.5 INJECTION, SOLUTION SUBCUTANEOUS WEEKLY
Qty: 2 ML | Refills: 0 | Status: SHIPPED | OUTPATIENT
Start: 2025-05-27 | End: 2025-05-28

## 2025-05-27 NOTE — PATIENT INSTRUCTIONS
"Patient Education     Routine physical for adults   The Basics   Written by the doctors and editors at [facility]   What is a physical? -- A physical is a routine visit, or \"check-up,\" with your doctor. You might also hear it called a \"wellness visit\" or \"preventive visit.\"  During each visit, the doctor will:   Ask about your physical and mental health   Ask about your habits, behaviors, and lifestyle   Do an exam   Give you vaccines if needed   Talk to you about any medicines you take   Give advice about your health   Answer your questions  Getting regular check-ups is an important part of taking care of your health. It can help your doctor find and treat any problems you have. But it's also important for preventing health problems.  A routine physical is different from a \"sick visit.\" A sick visit is when you see a doctor because of a health concern or problem. Since physicals are scheduled ahead of time, you can think about what you want to ask the doctor.  How often should I get a physical? -- It depends on your age and health. In general, for people age 21 years and older:   If you are younger than 50 years, you might be able to get a physical every 3 years.   If you are 50 years or older, your doctor might recommend a physical every year.  If you have an ongoing health condition, like diabetes or high blood pressure, your doctor will probably want to see you more often.  What happens during a physical? -- In general, each visit will include:   Physical exam - The doctor or nurse will check your height, weight, heart rate, and blood pressure. They will also look at your eyes and ears. They will ask about how you are feeling and whether you have any symptoms that bother you.   Medicines - It's a good idea to bring a list of all the medicines you take to each doctor visit. Your doctor will talk to you about your medicines and answer any questions. Tell them if you are having any side effects that bother you. You " "should also tell them if you are having trouble paying for any of your medicines.   Habits and behaviors - This includes:   Your diet   Your exercise habits   Whether you smoke, drink alcohol, or use drugs   Whether you are sexually active   Whether you feel safe at home  Your doctor will talk to you about things you can do to improve your health and lower your risk of health problems. They will also offer help and support. For example, if you want to quit smoking, they can give you advice and might prescribe medicines. If you want to improve your diet or get more physical activity, they can help you with this, too.   Lab tests, if needed - The tests you get will depend on your age and situation. For example, your doctor might want to check your:   Cholesterol   Blood sugar   Iron level   Vaccines - The recommended vaccines will depend on your age, health, and what vaccines you already had. Vaccines are very important because they can prevent certain serious or deadly infections.   Discussion of screening - \"Screening\" means checking for diseases or other health problems before they cause symptoms. Your doctor can recommend screening based on your age, risk, and preferences. This might include tests to check for:   Cancer, such as breast, prostate, cervical, ovarian, colorectal, prostate, lung, or skin cancer   Sexually transmitted infections, such as chlamydia and gonorrhea   Mental health conditions like depression and anxiety  Your doctor will talk to you about the different types of screening tests. They can help you decide which screenings to have. They can also explain what the results might mean.   Answering questions - The physical is a good time to ask the doctor or nurse questions about your health. If needed, they can refer you to other doctors or specialists, too.  Adults older than 65 years often need other care, too. As you get older, your doctor will talk to you about:   How to prevent falling at " home   Hearing or vision tests   Memory testing   How to take your medicines safely   Making sure that you have the help and support you need at home  All topics are updated as new evidence becomes available and our peer review process is complete.  This topic retrieved from [employer] on: May 02, 2024.  Topic 726351 Version 1.0  Release: 32.4.3 - C32.122  © 2024 UpToDate, Inc. and/or its affiliates. All rights reserved.  Consumer Information Use and Disclaimer   Disclaimer: This generalized information is a limited summary of diagnosis, treatment, and/or medication information. It is not meant to be comprehensive and should be used as a tool to help the user understand and/or assess potential diagnostic and treatment options. It does NOT include all information about conditions, treatments, medications, side effects, or risks that may apply to a specific patient. It is not intended to be medical advice or a substitute for the medical advice, diagnosis, or treatment of a health care provider based on the health care provider's examination and assessment of a patient's specific and unique circumstances. Patients must speak with a health care provider for complete information about their health, medical questions, and treatment options, including any risks or benefits regarding use of medications. This information does not endorse any treatments or medications as safe, effective, or approved for treating a specific patient. UpToDate, Inc. and its affiliates disclaim any warranty or liability relating to this information or the use thereof.The use of this information is governed by the Terms of Use, available at https://www.woltersClimateminderuwer.com/en/know/clinical-effectiveness-terms. 2024© UpToDate, Inc. and its affiliates and/or licensors. All rights reserved.  Copyright   © 2024 Up[employer]Date, Inc. and/or its affiliates. All rights reserved.

## 2025-05-27 NOTE — ASSESSMENT & PLAN NOTE
Has been diagnosed with DAVID over 10 years ago and continues use of CPAP. His BMI is elevated over 27 and he has additional risk factors including: DAVID, hepatic steatosis, CAD, hyperlipidemia, depression. His BMI is 29.47. he has been engaging in diet and lifestyle changes for over 6 months and has difficulty losing additional weight. Would recommend Zepbound. Discussed dosing and common side effects. Zepbound will not be used with any other GLP agonist therapy. He is not on any other weight loss medication.    Orders:  •  tirzepatide (Zepbound) 2.5 mg/0.5 mL auto-injector; Inject 0.5 mL (2.5 mg total) under the skin once a week for 28 days

## 2025-05-27 NOTE — TELEPHONE ENCOUNTER
PA for (Zepbound) 2.5 mg/0.5 mL auto-injector SUBMITTED to     via    [x][facility]-KEY: [id number]  []Surescripts-Case ID #   []Availity-Auth ID # NDC #   []Faxed to plan   []Other website   []Phone call Case ID #     [x]PA sent as URGENT    All office notes, labs and other pertaining documents and studies sent. Clinical questions answered. Awaiting determination from insurance company.     Turnaround time for your insurance to make a decision on your Prior Authorization can take 7-21 business days.

## 2025-05-27 NOTE — PROGRESS NOTES
Adult Annual Physical  Name: Anjali Perez      : 1965      MRN: [id number]  Encounter Provider: Brock De La Cruz DO  Encounter Date: 2025   Encounter department: [facility] [place]    :  Assessment & Plan  Adult general medical examination      DAVID on CPAP    Has been diagnosed with DAVID over 10 years ago and continues use of CPAP. His BMI is elevated over 27 and he has additional risk factors including: DAVID, hepatic steatosis, CAD, hyperlipidemia, depression. His BMI is 29.47. he has been engaging in diet and lifestyle changes for over 6 months and has difficulty losing additional weight. Would recommend Zepbound. Discussed dosing and common side effects. Zepbound will not be used with any other GLP agonist therapy. He is not on any other weight loss medication.    Orders:  •  tirzepatide (Zepbound) 2.5 mg/0.5 mL auto-injector; Inject 0.5 mL (2.5 mg total) under the skin once a week for 28 days    Screening for prostate cancer    Orders:  •  PSA, Total Screen; Future      Preventive Screenings:  - Diabetes Screening: screening up-to-date  - Cholesterol Screening: screening not indicated and has hyperlipidemia   - Hepatitis C screening: screening up-to-date   - Colon cancer screening: screening up-to-date   - Lung cancer screening: screening not indicated   - Prostate cancer screening: orders placed     Immunizations:  - Immunizations due: Prevnar 20 and Zoster (Shingrix)    Counseling/Anticipatory Guidance:  - Alcohol: discussed moderation in alcohol intake and recommendations for healthy alcohol use.   - Drug use: discussed harms of illicit drug use and how it can negatively impact mental/physical health.   - Tobacco use: discussed harms of tobacco use and management options for quitting.   - Dental health: discussed importance of regular tooth brushing, flossing, and dental visits.   - Sexual health: discussed sexually transmitted diseases, partner selection, use of condoms,  avoidance of unintended pregnancy, and contraceptive alternatives.   - Diet: discussed recommendations for a healthy/well-balanced diet.   - Exercise: the importance of regular exercise/physical activity was discussed. Recommend exercise 3-5 times per week for at least 30 minutes.   - Injury prevention: discussed safety/seat belts, safety helmets, smoke detectors, carbon monoxide detectors, and smoking near bedding or upholstery.       Depression Screening and Follow-up Plan: Patient was screened for depression during today's encounter. They screened negative with a PHQ-9 score of 0.        History of Present Illness     Adult Annual Physical:  Patient presents for annual physical. Feels generally well but would like to discuss going on one of the weight loss drugs.     Diet and Physical Activity:  - Diet/Nutrition: well balanced diet.  - Exercise: walking.    Depression Screening:    - PHQ-9 Score: 0     Health:  - History of STDs: no.   - Urinary symptoms: none.     Review of Systems   Constitutional:  Negative for appetite change, chills, fatigue and fever.   HENT:  Negative for congestion, hearing loss, postnasal drip, rhinorrhea, sore throat, tinnitus and trouble swallowing.    Eyes:  Negative for pain, discharge, redness and visual disturbance.   Respiratory:  Negative for cough, chest tightness, shortness of breath and wheezing.    Cardiovascular:  Negative for chest pain, palpitations and leg swelling.   Gastrointestinal:  Negative for abdominal distention, abdominal pain, blood in stool, constipation, diarrhea, nausea and vomiting.   Endocrine: Negative for cold intolerance, heat intolerance, polydipsia, polyphagia and polyuria.   Genitourinary:  Negative for difficulty urinating, dysuria, frequency, hematuria and urgency.   Musculoskeletal:  Negative for arthralgias, back pain, gait problem, joint swelling, myalgias, neck pain and neck stiffness.   Skin:  Negative for color change and rash.  "  Neurological:  Negative for dizziness, tremors, seizures, syncope, speech difficulty, weakness, light-headedness, numbness and headaches.   Hematological:  Negative for adenopathy. Does not bruise/bleed easily.   Psychiatric/Behavioral:  Negative for agitation, behavioral problems, confusion, hallucinations, sleep disturbance and suicidal ideas. The patient is not nervous/anxious.      Medical History Reviewed by provider this encounter:  Tobacco  Allergies  Meds  Problems  Med Hx  Surg Hx  Fam Hx         Objective   /60   Pulse 64   Temp (!) 97.4 °F (36.3 °C) (Tympanic)   Resp 18   Ht 5' 8\" (1.727 m)   Wt 87.9 kg (193 lb 12.8 oz)   SpO2 96%   BMI 29.47 kg/m²     Physical Exam  Constitutional:       General: He is not in acute distress.     Appearance: He is not ill-appearing.   HENT:      Right Ear: Tympanic membrane, ear canal and external ear normal. There is no impacted cerumen.      Left Ear: Tympanic membrane, ear canal and external ear normal. There is no impacted cerumen.      Mouth/Throat:      Mouth: Mucous membranes are moist.      Pharynx: No oropharyngeal exudate or posterior oropharyngeal erythema.     Cardiovascular:      Rate and Rhythm: Normal rate and regular rhythm.      Heart sounds: No murmur heard.  Pulmonary:      Effort: Pulmonary effort is normal. No respiratory distress.      Breath sounds: No wheezing.   Abdominal:      General: Bowel sounds are normal. There is no distension.      Tenderness: There is no abdominal tenderness.     Musculoskeletal:      Right lower leg: No edema.      Left lower leg: No edema.     Neurological:      General: No focal deficit present.      Mental Status: He is alert. Mental status is at baseline.     Psychiatric:         Mood and Affect: Mood normal.         Behavior: Behavior normal.       Brock De La Cruz,    "

## 2025-05-28 RX ORDER — TIRZEPATIDE 2.5 MG/.5ML
2.5 INJECTION, SOLUTION SUBCUTANEOUS WEEKLY
Qty: 2 ML | Refills: 0 | Status: SHIPPED | OUTPATIENT
Start: 2025-05-28

## 2025-05-28 RX ORDER — TIRZEPATIDE 2.5 MG/.5ML
2.5 INJECTION, SOLUTION SUBCUTANEOUS WEEKLY
Qty: 2 ML | Refills: 0 | OUTPATIENT
Start: 2025-05-28 | End: 2025-06-25

## 2025-05-29 NOTE — TELEPHONE ENCOUNTER
PA for (Zepbound) 2.5 mg/0.5 mL auto-injector  DENIED    Reason:(Screenshot if applicable)        Message sent to office clinical pool Yes    Denial letter scanned into Media Yes    We can gladly do an appeal but the process can take about 30-60 days to provide determination. Please have the office staff schedule a Peer to Peer at phone [phone] . If an appeal is truly warranted please have Provider send clinical documentation to the PA department to support the appeal.     **Please follow up with your patient regarding denial and next steps**

## 2025-06-03 ENCOUNTER — APPOINTMENT (OUTPATIENT)
Age: 60
End: 2025-06-03
Payer: COMMERCIAL

## 2025-06-03 DIAGNOSIS — I10 ESSENTIAL HYPERTENSION, MALIGNANT: ICD-10-CM

## 2025-06-03 DIAGNOSIS — E78.00 PURE HYPERCHOLESTEROLEMIA: ICD-10-CM

## 2025-06-03 DIAGNOSIS — R73.01 IMPAIRED FASTING GLUCOSE: ICD-10-CM

## 2025-06-03 DIAGNOSIS — E03.9 MYXEDEMA HEART DISEASE: ICD-10-CM

## 2025-06-03 DIAGNOSIS — I51.9 MYXEDEMA HEART DISEASE: ICD-10-CM

## 2025-06-03 DIAGNOSIS — E03.9 ACQUIRED HYPOTHYROIDISM: Chronic | ICD-10-CM

## 2025-06-03 DIAGNOSIS — Z12.5 SCREENING FOR PROSTATE CANCER: ICD-10-CM

## 2025-06-03 DIAGNOSIS — E78.49 OTHER HYPERLIPIDEMIA: Chronic | ICD-10-CM

## 2025-06-03 LAB
ALBUMIN SERPL BCG-MCNC: 4.5 G/DL (ref 3.5–5)
ALP SERPL-CCNC: 51 U/L (ref 34–104)
ALT SERPL W P-5'-P-CCNC: 34 U/L (ref 7–52)
ANION GAP SERPL CALCULATED.3IONS-SCNC: 6 MMOL/L (ref 4–13)
AST SERPL W P-5'-P-CCNC: 25 U/L (ref 13–39)
BILIRUB DIRECT SERPL-MCNC: 0.06 MG/DL (ref 0–0.2)
BILIRUB SERPL-MCNC: 0.33 MG/DL (ref 0.2–1)
BUN SERPL-MCNC: 19 MG/DL (ref 5–25)
CALCIUM SERPL-MCNC: 9.9 MG/DL (ref 8.4–10.2)
CHLORIDE SERPL-SCNC: 102 MMOL/L (ref 96–108)
CHOLEST SERPL-MCNC: 112 MG/DL (ref ?–200)
CK SERPL-CCNC: 155 U/L (ref 39–308)
CO2 SERPL-SCNC: 29 MMOL/L (ref 21–32)
CREAT SERPL-MCNC: 1.17 MG/DL (ref 0.6–1.3)
EST. AVERAGE GLUCOSE BLD GHB EST-MCNC: 114 MG/DL
GFR SERPL CREATININE-BSD FRML MDRD: 66 ML/MIN/1.73SQ M
GLUCOSE P FAST SERPL-MCNC: 91 MG/DL (ref 65–99)
HBA1C MFR BLD: 5.6 %
HDLC SERPL-MCNC: 27 MG/DL
LDLC SERPL CALC-MCNC: 28 MG/DL (ref 0–100)
LDLC SERPL DIRECT ASSAY-MCNC: 55 MG/DL (ref 0–100)
POTASSIUM SERPL-SCNC: 4.1 MMOL/L (ref 3.5–5.3)
PROT SERPL-MCNC: 7.5 G/DL (ref 6.4–8.4)
PSA SERPL-MCNC: 0.5 NG/ML (ref 0–4)
SODIUM SERPL-SCNC: 137 MMOL/L (ref 135–147)
TRIGL SERPL-MCNC: 283 MG/DL (ref ?–150)
TSH SERPL DL<=0.05 MIU/L-ACNC: 4.41 UIU/ML (ref 0.45–4.5)
URATE SERPL-MCNC: 6.5 MG/DL (ref 3.5–8.5)

## 2025-06-03 PROCEDURE — 80053 COMPREHEN METABOLIC PANEL: CPT

## 2025-06-03 PROCEDURE — 82550 ASSAY OF CK (CPK): CPT

## 2025-06-03 PROCEDURE — 83721 ASSAY OF BLOOD LIPOPROTEIN: CPT

## 2025-06-03 PROCEDURE — G0103 PSA SCREENING: HCPCS

## 2025-06-03 PROCEDURE — 84443 ASSAY THYROID STIM HORMONE: CPT

## 2025-06-03 PROCEDURE — 80061 LIPID PANEL: CPT

## 2025-06-03 PROCEDURE — 83036 HEMOGLOBIN GLYCOSYLATED A1C: CPT

## 2025-06-03 PROCEDURE — 84550 ASSAY OF BLOOD/URIC ACID: CPT

## 2025-06-03 PROCEDURE — 82248 BILIRUBIN DIRECT: CPT

## 2025-06-03 PROCEDURE — 36415 COLL VENOUS BLD VENIPUNCTURE: CPT

## 2025-06-04 ENCOUNTER — RESULTS FOLLOW-UP (OUTPATIENT)
Age: 60
End: 2025-06-04

## 2025-06-16 DIAGNOSIS — G47.33 OSA ON CPAP: Chronic | ICD-10-CM

## 2025-06-16 RX ORDER — TIRZEPATIDE 5 MG/.5ML
5 INJECTION, SOLUTION SUBCUTANEOUS WEEKLY
Qty: 2 ML | Refills: 3 | Status: SHIPPED | OUTPATIENT
Start: 2025-06-16

## 2025-06-19 ENCOUNTER — TELEPHONE (OUTPATIENT)
Age: 60
End: 2025-06-19

## 2025-06-19 NOTE — TELEPHONE ENCOUNTER
Appeal isn't able to be done. Based off what insurance sent us, he won't qualify even with an appeal. I agree with many of the reasons he listed in his letter but we can't override the insurance criteria that they have set. Their rules are over the top and very strict.

## 2025-06-19 NOTE — TELEPHONE ENCOUNTER
Pts RX got denied - left letter in bin explaining what needs to be sent for appeal    Any probs - call pt

## 2025-06-25 ENCOUNTER — ESTABLISHED COMPREHENSIVE EXAM (OUTPATIENT)
Dept: URBAN - METROPOLITAN AREA CLINIC 76 | Facility: CLINIC | Age: 60
End: 2025-06-25

## 2025-06-25 DIAGNOSIS — H25.093: ICD-10-CM

## 2025-06-25 PROCEDURE — 92250 FUNDUS PHOTOGRAPHY W/I&R: CPT | Mod: NC

## 2025-06-25 PROCEDURE — 92014 COMPRE OPH EXAM EST PT 1/>: CPT

## 2025-06-25 ASSESSMENT — KERATOMETRY
OS_AXISANGLE_DEGREES: 168
OS_AXISANGLE2_DEGREES: 76
OD_AXISANGLE_DEGREES: 021
OD_K2POWER_DIOPTERS: 43.75
OS_AXISANGLE2_DEGREES: 78
OS_K1POWER_DIOPTERS: 43.50
OS_K2POWER_DIOPTERS: 43.75
OD_K1POWER_DIOPTERS: 43.50
OD_AXISANGLE2_DEGREES: 111
OD_AXISANGLE_DEGREES: 033
OS_AXISANGLE_DEGREES: 166
OD_AXISANGLE2_DEGREES: 123

## 2025-06-25 ASSESSMENT — VISUAL ACUITY
OU_CC: 20/25-3
OS_SC: 20/20
OU_SC: 20/20
OD_SC: 20/20

## 2025-06-25 ASSESSMENT — TONOMETRY
OD_IOP_MMHG: 11
OS_IOP_MMHG: 13

## 2025-07-16 ENCOUNTER — OFFICE VISIT (OUTPATIENT)
Age: 60
End: 2025-07-16
Payer: COMMERCIAL

## 2025-07-16 VITALS — WEIGHT: 193 LBS | BODY MASS INDEX: 29.35 KG/M2

## 2025-07-16 DIAGNOSIS — L81.4 LENTIGO: ICD-10-CM

## 2025-07-16 DIAGNOSIS — D22.72 MULTIPLE BENIGN MELANOCYTIC NEVI OF BOTH UPPER EXTREMITIES, BOTH LOWER EXTREMITIES, AND TRUNK: ICD-10-CM

## 2025-07-16 DIAGNOSIS — D22.5 MULTIPLE BENIGN MELANOCYTIC NEVI OF BOTH UPPER EXTREMITIES, BOTH LOWER EXTREMITIES, AND TRUNK: ICD-10-CM

## 2025-07-16 DIAGNOSIS — D22.71 MULTIPLE BENIGN MELANOCYTIC NEVI OF BOTH UPPER EXTREMITIES, BOTH LOWER EXTREMITIES, AND TRUNK: ICD-10-CM

## 2025-07-16 DIAGNOSIS — D22.61 MULTIPLE BENIGN MELANOCYTIC NEVI OF BOTH UPPER EXTREMITIES, BOTH LOWER EXTREMITIES, AND TRUNK: ICD-10-CM

## 2025-07-16 DIAGNOSIS — D22.62 MULTIPLE BENIGN MELANOCYTIC NEVI OF BOTH UPPER EXTREMITIES, BOTH LOWER EXTREMITIES, AND TRUNK: ICD-10-CM

## 2025-07-16 DIAGNOSIS — D18.01 CHERRY ANGIOMA: Primary | ICD-10-CM

## 2025-07-16 DIAGNOSIS — L73.8 SEBACEOUS HYPERPLASIA: ICD-10-CM

## 2025-07-16 PROCEDURE — 99213 OFFICE O/P EST LOW 20 MIN: CPT | Performed by: REGISTERED NURSE

## 2025-07-16 NOTE — PROGRESS NOTES
"[facility] Note     Patient Name: Anjali Perez  Encounter Date: 7/16/25       Have you been cared for by a [facility] Dermatologist in the last 3 years and, if so, which description applies to you? Yes. I have been here within the last 3 years, and my medical history has NOT changed since that time. I am not of child-bearing potential.     REVIEW OF SYSTEMS:  Have you recently had or currently have any of the following? No changes in my recent health.   PAST MEDICAL HISTORY:  Have you personally ever had or currently have any of the following?  If \"YES,\" then please provide more detail. No changes in my medical history.   HISTORY OF IMMUNOSUPPRESSION: Do you have a history of any of the following:  Systemic Immunosuppression such as Diabetes, Biologic or Immunotherapy, Chemotherapy, Organ Transplantation, Bone Marrow Transplantation or Prednisone?  No     Answering \"YES\" requires the addition of the dotphrase \"IMMUNOSUPPRESSED\" as the first diagnosis of the patient's visit.   FAMILY HISTORY:  Any \"first degree relatives\" (parent, brother, sister, or child) with the following?    No changes in my family's known health.   PATIENT EXPERIENCE:    Do you want the Dermatologist to perform a COMPLETE skin exam today including a clinical examination under the \"bra and underwear\" areas?  Yes  If necessary, do we have your permission to call and leave a detailed message on your Preferred Phone number that includes your specific medical information?  Yes      Allergies[1] Current Medications[2]              Whom besides the patient is providing clinical information about today's encounter?   NO ADDITIONAL HISTORIAN (patient alone provided history)    Physical Exam and Assessment/Plan by Diagnosis:    SEBACEOUS HYPERPLASIA    Physical Exam:  Anatomic Location Affected:  face  Morphological Description:  umbilicated pink papule   Pertinent Positives:  Pertinent Negatives:    Additional History of Present " Condition:  present on exam    Assessment and Plan:  Based on a thorough discussion of this condition and the management approach to it (including a comprehensive discussion of the known risks, side effects and potential benefits of treatment), the patient (family) agrees to implement the following specific plan:  Reassured benign    Sebaceous Hyperplasia  Sebaceous hyperplasia is a common, benign condition of enlarged oil secreting (sebaceous) glands commonly found on the forehead and cheeks of middle-aged and elderly patients. They normally appear as small yellow bumps up to 3mm in diameter that can be single or multiple. The bumps on the face often display a centrall dell. Occasionally, these bumps can occur on the chest, areola, mouth, and genitals. Rarely, they can grow to take a giant form, or be arranged linearly.     Causes of sebaceous hyperplasia  Sebaceous hyperplasic is a form of benign hair follicle tumor and can often be confused with basal cell carcinoma. It can be more prevalent in immunosuppressed patients such as those undergoing organ transplantation. In the rare Fabio-Corrales syndrome, sebaceous hyperplasia occurs in association with internal cancers.  Lesions of sebaceous hyperplasia are benign, with no known potential for malignant transformation, but they may be associated with nonmelanoma skin cancer in transplantation patients.     How we do diagnose sebaceous hyperplasia?  Your dermatologist may take a closer look at the bumps with a device called a dermatoscope. Common features include a central hair follicle surrounded by yellowish lobules with prominent blood vessels.     What is the treatment of sebaceous hyperplasia?   Since sebaceous hyperplasia is benign with no known potential for transformation into cancer, treatment is mostly for cosmetic reasons or if the lesions become irritated. Options include   Light electrocautery or laser vaporization  Oral isotrentinoin is effective for  extensive or disfiguring spots, but do not prevent recurrence   Antiandrogens may be used in females to decrease the size and improve overall appearance of bumps      MELANOCYTIC NEVI  -Relevant exam: Scattered over the trunk/extremities are homogenously pigmented brown macules and papules. ELM performed and without concerning findings.  - Exam and clinical history consistent with melanocytic nevi  - Educated on the ABCDE's of melanoma; handout provided  - Counseled to return to clinic prior to scheduled appointment should any of these lesions change or should any new lesions of concern arise  - Counseled on use of sun protection daily. Reviewed latest FDA sunscreen guidelines, including use of broad spectrum (UVA and UVB blocking) sunscreen or sun protective clothing with SPF 30-50 every 2-3 hours and reapplied after exposure to water; use of photoprotective clothing, including a broad brim hat and UPF rated clothing if outdoors for several hours; avoid use of tanning beds as these pose significant risk for melanoma and skin cancer.    LENTIGINES  OTHER SKIN CHANGES DUE TO CHRONIC EXPOSURE TO NONIONIZING RADIATION  - Relevant exam: Over sun exposed areas are brown macules. ELM performed and without concerning findings.  - Exam and clinical history consistent with lentigines.  - Educated that these are indicative of prior sun exposure.   - Counseled to return to clinic prior to scheduled appointment should any of these lesions change or should any new lesions of concern arise.  - Recommended use of sunscreen as above and below.  - Counseled on use of sun protection daily. Reviewed latest FDA sunscreen guidelines, including use of broad spectrum (UVA and UVB blocking) sunscreen or sun protective clothing with SPF 30-50 every 2-3 hours and reapplied after exposure to water; use of photoprotective clothing, including a broad brim hat and UPF rated clothing if outdoors for several hours; avoid use of tanning beds as  these pose significant risk for melanoma and skin cancer.    CHERRY ANGIOMAS  - Relevant exam: Scattered over the trunk/extremities are red papules  - Exam and clinical history consistent with cherry angiomas  - Educated that these are benign  - Educated that removal is considered aesthetic and would incur a fee.  - Patient does not wish to pursue removal at this time but will contact us should this change.    Scribe Attestation      I,:  Nicole Camara MA am acting as a scribe while in the presence of the attending physician.:       I,:  Aron Valverde MD personally performed the services described in this documentation    as scribed in my presence.:                  [1]   Allergies  Allergen Reactions    Pollen Extract Other (See Comments)   [2]   Current Outpatient Medications:     buPROPion (WELLBUTRIN XL) 150 mg 24 hr tablet, TAKE 1 TABLET BY MOUTH EVERY MORNING, Disp: 90 tablet, Rfl: 0    clopidogrel (PLAVIX) 75 mg tablet, TAKE 1 TABLET BY MOUTH EVERY DAY, Disp: 90 tablet, Rfl: 0    levothyroxine 125 mcg tablet, TAKE 1 TABLET BY MOUTH EVERY DAY, Disp: 90 tablet, Rfl: 0    losartan (COZAAR) 50 mg tablet, TAKE 1 TABLET BY MOUTH TWICE A DAY, Disp: 180 tablet, Rfl: 0    metoprolol succinate (TOPROL-XL) 25 mg 24 hr tablet, TAKE 1 TABLET (25 MG TOTAL) BY MOUTH DAILY., Disp: 90 tablet, Rfl: 0    omega-3-acid ethyl esters (LOVAZA) 1 g capsule, TAKE 2 CAPSULES BY MOUTH 2 TIMES A DAY., Disp: 360 capsule, Rfl: 0    rosuvastatin (CRESTOR) 40 MG tablet, TAKE 1 TABLET BY MOUTH EVERY DAY, Disp: 90 tablet, Rfl: 0    Tirzepatide-Weight Management (Zepbound) 5 mg/0.5 mL subcutaneous solution (vial), Inject 0.5 mL (5 mg total) under the skin once a week, Disp: 2 mL, Rfl: 3

## 2025-07-29 ENCOUNTER — APPOINTMENT (OUTPATIENT)
Age: 60
End: 2025-07-29
Payer: COMMERCIAL

## 2025-08-18 DIAGNOSIS — E03.9 ACQUIRED HYPOTHYROIDISM: ICD-10-CM

## 2025-08-20 RX ORDER — LEVOTHYROXINE SODIUM 125 UG/1
125 TABLET ORAL DAILY
Qty: 90 TABLET | Refills: 1 | Status: SHIPPED | OUTPATIENT
Start: 2025-08-20

## 2025-08-22 DIAGNOSIS — E78.49 OTHER HYPERLIPIDEMIA: ICD-10-CM

## 2025-08-22 DIAGNOSIS — F33.9 DEPRESSION, RECURRENT (HCC): ICD-10-CM

## 2025-08-22 DIAGNOSIS — I25.10 CORONARY ARTERY DISEASE INVOLVING NATIVE CORONARY ARTERY OF NATIVE HEART WITHOUT ANGINA PECTORIS: ICD-10-CM

## 2025-08-22 RX ORDER — LOSARTAN POTASSIUM 50 MG/1
50 TABLET ORAL 2 TIMES DAILY
Qty: 180 TABLET | Refills: 1 | Status: SHIPPED | OUTPATIENT
Start: 2025-08-22

## 2025-08-22 RX ORDER — BUPROPION HYDROCHLORIDE 150 MG/1
150 TABLET ORAL EVERY MORNING
Qty: 90 TABLET | Refills: 1 | Status: SHIPPED | OUTPATIENT
Start: 2025-08-22

## 2025-08-22 RX ORDER — CLOPIDOGREL BISULFATE 75 MG/1
75 TABLET ORAL DAILY
Qty: 90 TABLET | Refills: 1 | Status: SHIPPED | OUTPATIENT
Start: 2025-08-22

## 2025-08-22 RX ORDER — METOPROLOL SUCCINATE 25 MG/1
25 TABLET, EXTENDED RELEASE ORAL DAILY
Qty: 90 TABLET | Refills: 1 | Status: SHIPPED | OUTPATIENT
Start: 2025-08-22

## 2025-08-22 RX ORDER — ROSUVASTATIN CALCIUM 40 MG/1
40 TABLET, COATED ORAL DAILY
Qty: 90 TABLET | Refills: 1 | Status: SHIPPED | OUTPATIENT
Start: 2025-08-22

## 2025-08-22 RX ORDER — OMEGA-3-ACID ETHYL ESTERS 1 G/1
2 CAPSULE, LIQUID FILLED ORAL 2 TIMES DAILY
Qty: 360 CAPSULE | Refills: 1 | Status: SHIPPED | OUTPATIENT
Start: 2025-08-22